# Patient Record
Sex: MALE | Race: WHITE | NOT HISPANIC OR LATINO | Employment: OTHER | URBAN - METROPOLITAN AREA
[De-identification: names, ages, dates, MRNs, and addresses within clinical notes are randomized per-mention and may not be internally consistent; named-entity substitution may affect disease eponyms.]

---

## 2017-03-23 ENCOUNTER — CONVERSION ENCOUNTER (OUTPATIENT)
Dept: CARDIOLOGY | Facility: CLINIC | Age: 82
End: 2017-03-23

## 2017-03-24 ENCOUNTER — CONVERSION ENCOUNTER (OUTPATIENT)
Dept: CARDIOLOGY | Facility: CLINIC | Age: 82
End: 2017-03-24

## 2017-03-27 ENCOUNTER — HOSPITAL ENCOUNTER (OUTPATIENT)
Dept: CARDIOLOGY | Facility: HOSPITAL | Age: 82
Discharge: HOME | End: 2017-03-27
Attending: INTERNAL MEDICINE | Admitting: INTERNAL MEDICINE

## 2017-06-15 ENCOUNTER — HOSPITAL ENCOUNTER (OUTPATIENT)
Dept: ONCOLOGY | Facility: CLINIC | Age: 82
Discharge: HOME | End: 2017-06-15
Attending: INTERNAL MEDICINE | Admitting: INTERNAL MEDICINE

## 2017-06-15 LAB
ALBUMIN SERPL-MCNC: 3.9 G/DL (ref 3.5–4.8)
ALBUMIN/GLOB SERPL: 1.3 {RATIO} (ref 1–1.7)
ALP SERPL-CCNC: 75 IU/L (ref 32–91)
ALT SERPL-CCNC: 18 IU/L (ref 17–63)
ANION GAP SERPL CALC-SCNC: 14 MMOL/L (ref 10–20)
AST SERPL-CCNC: 20 IU/L (ref 15–41)
BILIRUB SERPL-MCNC: 0.7 MG/DL (ref 0.3–1.2)
BUN SERPL-MCNC: 13 MG/DL (ref 8–20)
BUN/CREAT SERPL: 14.4 (ref 6.2–20.3)
CALCIUM SERPL-MCNC: 9.3 MG/DL (ref 8.9–10.3)
CHLORIDE SERPL-SCNC: 102 MMOL/L (ref 101–111)
CONV CO2: 28 MMOL/L (ref 22–32)
CONV TOTAL PROTEIN: 6.9 G/DL (ref 6.1–7.9)
CREAT UR-MCNC: 0.9 MG/DL (ref 0.7–1.2)
GLOBULIN UR ELPH-MCNC: 3 G/DL (ref 2.5–3.8)
GLUCOSE SERPL-MCNC: 164 MG/DL (ref 65–99)
POTASSIUM SERPL-SCNC: 4 MMOL/L (ref 3.6–5.1)
SODIUM SERPL-SCNC: 140 MMOL/L (ref 136–144)

## 2018-04-11 ENCOUNTER — CONVERSION ENCOUNTER (OUTPATIENT)
Dept: CARDIOLOGY | Facility: CLINIC | Age: 83
End: 2018-04-11

## 2018-06-14 ENCOUNTER — HOSPITAL ENCOUNTER (OUTPATIENT)
Dept: ONCOLOGY | Facility: HOSPITAL | Age: 83
Discharge: HOME | End: 2018-06-14
Attending: INTERNAL MEDICINE | Admitting: INTERNAL MEDICINE

## 2018-06-14 ENCOUNTER — CLINICAL SUPPORT (OUTPATIENT)
Dept: ONCOLOGY | Facility: HOSPITAL | Age: 83
End: 2018-06-14

## 2018-06-14 ENCOUNTER — HOSPITAL ENCOUNTER (OUTPATIENT)
Dept: ONCOLOGY | Facility: CLINIC | Age: 83
Setting detail: INFUSION SERIES
Discharge: HOME | End: 2018-06-14
Attending: INTERNAL MEDICINE | Admitting: INTERNAL MEDICINE

## 2018-06-14 LAB
ALBUMIN SERPL-MCNC: 3.6 G/DL (ref 3.5–4.8)
ALBUMIN/GLOB SERPL: 0.9 {RATIO} (ref 1–1.7)
ALP SERPL-CCNC: 66 IU/L (ref 32–91)
ALT SERPL-CCNC: 16 IU/L (ref 17–63)
ANION GAP SERPL CALC-SCNC: 12.2 MMOL/L (ref 10–20)
AST SERPL-CCNC: 18 IU/L (ref 15–41)
BILIRUB SERPL-MCNC: 0.5 MG/DL (ref 0.3–1.2)
BUN SERPL-MCNC: 13 MG/DL (ref 8–20)
BUN/CREAT SERPL: 14.4 (ref 6.2–20.3)
CALCIUM SERPL-MCNC: 9.6 MG/DL (ref 8.9–10.3)
CHLORIDE SERPL-SCNC: 102 MMOL/L (ref 101–111)
CONV CO2: 27 MMOL/L (ref 22–32)
CONV TOTAL PROTEIN: 7.4 G/DL (ref 6.1–7.9)
CREAT UR-MCNC: 0.9 MG/DL (ref 0.7–1.2)
GLOBULIN UR ELPH-MCNC: 3.8 G/DL (ref 2.5–3.8)
GLUCOSE SERPL-MCNC: 105 MG/DL (ref 65–99)
POTASSIUM SERPL-SCNC: 4.2 MMOL/L (ref 3.6–5.1)
SODIUM SERPL-SCNC: 137 MMOL/L (ref 136–144)

## 2018-06-14 NOTE — PROGRESS NOTES
PATIENTS ONCOLOGY RECORD LOCATED IN [facility] [[email or web address]]      Subjective     Name:  MORGAN MIRANDA     Date:  2018  Address:   [place] [address]  Home: [phone]  :  1930 AGE:  88 y.o.        RECORDS OBTAINED:  Patients Oncology Record is located in [facility]

## 2018-06-15 LAB
ANA SER QL IA: NORMAL

## 2019-06-04 VITALS
OXYGEN SATURATION: 97 % | BODY MASS INDEX: 28.1 KG/M2 | WEIGHT: 202 LBS | OXYGEN SATURATION: 95 % | DIASTOLIC BLOOD PRESSURE: 78 MMHG | SYSTOLIC BLOOD PRESSURE: 146 MMHG | DIASTOLIC BLOOD PRESSURE: 72 MMHG | SYSTOLIC BLOOD PRESSURE: 136 MMHG | WEIGHT: 201.5 LBS | HEART RATE: 61 BPM | DIASTOLIC BLOOD PRESSURE: 78 MMHG | BODY MASS INDEX: 28.28 KG/M2 | WEIGHT: 204.75 LBS | HEIGHT: 71 IN | SYSTOLIC BLOOD PRESSURE: 146 MMHG | BODY MASS INDEX: 28.56 KG/M2 | HEART RATE: 64 BPM

## 2019-06-13 ENCOUNTER — HOSPITAL ENCOUNTER (OUTPATIENT)
Dept: ONCOLOGY | Facility: CLINIC | Age: 84
Setting detail: INFUSION SERIES
Discharge: HOME | End: 2019-06-13
Attending: INTERNAL MEDICINE | Admitting: INTERNAL MEDICINE

## 2019-06-13 ENCOUNTER — HOSPITAL ENCOUNTER (OUTPATIENT)
Dept: ONCOLOGY | Facility: HOSPITAL | Age: 84
Discharge: HOME | End: 2019-06-13
Attending: INTERNAL MEDICINE | Admitting: INTERNAL MEDICINE

## 2019-06-13 LAB
ALBUMIN SERPL-MCNC: 3.7 G/DL (ref 3.5–4.8)
ALBUMIN/GLOB SERPL: 1.2 {RATIO} (ref 1–1.7)
ALP SERPL-CCNC: 67 IU/L (ref 32–91)
ALT SERPL-CCNC: 17 IU/L (ref 17–63)
ANION GAP SERPL CALC-SCNC: 15 MMOL/L (ref 10–20)
AST SERPL-CCNC: 23 IU/L (ref 15–41)
BILIRUB SERPL-MCNC: 0.6 MG/DL (ref 0.3–1.2)
BUN SERPL-MCNC: 15 MG/DL (ref 8–20)
BUN/CREAT SERPL: 13.6 (ref 6.2–20.3)
CALCIUM SERPL-MCNC: 9.2 MG/DL (ref 8.9–10.3)
CHLORIDE SERPL-SCNC: 99 MMOL/L (ref 101–111)
CONV CO2: 27 MMOL/L (ref 22–32)
CONV TOTAL PROTEIN: 6.9 G/DL (ref 6.1–7.9)
CREAT UR-MCNC: 1.1 MG/DL (ref 0.7–1.2)
GLOBULIN UR ELPH-MCNC: 3.2 G/DL (ref 2.5–3.8)
GLUCOSE SERPL-MCNC: 138 MG/DL (ref 65–99)
POTASSIUM SERPL-SCNC: 4 MMOL/L (ref 3.6–5.1)
SODIUM SERPL-SCNC: 137 MMOL/L (ref 136–144)

## 2019-06-19 DIAGNOSIS — R76.0 POSITIVE H. PYLORI TITER: Primary | ICD-10-CM

## 2019-06-19 RX ORDER — AMOXICILLIN 500 MG/1
1000 CAPSULE ORAL 2 TIMES DAILY
Qty: 56 CAPSULE | Refills: 0 | Status: SHIPPED | OUTPATIENT
Start: 2019-06-19 | End: 2019-07-03

## 2019-06-19 RX ORDER — OMEPRAZOLE 20 MG/1
20 CAPSULE, DELAYED RELEASE ORAL 2 TIMES DAILY
Qty: 28 CAPSULE | Refills: 0 | Status: SHIPPED | OUTPATIENT
Start: 2019-06-19 | End: 2019-07-03

## 2019-06-19 RX ORDER — CLARITHROMYCIN 500 MG/1
500 TABLET, COATED ORAL 2 TIMES DAILY
Qty: 28 TABLET | Refills: 0 | Status: SHIPPED | OUTPATIENT
Start: 2019-06-19 | End: 2019-07-03

## 2019-06-20 ENCOUNTER — TELEPHONE (OUTPATIENT)
Dept: ONCOLOGY | Facility: CLINIC | Age: 84
End: 2019-06-20

## 2019-06-20 ENCOUNTER — DOCUMENTATION (OUTPATIENT)
Dept: ONCOLOGY | Facility: CLINIC | Age: 84
End: 2019-06-20

## 2019-06-20 NOTE — TELEPHONE ENCOUNTER
Let pt know his test results for H. Pylori was positive and that the NP sent in 3 new medications.  Pt showed v/u.

## 2019-06-29 DIAGNOSIS — R76.0 POSITIVE H. PYLORI TITER: ICD-10-CM

## 2019-07-01 RX ORDER — OMEPRAZOLE 20 MG/1
CAPSULE, DELAYED RELEASE ORAL
Qty: 28 CAPSULE | Refills: 0 | OUTPATIENT
Start: 2019-07-01

## 2024-11-16 ENCOUNTER — APPOINTMENT (OUTPATIENT)
Dept: GENERAL RADIOLOGY | Facility: HOSPITAL | Age: 89
End: 2024-11-16
Payer: MEDICARE

## 2024-11-16 ENCOUNTER — HOSPITAL ENCOUNTER (INPATIENT)
Facility: HOSPITAL | Age: 89
LOS: 6 days | Discharge: NURSING, REHAB OR LONG-TERM CARE | End: 2024-11-22
Attending: EMERGENCY MEDICINE | Admitting: FAMILY MEDICINE
Payer: MEDICARE

## 2024-11-16 DIAGNOSIS — I48.91 ATRIAL FIBRILLATION, UNSPECIFIED TYPE: ICD-10-CM

## 2024-11-16 DIAGNOSIS — I50.9 ACUTE CONGESTIVE HEART FAILURE, UNSPECIFIED HEART FAILURE TYPE: Primary | ICD-10-CM

## 2024-11-16 DIAGNOSIS — R79.89 ELEVATED TROPONIN: ICD-10-CM

## 2024-11-16 LAB
ALBUMIN SERPL-MCNC: 3.3 G/DL (ref 3.5–5.2)
ALBUMIN/GLOB SERPL: 0.9 G/DL
ALP SERPL-CCNC: 116 U/L (ref 39–117)
ALT SERPL W P-5'-P-CCNC: 21 U/L (ref 1–41)
ANION GAP SERPL CALCULATED.3IONS-SCNC: 10.5 MMOL/L (ref 5–15)
ANION GAP SERPL CALCULATED.3IONS-SCNC: 6.6 MMOL/L (ref 5–15)
AST SERPL-CCNC: 18 U/L (ref 1–40)
B PARAPERT DNA SPEC QL NAA+PROBE: NOT DETECTED
B PERT DNA SPEC QL NAA+PROBE: NOT DETECTED
BACTERIA UR QL AUTO: ABNORMAL /HPF
BASOPHILS # BLD AUTO: 0.02 10*3/MM3 (ref 0–0.2)
BASOPHILS # BLD AUTO: 0.03 10*3/MM3 (ref 0–0.2)
BASOPHILS NFR BLD AUTO: 0.2 % (ref 0–1.5)
BASOPHILS NFR BLD AUTO: 0.3 % (ref 0–1.5)
BILIRUB SERPL-MCNC: 0.2 MG/DL (ref 0–1.2)
BILIRUB UR QL STRIP: NEGATIVE
BUN SERPL-MCNC: 27 MG/DL (ref 8–23)
BUN SERPL-MCNC: 29 MG/DL (ref 8–23)
BUN/CREAT SERPL: 30.9 (ref 7–25)
BUN/CREAT SERPL: 33.3 (ref 7–25)
C PNEUM DNA NPH QL NAA+NON-PROBE: NOT DETECTED
CALCIUM SPEC-SCNC: 8.9 MG/DL (ref 8.2–9.6)
CALCIUM SPEC-SCNC: 9 MG/DL (ref 8.2–9.6)
CHLORIDE SERPL-SCNC: 101 MMOL/L (ref 98–107)
CHLORIDE SERPL-SCNC: 102 MMOL/L (ref 98–107)
CLARITY UR: CLEAR
CO2 SERPL-SCNC: 25.5 MMOL/L (ref 22–29)
CO2 SERPL-SCNC: 28.4 MMOL/L (ref 22–29)
COLOR UR: YELLOW
CREAT SERPL-MCNC: 0.81 MG/DL (ref 0.76–1.27)
CREAT SERPL-MCNC: 0.94 MG/DL (ref 0.76–1.27)
D DIMER PPP FEU-MCNC: 2.86 MCGFEU/ML (ref 0–0.94)
DEPRECATED RDW RBC AUTO: 53 FL (ref 37–54)
DEPRECATED RDW RBC AUTO: 54.9 FL (ref 37–54)
EGFRCR SERPLBLD CKD-EPI 2021: 75.1 ML/MIN/1.73
EGFRCR SERPLBLD CKD-EPI 2021: 81.7 ML/MIN/1.73
EOSINOPHIL # BLD AUTO: 0.08 10*3/MM3 (ref 0–0.4)
EOSINOPHIL # BLD AUTO: 0.09 10*3/MM3 (ref 0–0.4)
EOSINOPHIL NFR BLD AUTO: 0.7 % (ref 0.3–6.2)
EOSINOPHIL NFR BLD AUTO: 0.9 % (ref 0.3–6.2)
ERYTHROCYTE [DISTWIDTH] IN BLOOD BY AUTOMATED COUNT: 14.6 % (ref 12.3–15.4)
ERYTHROCYTE [DISTWIDTH] IN BLOOD BY AUTOMATED COUNT: 14.6 % (ref 12.3–15.4)
FLUAV SUBTYP SPEC NAA+PROBE: NOT DETECTED
FLUAV SUBTYP SPEC NAA+PROBE: NOT DETECTED
FLUBV RNA ISLT QL NAA+PROBE: NOT DETECTED
FLUBV RNA ISLT QL NAA+PROBE: NOT DETECTED
GEN 5 2HR TROPONIN T REFLEX: 54 NG/L
GLOBULIN UR ELPH-MCNC: 3.7 GM/DL
GLUCOSE SERPL-MCNC: 108 MG/DL (ref 65–99)
GLUCOSE SERPL-MCNC: 127 MG/DL (ref 65–99)
GLUCOSE UR STRIP-MCNC: NEGATIVE MG/DL
HADV DNA SPEC NAA+PROBE: NOT DETECTED
HCOV 229E RNA SPEC QL NAA+PROBE: NOT DETECTED
HCOV HKU1 RNA SPEC QL NAA+PROBE: NOT DETECTED
HCOV NL63 RNA SPEC QL NAA+PROBE: NOT DETECTED
HCOV OC43 RNA SPEC QL NAA+PROBE: NOT DETECTED
HCT VFR BLD AUTO: 37.8 % (ref 37.5–51)
HCT VFR BLD AUTO: 37.8 % (ref 37.5–51)
HGB BLD-MCNC: 11.6 G/DL (ref 13–17.7)
HGB BLD-MCNC: 11.8 G/DL (ref 13–17.7)
HGB UR QL STRIP.AUTO: ABNORMAL
HMPV RNA NPH QL NAA+NON-PROBE: NOT DETECTED
HPIV1 RNA ISLT QL NAA+PROBE: NOT DETECTED
HPIV2 RNA SPEC QL NAA+PROBE: NOT DETECTED
HPIV3 RNA NPH QL NAA+PROBE: NOT DETECTED
HPIV4 P GENE NPH QL NAA+PROBE: DETECTED
HYALINE CASTS UR QL AUTO: ABNORMAL /LPF
IMM GRANULOCYTES # BLD AUTO: 0.03 10*3/MM3 (ref 0–0.05)
IMM GRANULOCYTES # BLD AUTO: 0.03 10*3/MM3 (ref 0–0.05)
IMM GRANULOCYTES NFR BLD AUTO: 0.3 % (ref 0–0.5)
IMM GRANULOCYTES NFR BLD AUTO: 0.3 % (ref 0–0.5)
IRON 24H UR-MRATE: 23 MCG/DL (ref 59–158)
IRON SATN MFR SERPL: 10 % (ref 20–50)
KETONES UR QL STRIP: NEGATIVE
LEUKOCYTE ESTERASE UR QL STRIP.AUTO: ABNORMAL
LYMPHOCYTES # BLD AUTO: 1.92 10*3/MM3 (ref 0.7–3.1)
LYMPHOCYTES # BLD AUTO: 2.03 10*3/MM3 (ref 0.7–3.1)
LYMPHOCYTES NFR BLD AUTO: 17.8 % (ref 19.6–45.3)
LYMPHOCYTES NFR BLD AUTO: 19.4 % (ref 19.6–45.3)
M PNEUMO IGG SER IA-ACNC: NOT DETECTED
MAGNESIUM SERPL-MCNC: 1.9 MG/DL (ref 1.7–2.3)
MCH RBC QN AUTO: 30.4 PG (ref 26.6–33)
MCH RBC QN AUTO: 31.4 PG (ref 26.6–33)
MCHC RBC AUTO-ENTMCNC: 30.7 G/DL (ref 31.5–35.7)
MCHC RBC AUTO-ENTMCNC: 31.2 G/DL (ref 31.5–35.7)
MCV RBC AUTO: 100.5 FL (ref 79–97)
MCV RBC AUTO: 99 FL (ref 79–97)
MONOCYTES # BLD AUTO: 1.46 10*3/MM3 (ref 0.1–0.9)
MONOCYTES # BLD AUTO: 1.63 10*3/MM3 (ref 0.1–0.9)
MONOCYTES NFR BLD AUTO: 14 % (ref 5–12)
MONOCYTES NFR BLD AUTO: 15.1 % (ref 5–12)
MRSA DNA SPEC QL NAA+PROBE: ABNORMAL
NEUTROPHILS NFR BLD AUTO: 6.81 10*3/MM3 (ref 1.7–7)
NEUTROPHILS NFR BLD AUTO: 65.2 % (ref 42.7–76)
NEUTROPHILS NFR BLD AUTO: 65.8 % (ref 42.7–76)
NEUTROPHILS NFR BLD AUTO: 7.11 10*3/MM3 (ref 1.7–7)
NITRITE UR QL STRIP: POSITIVE
NRBC BLD AUTO-RTO: 0 /100 WBC (ref 0–0.2)
NT-PROBNP SERPL-MCNC: 2608 PG/ML (ref 0–1800)
PH UR STRIP.AUTO: 5.5 [PH] (ref 5–8)
PLATELET # BLD AUTO: 177 10*3/MM3 (ref 140–450)
PLATELET # BLD AUTO: 195 10*3/MM3 (ref 140–450)
PMV BLD AUTO: 10.2 FL (ref 6–12)
PMV BLD AUTO: 9.5 FL (ref 6–12)
POTASSIUM SERPL-SCNC: 4.4 MMOL/L (ref 3.5–5.2)
POTASSIUM SERPL-SCNC: 4.4 MMOL/L (ref 3.5–5.2)
PROT SERPL-MCNC: 7 G/DL (ref 6–8.5)
PROT UR QL STRIP: ABNORMAL
QT INTERVAL: 347 MS
QT INTERVAL: 359 MS
QT INTERVAL: 380 MS
QTC INTERVAL: 442 MS
QTC INTERVAL: 455 MS
QTC INTERVAL: 456 MS
RBC # BLD AUTO: 3.76 10*6/MM3 (ref 4.14–5.8)
RBC # BLD AUTO: 3.82 10*6/MM3 (ref 4.14–5.8)
RBC # UR STRIP: ABNORMAL /HPF
REF LAB TEST METHOD: ABNORMAL
RHINOVIRUS RNA SPEC NAA+PROBE: NOT DETECTED
RSV RNA NPH QL NAA+NON-PROBE: NOT DETECTED
SARS-COV-2 RNA NPH QL NAA+NON-PROBE: NOT DETECTED
SARS-COV-2 RNA RESP QL NAA+PROBE: NOT DETECTED
SODIUM SERPL-SCNC: 136 MMOL/L (ref 136–145)
SODIUM SERPL-SCNC: 138 MMOL/L (ref 136–145)
SP GR UR STRIP: 1.02 (ref 1–1.03)
SQUAMOUS #/AREA URNS HPF: ABNORMAL /HPF
TIBC SERPL-MCNC: 240 MCG/DL (ref 298–536)
TRANSFERRIN SERPL-MCNC: 161 MG/DL (ref 200–360)
TROPONIN T DELTA: -2 NG/L
TROPONIN T SERPL HS-MCNC: 56 NG/L
TSH SERPL DL<=0.05 MIU/L-ACNC: 0.99 UIU/ML (ref 0.27–4.2)
UROBILINOGEN UR QL STRIP: ABNORMAL
WBC # UR STRIP: ABNORMAL /HPF
WBC NRBC COR # BLD AUTO: 10.44 10*3/MM3 (ref 3.4–10.8)
WBC NRBC COR # BLD AUTO: 10.8 10*3/MM3 (ref 3.4–10.8)

## 2024-11-16 PROCEDURE — 93010 ELECTROCARDIOGRAM REPORT: CPT | Performed by: EMERGENCY MEDICINE

## 2024-11-16 PROCEDURE — 83735 ASSAY OF MAGNESIUM: CPT | Performed by: FAMILY MEDICINE

## 2024-11-16 PROCEDURE — 87636 SARSCOV2 & INF A&B AMP PRB: CPT | Performed by: EMERGENCY MEDICINE

## 2024-11-16 PROCEDURE — 71045 X-RAY EXAM CHEST 1 VIEW: CPT

## 2024-11-16 PROCEDURE — 99285 EMERGENCY DEPT VISIT HI MDM: CPT

## 2024-11-16 PROCEDURE — 0202U NFCT DS 22 TRGT SARS-COV-2: CPT | Performed by: FAMILY MEDICINE

## 2024-11-16 PROCEDURE — 36415 COLL VENOUS BLD VENIPUNCTURE: CPT

## 2024-11-16 PROCEDURE — 84443 ASSAY THYROID STIM HORMONE: CPT | Performed by: FAMILY MEDICINE

## 2024-11-16 PROCEDURE — 25010000002 ENOXAPARIN PER 10 MG: Performed by: FAMILY MEDICINE

## 2024-11-16 PROCEDURE — 93010 ELECTROCARDIOGRAM REPORT: CPT | Performed by: INTERNAL MEDICINE

## 2024-11-16 PROCEDURE — 85025 COMPLETE CBC W/AUTO DIFF WBC: CPT | Performed by: EMERGENCY MEDICINE

## 2024-11-16 PROCEDURE — 84484 ASSAY OF TROPONIN QUANT: CPT | Performed by: EMERGENCY MEDICINE

## 2024-11-16 PROCEDURE — 83540 ASSAY OF IRON: CPT | Performed by: FAMILY MEDICINE

## 2024-11-16 PROCEDURE — 25010000002 FUROSEMIDE PER 20 MG: Performed by: FAMILY MEDICINE

## 2024-11-16 PROCEDURE — 85025 COMPLETE CBC W/AUTO DIFF WBC: CPT | Performed by: FAMILY MEDICINE

## 2024-11-16 PROCEDURE — 93005 ELECTROCARDIOGRAM TRACING: CPT | Performed by: EMERGENCY MEDICINE

## 2024-11-16 PROCEDURE — 84466 ASSAY OF TRANSFERRIN: CPT | Performed by: FAMILY MEDICINE

## 2024-11-16 PROCEDURE — 87641 MR-STAPH DNA AMP PROBE: CPT | Performed by: FAMILY MEDICINE

## 2024-11-16 PROCEDURE — 85379 FIBRIN DEGRADATION QUANT: CPT | Performed by: INTERNAL MEDICINE

## 2024-11-16 PROCEDURE — 81001 URINALYSIS AUTO W/SCOPE: CPT | Performed by: FAMILY MEDICINE

## 2024-11-16 PROCEDURE — 80053 COMPREHEN METABOLIC PANEL: CPT | Performed by: EMERGENCY MEDICINE

## 2024-11-16 PROCEDURE — 83880 ASSAY OF NATRIURETIC PEPTIDE: CPT | Performed by: EMERGENCY MEDICINE

## 2024-11-16 PROCEDURE — 93005 ELECTROCARDIOGRAM TRACING: CPT | Performed by: FAMILY MEDICINE

## 2024-11-16 PROCEDURE — 99285 EMERGENCY DEPT VISIT HI MDM: CPT | Performed by: EMERGENCY MEDICINE

## 2024-11-16 RX ORDER — PANTOPRAZOLE SODIUM 40 MG/10ML
40 INJECTION, POWDER, LYOPHILIZED, FOR SOLUTION INTRAVENOUS
Status: DISCONTINUED | OUTPATIENT
Start: 2024-11-17 | End: 2024-11-17

## 2024-11-16 RX ORDER — BISACODYL 5 MG/1
5 TABLET, DELAYED RELEASE ORAL DAILY PRN
Status: DISCONTINUED | OUTPATIENT
Start: 2024-11-16 | End: 2024-11-22 | Stop reason: HOSPADM

## 2024-11-16 RX ORDER — BISACODYL 10 MG
10 SUPPOSITORY, RECTAL RECTAL DAILY PRN
Status: DISCONTINUED | OUTPATIENT
Start: 2024-11-16 | End: 2024-11-22 | Stop reason: HOSPADM

## 2024-11-16 RX ORDER — FUROSEMIDE 10 MG/ML
20 INJECTION INTRAMUSCULAR; INTRAVENOUS ONCE
Status: COMPLETED | OUTPATIENT
Start: 2024-11-16 | End: 2024-11-16

## 2024-11-16 RX ORDER — AMLODIPINE BESYLATE 5 MG/1
5 TABLET ORAL DAILY
COMMUNITY

## 2024-11-16 RX ORDER — SODIUM CHLORIDE 0.9 % (FLUSH) 0.9 %
10 SYRINGE (ML) INJECTION AS NEEDED
Status: DISCONTINUED | OUTPATIENT
Start: 2024-11-16 | End: 2024-11-22 | Stop reason: HOSPADM

## 2024-11-16 RX ORDER — ACETAMINOPHEN 325 MG/1
650 TABLET ORAL EVERY 4 HOURS PRN
COMMUNITY

## 2024-11-16 RX ORDER — CLONIDINE HYDROCHLORIDE 0.1 MG/1
0.1 TABLET ORAL 2 TIMES DAILY
COMMUNITY

## 2024-11-16 RX ORDER — LANOLIN ALCOHOL/MO/W.PET/CERES
25 CREAM (GRAM) TOPICAL DAILY
COMMUNITY

## 2024-11-16 RX ORDER — AMOXICILLIN 250 MG
2 CAPSULE ORAL 2 TIMES DAILY PRN
Status: DISCONTINUED | OUTPATIENT
Start: 2024-11-16 | End: 2024-11-22 | Stop reason: HOSPADM

## 2024-11-16 RX ORDER — ACETAMINOPHEN 325 MG/1
650 TABLET ORAL EVERY 4 HOURS PRN
Status: DISCONTINUED | OUTPATIENT
Start: 2024-11-16 | End: 2024-11-22 | Stop reason: HOSPADM

## 2024-11-16 RX ORDER — ACETAMINOPHEN 650 MG/1
650 SUPPOSITORY RECTAL EVERY 4 HOURS PRN
Status: DISCONTINUED | OUTPATIENT
Start: 2024-11-16 | End: 2024-11-22 | Stop reason: HOSPADM

## 2024-11-16 RX ORDER — ONDANSETRON 2 MG/ML
4 INJECTION INTRAMUSCULAR; INTRAVENOUS EVERY 6 HOURS PRN
Status: DISCONTINUED | OUTPATIENT
Start: 2024-11-16 | End: 2024-11-22 | Stop reason: HOSPADM

## 2024-11-16 RX ORDER — ENOXAPARIN SODIUM 100 MG/ML
40 INJECTION SUBCUTANEOUS
Status: DISCONTINUED | OUTPATIENT
Start: 2024-11-16 | End: 2024-11-22 | Stop reason: HOSPADM

## 2024-11-16 RX ORDER — ONDANSETRON 4 MG/1
4 TABLET, ORALLY DISINTEGRATING ORAL EVERY 6 HOURS PRN
Status: DISCONTINUED | OUTPATIENT
Start: 2024-11-16 | End: 2024-11-22 | Stop reason: HOSPADM

## 2024-11-16 RX ORDER — POLYETHYLENE GLYCOL 3350 17 G/17G
17 POWDER, FOR SOLUTION ORAL DAILY PRN
Status: DISCONTINUED | OUTPATIENT
Start: 2024-11-16 | End: 2024-11-22 | Stop reason: HOSPADM

## 2024-11-16 RX ORDER — SODIUM CHLORIDE 0.9 % (FLUSH) 0.9 %
10 SYRINGE (ML) INJECTION EVERY 12 HOURS SCHEDULED
Status: DISCONTINUED | OUTPATIENT
Start: 2024-11-16 | End: 2024-11-22 | Stop reason: HOSPADM

## 2024-11-16 RX ORDER — HYDRALAZINE HYDROCHLORIDE 20 MG/ML
10 INJECTION INTRAMUSCULAR; INTRAVENOUS EVERY 6 HOURS PRN
Status: DISCONTINUED | OUTPATIENT
Start: 2024-11-16 | End: 2024-11-22 | Stop reason: HOSPADM

## 2024-11-16 RX ORDER — SODIUM CHLORIDE 9 MG/ML
40 INJECTION, SOLUTION INTRAVENOUS AS NEEDED
Status: DISCONTINUED | OUTPATIENT
Start: 2024-11-16 | End: 2024-11-22 | Stop reason: HOSPADM

## 2024-11-16 RX ORDER — LISINOPRIL AND HYDROCHLOROTHIAZIDE 12.5; 2 MG/1; MG/1
1 TABLET ORAL DAILY
COMMUNITY
End: 2024-11-22 | Stop reason: HOSPADM

## 2024-11-16 RX ORDER — PRIMIDONE 250 MG/1
250 TABLET ORAL 3 TIMES DAILY
COMMUNITY

## 2024-11-16 RX ORDER — ACETAMINOPHEN 160 MG/5ML
650 SOLUTION ORAL EVERY 4 HOURS PRN
Status: DISCONTINUED | OUTPATIENT
Start: 2024-11-16 | End: 2024-11-22 | Stop reason: HOSPADM

## 2024-11-16 RX ORDER — METOPROLOL SUCCINATE 25 MG/1
12.5 TABLET, EXTENDED RELEASE ORAL
Status: DISCONTINUED | OUTPATIENT
Start: 2024-11-16 | End: 2024-11-22 | Stop reason: HOSPADM

## 2024-11-16 RX ADMIN — Medication 10 ML: at 22:01

## 2024-11-16 RX ADMIN — FUROSEMIDE 20 MG: 10 INJECTION, SOLUTION INTRAMUSCULAR; INTRAVENOUS at 19:22

## 2024-11-16 RX ADMIN — METOPROLOL SUCCINATE 12.5 MG: 25 TABLET, EXTENDED RELEASE ORAL at 19:23

## 2024-11-16 RX ADMIN — ENOXAPARIN SODIUM 40 MG: 100 INJECTION SUBCUTANEOUS at 19:22

## 2024-11-16 NOTE — FSED PROVIDER NOTE
Subjective   History of Present Illness  89-year-old male sent from urgent care due to concern for new onset CHF.  He has some cough swelling of the legs.  He denies any fevers or chills denies body aches just says he has mild shortness of breath and the cough that is wet sounding.  Does admit to some increased swelling the legs, denies any history of coronary artery disease denies congestive heart failure.  Never lays down in bed because he is generally too weak to get out of bed so he is in a recliner that can help stand him up.  Review of Systems  As noted in HPI  History reviewed. No pertinent past medical history.    No Known Allergies    History reviewed. No pertinent surgical history.    History reviewed. No pertinent family history.    Social History     Socioeconomic History    Marital status:    Tobacco Use    Smoking status: Former     Types: Cigarettes   Vaping Use    Vaping status: Never Used   Substance and Sexual Activity    Alcohol use: Not Currently    Drug use: Never    Sexual activity: Defer           Objective   Physical Exam  Nursing note reviewed.  INITIAL VITAL SIGNS: Reviewed by me.  Pulse ox normal  GENERAL: Alert and interactive. No acute distress.  HEAD: Head is normocephalic.  EYES: EOMI. PERRL. No scleral icterus. No conjunctival injection.  ENT: Moist mucous membranes.   NECK: Supple. Full range of motion.  RESPIRATORY: No tachypnea.  Bilateral inspiratory crackles  CV: Irregularly irregular rhythm  ABDOMEN: Soft, nondistended, nontender. No guarding. No rebound. No masses.   BACK:  No obvious deformity.  EXTREMITIES: No deformity. No clubbing or cyanosis.  2+ pitting edema two thirds the way up the shin  SKIN: Warm and dry. No diaphoresis. No obvious rashes.   NEUROLOGIC: Alert and oriented. Face is symmetric. Speech is normal. Moves all extremities equally. Motor and sensory distally intact.       Procedures     EKG         EKG time/Interp time: 1159/1201  Rhythm/Rate:  First-degree AV block with premature atrial complexes  P waves and MD:   QRS, axis: Normal QRS duration with a slight left axis deviation  ST and T waves: No ST elevations or depressions concerning for acute ischemia  Independently interpreted by me contemporaneously with treatment        ED Course  ED Course as of 11/16/24 1353   Sat Nov 16, 2024   1332 Patient with H&P as above, presentation consistent with new onset CHF versus bilateral pneumonia.  On exam he does have inspiratory crackles, heart sounds are irregularly irregular, has bilateral lower extremity edema.  EKG is without acute ischemia, read as sinus rhythm with PACs but I have suspicion for atrial fibrillation due to variability of heart rate on the monitor.  Labs and x-ray confirmed new onset CHF.  BNP is 2600 high-sensitivity troponin is 56.  X-ray is with edema.  Discussed with nurse practitioner for [employer], stepping patient to Dr. Alejandro's service [RO]      ED Course User Index  [RO] Sathya Weinstein MD                                           Medical Decision Making  Problems Addressed:  Acute congestive heart failure, unspecified heart failure type: complicated acute illness or injury  Elevated troponin: complicated acute illness or injury    Amount and/or Complexity of Data Reviewed  Labs: ordered. Decision-making details documented in ED Course.  Radiology: ordered. Decision-making details documented in ED Course.  ECG/medicine tests: ordered and independent interpretation performed. Decision-making details documented in ED Course.    Risk  Prescription drug management.  Decision regarding hospitalization.        Final diagnoses:   Acute congestive heart failure, unspecified heart failure type   Elevated troponin   Atrial fibrillation, unspecified type       ED Disposition  ED Disposition       ED Disposition   Decision to Admit    Condition   --    Comment   Level of Care: Telemetry [5]   Admitting Physician: OMID ALEJANDRO [[id number]]   Patient  Class: Inpatient [101]                 No follow-up provider specified.       Medication List      No changes were made to your prescriptions during this visit.

## 2024-11-16 NOTE — LETTER
EMS Transport Request  For use at [facility], [place], [place], [place], and [place] only   Patient Name: Sathya Flores : 1930   Weight:84 kg (185 lb 3 oz) Pick-up Location: 2210 BLS/ALS: BLS/ALS: BLS   Insurance: [employer] MEDICARE REPLACEMENT Auth End Date: 24   Pre-Cert #: D/C Summary complete:    Destination: Other [place]    Contact Precautions: None   Equipment (O2, Fluids, etc.): O2, settings 2L NC   Arrive By Date/Time: 24 Stretcher/WC: Wheelchair   CM Requesting: Jena Linton RN/Shaheed Ext: [phone]   Notes/Medical Necessity: Pt is on 2L O2, no home O2, family unable to transport      ______________________________________________________________________    *Only 2 patient bags OR 1 carry-on size bag are permitted.  Wheelchairs and walkers CANNOT transported with the patient. Acknowledge: Yes

## 2024-11-17 ENCOUNTER — APPOINTMENT (OUTPATIENT)
Dept: CT IMAGING | Facility: HOSPITAL | Age: 89
End: 2024-11-17
Payer: MEDICARE

## 2024-11-17 ENCOUNTER — APPOINTMENT (OUTPATIENT)
Dept: CARDIOLOGY | Facility: HOSPITAL | Age: 89
End: 2024-11-17
Payer: MEDICARE

## 2024-11-17 LAB
ANION GAP SERPL CALCULATED.3IONS-SCNC: 11 MMOL/L (ref 5–15)
AORTIC DIMENSIONLESS INDEX: 0.68 (DI)
BACTERIA UR QL AUTO: ABNORMAL /HPF
BASOPHILS # BLD AUTO: 0.03 10*3/MM3 (ref 0–0.2)
BASOPHILS NFR BLD AUTO: 0.2 % (ref 0–1.5)
BH CV ECHO MEAS - AO MAX PG: 10.2 MMHG
BH CV ECHO MEAS - AO MEAN PG: 6 MMHG
BH CV ECHO MEAS - AO V2 MAX: 160 CM/SEC
BH CV ECHO MEAS - AO V2 VTI: 25.1 CM
BH CV ECHO MEAS - AVA(I,D): 2.03 CM2
BH CV ECHO MEAS - EDV(CUBED): 97.3 ML
BH CV ECHO MEAS - EDV(MOD-SP2): 70.5 ML
BH CV ECHO MEAS - EDV(MOD-SP4): 67.9 ML
BH CV ECHO MEAS - EF(MOD-BP): 64.3 %
BH CV ECHO MEAS - EF(MOD-SP2): 62.8 %
BH CV ECHO MEAS - EF(MOD-SP4): 64.8 %
BH CV ECHO MEAS - ESV(CUBED): 39.3 ML
BH CV ECHO MEAS - ESV(MOD-SP2): 26.2 ML
BH CV ECHO MEAS - ESV(MOD-SP4): 23.9 ML
BH CV ECHO MEAS - FS: 26.1 %
BH CV ECHO MEAS - IVS/LVPW: 0.77 CM
BH CV ECHO MEAS - IVSD: 1 CM
BH CV ECHO MEAS - LA DIMENSION: 3.6 CM
BH CV ECHO MEAS - LAT PEAK E' VEL: 7.3 CM/SEC
BH CV ECHO MEAS - LV DIASTOLIC VOL/BSA (35-75): 35.9 CM2
BH CV ECHO MEAS - LV MASS(C)D: 192.9 GRAMS
BH CV ECHO MEAS - LV MAX PG: 4.7 MMHG
BH CV ECHO MEAS - LV MEAN PG: 2 MMHG
BH CV ECHO MEAS - LV SYSTOLIC VOL/BSA (12-30): 12.6 CM2
BH CV ECHO MEAS - LV V1 MAX: 108 CM/SEC
BH CV ECHO MEAS - LV V1 VTI: 16.2 CM
BH CV ECHO MEAS - LVIDD: 4.6 CM
BH CV ECHO MEAS - LVIDS: 3.4 CM
BH CV ECHO MEAS - LVOT AREA: 3.1 CM2
BH CV ECHO MEAS - LVOT DIAM: 2 CM
BH CV ECHO MEAS - LVPWD: 1.3 CM
BH CV ECHO MEAS - MED PEAK E' VEL: 2.9 CM/SEC
BH CV ECHO MEAS - MR MAX PG: 66.6 MMHG
BH CV ECHO MEAS - MR MAX VEL: 408 CM/SEC
BH CV ECHO MEAS - MV A DUR: 0.16 SEC
BH CV ECHO MEAS - MV A MAX VEL: 106 CM/SEC
BH CV ECHO MEAS - MV DEC SLOPE: 861 CM/SEC2
BH CV ECHO MEAS - MV DEC TIME: 0.18 SEC
BH CV ECHO MEAS - MV E MAX VEL: 103 CM/SEC
BH CV ECHO MEAS - MV E/A: 0.97
BH CV ECHO MEAS - MV MAX PG: 9.7 MMHG
BH CV ECHO MEAS - MV MEAN PG: 3 MMHG
BH CV ECHO MEAS - MV P1/2T: 55.1 MSEC
BH CV ECHO MEAS - MV V2 VTI: 32.9 CM
BH CV ECHO MEAS - MVA(P1/2T): 4 CM2
BH CV ECHO MEAS - MVA(VTI): 1.55 CM2
BH CV ECHO MEAS - PA V2 MAX: 116 CM/SEC
BH CV ECHO MEAS - PULM A REVS DUR: 0.16 SEC
BH CV ECHO MEAS - PULM A REVS VEL: 49.7 CM/SEC
BH CV ECHO MEAS - PULM DIAS VEL: 33.9 CM/SEC
BH CV ECHO MEAS - PULM S/D: 1.27
BH CV ECHO MEAS - PULM SYS VEL: 43.1 CM/SEC
BH CV ECHO MEAS - RAP SYSTOLE: 15 MMHG
BH CV ECHO MEAS - RV MAX PG: 3.1 MMHG
BH CV ECHO MEAS - RV V1 MAX: 88.6 CM/SEC
BH CV ECHO MEAS - RV V1 VTI: 16 CM
BH CV ECHO MEAS - RVSP: 57.5 MMHG
BH CV ECHO MEAS - SV(LVOT): 50.9 ML
BH CV ECHO MEAS - SV(MOD-SP2): 44.3 ML
BH CV ECHO MEAS - SV(MOD-SP4): 44 ML
BH CV ECHO MEAS - SVI(LVOT): 26.9 ML/M2
BH CV ECHO MEAS - SVI(MOD-SP2): 23.4 ML/M2
BH CV ECHO MEAS - SVI(MOD-SP4): 23.3 ML/M2
BH CV ECHO MEAS - TAPSE (>1.6): 2.06 CM
BH CV ECHO MEAS - TR MAX PG: 42.5 MMHG
BH CV ECHO MEAS - TR MAX VEL: 326 CM/SEC
BH CV ECHO MEASUREMENTS AVERAGE E/E' RATIO: 20.2
BH CV XLRA - TDI S': 13.6 CM/SEC
BILIRUB UR QL STRIP: NEGATIVE
BUN SERPL-MCNC: 23 MG/DL (ref 8–23)
BUN/CREAT SERPL: 26.4 (ref 7–25)
CALCIUM SPEC-SCNC: 9.4 MG/DL (ref 8.2–9.6)
CHLORIDE SERPL-SCNC: 99 MMOL/L (ref 98–107)
CLARITY UR: ABNORMAL
CO2 SERPL-SCNC: 29 MMOL/L (ref 22–29)
COLOR UR: YELLOW
CREAT SERPL-MCNC: 0.87 MG/DL (ref 0.76–1.27)
DEPRECATED RDW RBC AUTO: 51.8 FL (ref 37–54)
EGFRCR SERPLBLD CKD-EPI 2021: 80 ML/MIN/1.73
EOSINOPHIL # BLD AUTO: 0.02 10*3/MM3 (ref 0–0.4)
EOSINOPHIL NFR BLD AUTO: 0.2 % (ref 0.3–6.2)
ERYTHROCYTE [DISTWIDTH] IN BLOOD BY AUTOMATED COUNT: 14.5 % (ref 12.3–15.4)
GLUCOSE SERPL-MCNC: 100 MG/DL (ref 65–99)
GLUCOSE UR STRIP-MCNC: NEGATIVE MG/DL
HCT VFR BLD AUTO: 40.3 % (ref 37.5–51)
HGB BLD-MCNC: 13.2 G/DL (ref 13–17.7)
HGB UR QL STRIP.AUTO: ABNORMAL
HYALINE CASTS UR QL AUTO: ABNORMAL /LPF
IMM GRANULOCYTES # BLD AUTO: 0.04 10*3/MM3 (ref 0–0.05)
IMM GRANULOCYTES NFR BLD AUTO: 0.3 % (ref 0–0.5)
KETONES UR QL STRIP: ABNORMAL
LEFT ATRIUM VOLUME INDEX: 26.1 ML/M2
LEUKOCYTE ESTERASE UR QL STRIP.AUTO: ABNORMAL
LYMPHOCYTES # BLD AUTO: 1.93 10*3/MM3 (ref 0.7–3.1)
LYMPHOCYTES NFR BLD AUTO: 15.4 % (ref 19.6–45.3)
MCH RBC QN AUTO: 31.9 PG (ref 26.6–33)
MCHC RBC AUTO-ENTMCNC: 32.8 G/DL (ref 31.5–35.7)
MCV RBC AUTO: 97.3 FL (ref 79–97)
MONOCYTES # BLD AUTO: 1.84 10*3/MM3 (ref 0.1–0.9)
MONOCYTES NFR BLD AUTO: 14.7 % (ref 5–12)
NEUTROPHILS NFR BLD AUTO: 69.2 % (ref 42.7–76)
NEUTROPHILS NFR BLD AUTO: 8.64 10*3/MM3 (ref 1.7–7)
NITRITE UR QL STRIP: POSITIVE
NRBC BLD AUTO-RTO: 0 /100 WBC (ref 0–0.2)
PH UR STRIP.AUTO: 7.5 [PH] (ref 5–8)
PLATELET # BLD AUTO: 195 10*3/MM3 (ref 140–450)
PMV BLD AUTO: 10.1 FL (ref 6–12)
POTASSIUM SERPL-SCNC: 4.3 MMOL/L (ref 3.5–5.2)
PROT UR QL STRIP: ABNORMAL
RBC # BLD AUTO: 4.14 10*6/MM3 (ref 4.14–5.8)
RBC # UR STRIP: ABNORMAL /HPF
REF LAB TEST METHOD: ABNORMAL
SINUS: 3.5 CM
SODIUM SERPL-SCNC: 139 MMOL/L (ref 136–145)
SP GR UR STRIP: 1.02 (ref 1–1.03)
SQUAMOUS #/AREA URNS HPF: ABNORMAL /HPF
STJ: 2.3 CM
UROBILINOGEN UR QL STRIP: ABNORMAL
WBC # UR STRIP: ABNORMAL /HPF
WBC NRBC COR # BLD AUTO: 12.5 10*3/MM3 (ref 3.4–10.8)
WHOLE BLOOD HOLD COAG: NORMAL

## 2024-11-17 PROCEDURE — 25010000002 METHYLPREDNISOLONE PER 40 MG: Performed by: INTERNAL MEDICINE

## 2024-11-17 PROCEDURE — 71250 CT THORAX DX C-: CPT

## 2024-11-17 PROCEDURE — 25010000002 ENOXAPARIN PER 10 MG: Performed by: FAMILY MEDICINE

## 2024-11-17 PROCEDURE — 93306 TTE W/DOPPLER COMPLETE: CPT

## 2024-11-17 PROCEDURE — 87077 CULTURE AEROBIC IDENTIFY: CPT | Performed by: NURSE PRACTITIONER

## 2024-11-17 PROCEDURE — 85025 COMPLETE CBC W/AUTO DIFF WBC: CPT | Performed by: FAMILY MEDICINE

## 2024-11-17 PROCEDURE — 87186 SC STD MICRODIL/AGAR DIL: CPT | Performed by: NURSE PRACTITIONER

## 2024-11-17 PROCEDURE — 93306 TTE W/DOPPLER COMPLETE: CPT | Performed by: INTERNAL MEDICINE

## 2024-11-17 PROCEDURE — 97162 PT EVAL MOD COMPLEX 30 MIN: CPT

## 2024-11-17 PROCEDURE — 87086 URINE CULTURE/COLONY COUNT: CPT | Performed by: NURSE PRACTITIONER

## 2024-11-17 PROCEDURE — 25010000002 CEFTRIAXONE PER 250 MG: Performed by: NURSE PRACTITIONER

## 2024-11-17 PROCEDURE — 99222 1ST HOSP IP/OBS MODERATE 55: CPT | Performed by: INTERNAL MEDICINE

## 2024-11-17 PROCEDURE — 80048 BASIC METABOLIC PNL TOTAL CA: CPT | Performed by: FAMILY MEDICINE

## 2024-11-17 PROCEDURE — 81001 URINALYSIS AUTO W/SCOPE: CPT | Performed by: NURSE PRACTITIONER

## 2024-11-17 RX ORDER — PRIMIDONE 250 MG/1
250 TABLET ORAL 3 TIMES DAILY
Status: DISCONTINUED | OUTPATIENT
Start: 2024-11-17 | End: 2024-11-22 | Stop reason: HOSPADM

## 2024-11-17 RX ORDER — CLONIDINE HYDROCHLORIDE 0.1 MG/1
0.1 TABLET ORAL 2 TIMES DAILY
Status: DISCONTINUED | OUTPATIENT
Start: 2024-11-17 | End: 2024-11-22 | Stop reason: HOSPADM

## 2024-11-17 RX ORDER — AMLODIPINE BESYLATE 5 MG/1
5 TABLET ORAL DAILY
Status: DISCONTINUED | OUTPATIENT
Start: 2024-11-17 | End: 2024-11-22 | Stop reason: HOSPADM

## 2024-11-17 RX ORDER — METHYLPREDNISOLONE SODIUM SUCCINATE 40 MG/ML
20 INJECTION, POWDER, LYOPHILIZED, FOR SOLUTION INTRAMUSCULAR; INTRAVENOUS DAILY
Status: DISCONTINUED | OUTPATIENT
Start: 2024-11-17 | End: 2024-11-22

## 2024-11-17 RX ADMIN — AMLODIPINE BESYLATE 5 MG: 5 TABLET ORAL at 12:54

## 2024-11-17 RX ADMIN — PRIMIDONE 250 MG: 250 TABLET ORAL at 17:01

## 2024-11-17 RX ADMIN — CEFTRIAXONE 1000 MG: 1 INJECTION, POWDER, FOR SOLUTION INTRAMUSCULAR; INTRAVENOUS at 12:54

## 2024-11-17 RX ADMIN — ACETAMINOPHEN 650 MG: 650 SOLUTION ORAL at 23:02

## 2024-11-17 RX ADMIN — PRIMIDONE 250 MG: 250 TABLET ORAL at 20:14

## 2024-11-17 RX ADMIN — METOPROLOL SUCCINATE 12.5 MG: 25 TABLET, EXTENDED RELEASE ORAL at 10:37

## 2024-11-17 RX ADMIN — METHYLPREDNISOLONE SODIUM SUCCINATE 20 MG: 40 INJECTION, POWDER, FOR SOLUTION INTRAMUSCULAR; INTRAVENOUS at 12:53

## 2024-11-17 RX ADMIN — PANTOPRAZOLE SODIUM 40 MG: 40 INJECTION, POWDER, FOR SOLUTION INTRAVENOUS at 06:12

## 2024-11-17 RX ADMIN — CLONIDINE HYDROCHLORIDE 0.1 MG: 0.1 TABLET ORAL at 12:54

## 2024-11-17 RX ADMIN — CLONIDINE HYDROCHLORIDE 0.1 MG: 0.1 TABLET ORAL at 20:13

## 2024-11-17 RX ADMIN — ENOXAPARIN SODIUM 40 MG: 100 INJECTION SUBCUTANEOUS at 17:01

## 2024-11-17 RX ADMIN — Medication 10 ML: at 10:37

## 2024-11-17 NOTE — THERAPY EVALUATION
Patient Name: Sathya Flores  : 1930    MRN: [id number]                              Today's Date: 2024       Admit Date: 2024    Visit Dx:     ICD-10-CM ICD-9-CM   1. Acute congestive heart failure, unspecified heart failure type  I50.9 428.0   2. Elevated troponin  R79.89 790.6   3. Atrial fibrillation, unspecified type  I48.91 427.31     Patient Active Problem List   Diagnosis    CHF (congestive heart failure)     History reviewed. No pertinent past medical history.  History reviewed. No pertinent surgical history.   General Information       Row Name 24 1731          Physical Therapy Time and Intention    Document Type evaluation  -EL     Mode of Treatment individual therapy;physical therapy  -EL       Row Name 24 173          General Information    Patient Profile Reviewed yes  -EL     Prior Level of Function independent:;all household mobility;ADL's  -EL       Row Name 24 1731          Living Environment    People in Home alone  -EL       Row Name 24 1731          Home Main Entrance    Number of Stairs, Main Entrance three  -EL       Row Name 24 1731          Stairs Within Home, Primary    Number of Stairs, Within Home, Primary none  -EL       Row Name 24 1731          Cognition    Orientation Status (Cognition) oriented x 4  -EL       Row Name 24 173          Safety Issues/Impairments Affecting Functional Mobility    Impairments Affecting Function (Mobility) balance;endurance/activity tolerance;strength  -EL               User Key  (r) = Recorded By, (t) = Taken By, (c) = Cosigned By      Initials Name Provider Type    EL Travis Delgado PT Physical Therapist                   Mobility       Row Name 24 1735          Bed Mobility    Bed Mobility supine-sit;sit-supine  -EL     Supine-Sit [place] (Bed Mobility) moderate assist (50% patient effort);2 person assist  -EL     Sit-Supine [place] (Bed Mobility) moderate assist (50% patient  effort);2 person assist  -EL       Row Name 11/17/24 1735          Sit-Stand Transfer    Sit-Stand [place] (Transfers) moderate assist (50% patient effort);2 person assist  -EL     Assistive Device (Sit-Stand Transfers) walker, front-wheeled  -EL     Comment, (Sit-Stand Transfer) Reports using lift chair at baseline  -EL               User Key  (r) = Recorded By, (t) = Taken By, (c) = Cosigned By      Initials Name Provider Type    Travis Kennedy PT Physical Therapist                   Obj/Interventions       Row Name 11/17/24 1736          Range of Motion Comprehensive    General Range of Motion bilateral lower extremity ROM WFL  -[facility] Name 11/17/24 1736          Strength Comprehensive (MMT)    General Manual Muscle Testing (MMT) Assessment lower extremity strength deficits identified  -EL     Comment, General Manual Muscle Testing (MMT) Assessment BLE 3+/5 gross  -EL       Row Name 11/17/24 1736          Balance    Balance Assessment standing static balance;sitting static balance  -EL     Static Sitting Balance contact guard  -EL     Static Standing Balance minimal assist  -EL               User Key  (r) = Recorded By, (t) = Taken By, (c) = Cosigned By      Initials Name Provider Type    Travis Kennedy, EMMETT Physical Therapist                   Goals/Plan       [facility] Name 11/17/24 1748          Bed Mobility Goal 1 (PT)    Activity/Assistive Device (Bed Mobility Goal 1, PT) bed mobility activities, all  -EL     Wheeler Level/Cues Needed (Bed Mobility Goal 1, PT) minimum assist (75% or more patient effort)  -EL     Time Frame (Bed Mobility Goal 1, PT) long term goal (LTG);2 weeks  -EL       Row Name 11/17/24 1748          Transfer Goal 1 (PT)    Activity/Assistive Device (Transfer Goal 1, PT) transfers, all;walker, rolling  -EL     Wheeler Level/Cues Needed (Transfer Goal 1, PT) minimum assist (75% or more patient effort)  -EL     Time Frame (Transfer Goal 1, PT) long term goal (LTG);2 weeks  -EL        Row Name 11/17/24 1748          Gait Training Goal 1 (PT)    Activity/Assistive Device (Gait Training Goal 1, PT) gait (walking locomotion);walker, rolling  -EL     [place] Level (Gait Training Goal 1, PT) minimum assist (75% or more patient effort)  -EL     Distance (Gait Training Goal 1, PT) 50  -EL     Time Frame (Gait Training Goal 1, PT) long term goal (LTG);2 weeks  -EL       Row Name 11/17/24 1748          Therapy Assessment/Plan (PT)    Planned Therapy Interventions (PT) neuromuscular re-education;balance training;bed mobility training;transfer training;gait training;patient/family education;strengthening  -EL               User Key  (r) = Recorded By, (t) = Taken By, (c) = Cosigned By      Initials Name Provider Type    Travis Kennedy, PT Physical Therapist                   Clinical Impression       Row Name 11/17/24 1740          Pain    Pretreatment Pain Rating 0/10 - no pain  -EL     Posttreatment Pain Rating 0/10 - no pain  -EL       Row Name 11/17/24 1740          Plan of Care Review    Plan of Care Reviewed With patient  -EL     Outcome Evaluation Pt is a 89 YO M admitted with new onset CHF. Pt reports living at home alone, where he is independent with ADLs, but states he has had difficulty bathing. Typically ambulating with RWx reports having no recent falls, and states he uses a lift chair to come to standing. Pt reports he has been sleeping in lift chair recent as well.. Pt this date demonstrates significant weakness, requiring AX2 for bed mobiltiy and to come to standing. Pt is well below functional baseline and does not appear safe to return home alone, recommendation is SNF at d/c.  -EL       Row Name 11/17/24 1740          Therapy Assessment/Plan (PT)    Rehab Potential (PT) good  -EL     Criteria for Skilled Interventions Met (PT) yes  -EL     Therapy Frequency (PT) 5 times/wk  -EL     Predicted Duration of Therapy Intervention (PT) Until d/c  -EL       Row Name 11/17/24 1740           Vital Signs    O2 Delivery Pre Treatment room air  -EL     O2 Delivery Intra Treatment room air  -EL     O2 Delivery Post Treatment room air  -EL     Pre Patient Position Supine  -EL     Intra Patient Position Standing  -EL     Post Patient Position Supine  -EL       Row Name 11/17/24 1740          Positioning and Restraints    Pre-Treatment Position in bed  -EL     Post Treatment Position bed  -EL     In Bed notified nsg;supine;call light within reach;encouraged to call for assist;exit alarm on  -EL               User Key  (r) = Recorded By, (t) = Taken By, (c) = Cosigned By      Initials Name Provider Type    Travis Kennedy PT Physical Therapist                   Outcome Measures       Row Name 11/17/24 1752          How much help from another person do you currently need...    Turning from your back to your side while in flat bed without using bedrails? 3  -EL     Moving from lying on back to sitting on the side of a flat bed without bedrails? 2  -EL     Moving to and from a bed to a chair (including a wheelchair)? 2  -EL     Standing up from a chair using your arms (e.g., wheelchair, bedside chair)? 2  -EL     Climbing 3-5 steps with a railing? 1  -EL     To walk in hospital room? 1  -EL     AM-PAC 6 Clicks Score (PT) 11  -EL     Highest Level of Mobility Goal 4 --> Transfer to chair/commode  -EL       Row Name 11/17/24 8802          Functional Assessment    Outcome Measure Options AM-PAC 6 Clicks Basic Mobility (PT)  -EL               User Key  (r) = Recorded By, (t) = Taken By, (c) = Cosigned By      Initials Name Provider Type    Travis Kennedy PT Physical Therapist                                 Physical Therapy Education       Title: PT OT SLP Therapies (Done)       Topic: Physical Therapy (Done)       Point: Mobility training (Done)       Learning Progress Summary            Patient Acceptance, E,TB, VU by BELTRAN at 11/17/2024 6205                      Point: Precautions (Done)       Learning Progress  Summary            Patient Acceptance, E,TB, VU by  at 11/17/2024 1755                                      User Key       Initials Effective Dates Name Provider Type Discipline     06/23/20 -  Travis Delgado, EMMETT Physical Therapist PT                  PT Recommendation and Plan  Planned Therapy Interventions (PT): neuromuscular re-education, balance training, bed mobility training, transfer training, gait training, patient/family education, strengthening  Outcome Evaluation: Pt is a 89 YO M admitted with new onset CHF. Pt reports living at home alone, where he is independent with ADLs, but states he has had difficulty bathing. Typically ambulating with RWx reports having no recent falls, and states he uses a lift chair to come to standing. Pt reports he has been sleeping in lift chair recent as well.. Pt this date demonstrates significant weakness, requiring AX2 for bed mobiltiy and to come to standing. Pt is well below functional baseline and does not appear safe to return home alone, recommendation is SNF at d/c.     Time Calculation:   PT Evaluation Complexity  History, PT Evaluation Complexity: 1-2 personal factors and/or comorbidities  Examination of Body Systems (PT Eval Complexity): total of 3 or more elements  Clinical Presentation (PT Evaluation Complexity): evolving  Clinical Decision Making (PT Evaluation Complexity): moderate complexity  Overall Complexity (PT Evaluation Complexity): moderate complexity     PT Charges       Row Name 11/17/24 6076             Time Calculation    Start Time 0914  -EL      Stop Time 0940  -      Time Calculation (min) 26 min  -EL      PT Received On 11/17/24  -      PT - Next Appointment 11/19/24  -      PT Goal Re-Cert Due Date 12/01/24  -                User Key  (r) = Recorded By, (t) = Taken By, (c) = Cosigned By      Initials Name Provider Type    Travis Kennedy, EMMETT Physical Therapist                  Therapy Charges for Today       Code Description Service Date  Service Provider Modifiers Qty    34502045039 HC PT EVAL MOD COMPLEXITY 4 11/17/2024 Travis Delgado, PT GP 1            PT G-Codes  Outcome Measure Options: AM-PAC 6 Clicks Basic Mobility (PT)  AM-PAC 6 Clicks Score (PT): 11  PT Discharge Summary  Anticipated Discharge Disposition (PT): skilled nursing facility    Travis Delgado PT  11/17/2024

## 2024-11-17 NOTE — CONSULTS
Group: [facility]         CONSULT NOTE    Patient Identification:  Sathya Flores  89 y.o.  male  5/7/1930  [id number]            Requesting physician: Attending physician    Reason for Consultation: Hypoxia      History of Present Illness:  89-year-old male presented on 11/17/2024 from urgent care with increasing shortness of breath, chest x-ray on 11/16/2024 showed bilateral alveolar infiltrates suggestive of pulmonary edema      Assessment:  Acute hypoxic respiratory insufficiency  Chest x-ray suggestive of pulmonary edema  Elevated troponin and proBNP  Iron deficiency  Elevated D-dimer  Abnormal UA  Viral panel positive for parainfluenza  MRSA DNA probe positive    Recommendations:    CT PE protocol  Oxygen titration  Low-dose steroids IV Solu-Medrol 20 mg daily  Empiric antibiotic Rocephin  Check urine cultures  DVT prophylaxis Lovenox 40 mg subcu daily  Protonix 40 mg daily            Review of Sytems:  Review of Systems   Unable to perform ROS: Age       Past Medical History:  History reviewed. No pertinent past medical history.    Past Surgical History:  History reviewed. No pertinent surgical history.     Home Meds:  Medications Prior to Admission   Medication Sig Dispense Refill Last Dose/Taking    acetaminophen (TYLENOL) 325 MG tablet Take 2 tablets by mouth Every 4 (Four) Hours As Needed for Mild Pain.       amLODIPine (NORVASC) 5 MG tablet Take 1 tablet by mouth Daily.       cloNIDine (CATAPRES) 0.1 MG tablet Take 1 tablet by mouth 2 (Two) Times a Day.       lisinopril-hydrochlorothiazide (PRINZIDE,ZESTORETIC) 20-12.5 MG per tablet Take 1 tablet by mouth Daily.       PENTOXIFYLLINE ER PO Take 400 mg by mouth 3 (Three) Times a Day.       primidone (MYSOLINE) 250 MG tablet Take 1 tablet by mouth 3 (Three) Times a Day.       vitamin B-6 (PYRIDOXINE) 50 MG tablet Take 0.5 tablets by mouth Daily.          Allergies:  No Known Allergies    Social History:   Social History     Socioeconomic  "History    Marital status:    Tobacco Use    Smoking status: Former     Types: Cigarettes   Vaping Use    Vaping status: Never Used   Substance and Sexual Activity    Alcohol use: Not Currently    Drug use: Never    Sexual activity: Defer       Family History:  History reviewed. No pertinent family history.    Physical Exam:  /73 (BP Location: Left arm, Patient Position: Lying)   Pulse 109   Temp 98.5 °F (36.9 °C) (Oral)   Resp 22   Ht 167.6 cm (66\")   Wt 79.7 kg (175 lb 11.3 oz)   SpO2 (!) 83%   BMI 28.36 kg/m²  Body mass index is 28.36 kg/m². (!) 83% 79.7 kg (175 lb 11.3 oz)  Physical Exam  Cardiovascular:      Heart sounds: Murmur heard.      No gallop.   Pulmonary:      Effort: No respiratory distress.      Breath sounds: No stridor. Rhonchi and rales present. No wheezing.   Chest:      Chest wall: No tenderness.         LABS:  Lab Results   Component Value Date    CALCIUM 9.4 11/17/2024     Results from last 7 days   Lab Units 11/17/24  0622 11/16/24  1815 11/16/24  1238   MAGNESIUM mg/dL  --  1.9  --    SODIUM mmol/L 139 138 136   POTASSIUM mmol/L 4.3 4.4 4.4   CHLORIDE mmol/L 99 102 101   CO2 mmol/L 29.0 25.5 28.4   BUN mg/dL 23 27* 29*   CREATININE mg/dL 0.87 0.81 0.94   GLUCOSE mg/dL 100* 127* 108*   CALCIUM mg/dL 9.4 9.0 8.9   WBC 10*3/mm3 12.50* 10.44 10.80   HEMOGLOBIN g/dL 13.2 11.6* 11.8*   PLATELETS 10*3/mm3 195 195 177   ALT (SGPT) U/L  --   --  21   AST (SGOT) U/L  --   --  18   PROBNP pg/mL  --   --  2,608.0*     Lab Results   Component Value Date    TROPONINT 54 (C) 11/16/2024     Results from last 7 days   Lab Units 11/16/24  1409 11/16/24  1238   HSTROP T ng/L 54* 56*                 Results from last 7 days   Lab Units 11/16/24  1715   ADENOVIRUS DETECTION BY PCR  Not Detected   CORONAVIRUS 229E  Not Detected   CORONAVIRUS HKU1  Not Detected   CORONAVIRUS NL63  Not Detected   CORONAVIRUS OC43  Not Detected   HUMAN METAPNEUMOVIRUS  Not Detected   HUMAN RHINOVIRUS/ENTEROVIRUS  " Not Detected   INFLUENZA B PCR  Not Detected   PARAINFLUENZA 1  Not Detected   PARAINFLUENZA VIRUS 2  Not Detected   PARAINFLUENZA VIRUS 3  Not Detected   PARAINFLUENZA VIRUS 4  Detected*   BORDETELLA PERTUSSIS PCR  Not Detected   CHLAMYDOPHILA PNEUMONIAE PCR  Not Detected   MYCOPLAMA PNEUMO PCR  Not Detected   INFLUENZA A PCR  Not Detected   RSV, PCR  Not Detected             Lab Results   Component Value Date    TSH 0.992 11/16/2024     Estimated Creatinine Clearance: 51.6 mL/min (by C-G formula based on SCr of 0.87 mg/dL).  Results from last 7 days   Lab Units 11/16/24  1715   NITRITE UA  Positive*   WBC UA /HPF Too Numerous to Count*   BACTERIA UA /HPF 4+*   SQUAM EPITHEL UA /HPF None Seen        Imaging:  Imaging Results (Last 24 Hours)       Procedure Component Value Units Date/Time    XR Chest 1 View [[id number]] Collected: 11/16/24 1220     Updated: 11/16/24 1223    Narrative:      XR CHEST 1 VW    Date of Exam: 11/16/2024 12:07 PM EST    Indication: cough    Comparison: Chest x-ray dated 6/17/2020    Findings:  Cardiac silhouette is enlarged. Vague bilateral lung opacities may represent edema or mild infiltrates. No significant pleural effusion or pneumothorax. No acute osseous lesion is seen. There are senescent changes of the aorta and skeletal structures.      Impression:      Impression:  1.Findings compatible with mild CHF. Concomitant mild infiltrates cannot be excluded.      Electronically Signed: Dom Noel MD    11/16/2024 12:21 PM EST    Workstation ID: [id number]              Current Meds:   SCHEDULE  enoxaparin, 40 mg, Subcutaneous, Q24H  metoprolol succinate XL, 12.5 mg, Oral, Q24H  pantoprazole, 40 mg, Intravenous, Q AM  sodium chloride, 10 mL, Intravenous, Q12H      Infusions  Pharmacy to Dose enoxaparin (LOVENOX),       PRNs    acetaminophen **OR** acetaminophen **OR** acetaminophen    senna-docusate sodium **AND** polyethylene glycol **AND** bisacodyl **AND** bisacodyl    Calcium  Replacement - Follow Nurse / BPA Driven Protocol    hydrALAZINE    Magnesium Standard Dose Replacement - Follow Nurse / BPA Driven Protocol    ondansetron ODT **OR** ondansetron    Pharmacy to Dose enoxaparin (LOVENOX)    Phosphorus Replacement - Follow Nurse / BPA Driven Protocol    Potassium Replacement - Follow Nurse / BPA Driven Protocol    [COMPLETED] Insert peripheral IV **AND** sodium chloride    sodium chloride    sodium chloride        Gian Pérez MD  11/17/2024  11:31 EST      Much of this encounter note is an electronic transcription/translation of spoken language to printed text using Dragon Software.

## 2024-11-17 NOTE — CONSULTS
CC--- cough and suspected CHF    Sub  89-year-old pleasant patient has history of hypertension and mild dry cough and was seen in urgent care and chest x-ray suggestive of fluid overload and patient transferred to our hospital service.  Patient denies any chest pain or palpitation or syncope.  He denies any unusual shortness of breath.  Patient was evaluated by cardiology several years ago and underwent a stress test without ischemia with normal EF.  Patient does have history of hypertension and history of tremors.        Social History     Tobacco Use    Smoking status: Former     Types: Cigarettes   Vaping Use    Vaping status: Never Used   Substance Use Topics    Alcohol use: Not Currently    Drug use: Never     Medications Prior to Admission   Medication Sig Dispense Refill Last Dose/Taking    acetaminophen (TYLENOL) 325 MG tablet Take 2 tablets by mouth Every 4 (Four) Hours As Needed for Mild Pain.       amLODIPine (NORVASC) 5 MG tablet Take 1 tablet by mouth Daily.       cloNIDine (CATAPRES) 0.1 MG tablet Take 1 tablet by mouth 2 (Two) Times a Day.       lisinopril-hydrochlorothiazide (PRINZIDE,ZESTORETIC) 20-12.5 MG per tablet Take 1 tablet by mouth Daily.       PENTOXIFYLLINE ER PO Take 400 mg by mouth 3 (Three) Times a Day.       primidone (MYSOLINE) 250 MG tablet Take 1 tablet by mouth 3 (Three) Times a Day.       vitamin B-6 (PYRIDOXINE) 50 MG tablet Take 0.5 tablets by mouth Daily.        Allergies:  Patient has no known allergies.      Physical Exam    General:      well developed, well nourished, in no acute distress.    Head:      normocephalic and atraumatic.    Eyes:      PERRL/EOM intact, conjunctivae and sclerae clear without nystagmus.    Neck:      no  thyromegaly, trachea central with normal respiratory effort  Lungs:      clear bilaterally to auscultation.    Heart:       regular rate and rhythm, S1, S2 without murmurs, rubs, or gallops  Skin:      intact without lesions or rashes.    Psych:       alert and cooperative; normal mood and affect; normal attention span and concentration.          CBC    Results from last 7 days   Lab Units 11/17/24  0622 11/16/24  1815 11/16/24  1238   WBC 10*3/mm3 12.50* 10.44 10.80   HEMOGLOBIN g/dL 13.2 11.6* 11.8*   PLATELETS 10*3/mm3 195 195 177     BMP   Results from last 7 days   Lab Units 11/17/24  0622 11/16/24  1815 11/16/24  1238   SODIUM mmol/L 139 138 136   POTASSIUM mmol/L 4.3 4.4 4.4   CHLORIDE mmol/L 99 102 101   CO2 mmol/L 29.0 25.5 28.4   BUN mg/dL 23 27* 29*   CREATININE mg/dL 0.87 0.81 0.94   GLUCOSE mg/dL 100* 127* 108*   MAGNESIUM mg/dL  --  1.9  --      Radiology(recent) XR Chest 1 View    Result Date: 11/16/2024  Impression: 1.Findings compatible with mild CHF. Concomitant mild infiltrates cannot be excluded. Electronically Signed: Dom Noel MD  11/16/2024 12:21 PM EST  Workstation ID: [id number]       Assessment and plan    Elderly patient with cough with mild fluid overload and elevated BNP  Medical management only  EKG shows sinus rhythm with PACs  Echo reviewed with normal EF with mild MR  Treat with gentle diuresis  Continue antihypertensive medications  D-dimer elevated and CT angio pending  Positive for parainfluenza virus    Electronically signed by Faustino García MD, 11/17/24, 11:24 AM EST.

## 2024-11-17 NOTE — PLAN OF CARE
Goal Outcome Evaluation:  Plan of Care Reviewed With: patient           Outcome Evaluation: Pt is a 89 YO M admitted with new onset CHF. Pt reports living at home alone, where he is independent with ADLs, but states he has had difficulty bathing. Typically ambulating with RWx reports having no recent falls, and states he uses a lift chair to come to standing. Pt reports he has been sleeping in lift chair recent as well.. Pt this date demonstrates significant weakness, requiring AX2 for bed mobiltiy and to come to standing. Pt is well below functional baseline and does not appear safe to return home alone, recommendation is SNF at d/c.    Anticipated Discharge Disposition (PT): skilled nursing facility

## 2024-11-17 NOTE — H&P
Patient Care Team:  Provider, No Known as PCP - Rogelio Khan MD as PCP - Family Medicine    Chief complaint Dyspnea    Subjective     Patient is a 89 y.o. male who presents with past medical history of hypertension, tremors, and glaucoma. He presented from free standing ER due to edema and shortness of breath. Per note from ER he does not lay flat and stay in a recliner. Spoke with son in law and he states patient lives alone and this is his first hospitalization. He states that family check in with him three times daily and more. He has been eating and drinking ok, has had some urinary urgency, and he agrees he is more confused but that is not his norm. Patient at examination is [place] and mildly confused. IN the ED, D-dimer elevated, cxr with pulmonary edema, leukocytosis,elevated troponin, and UTI with parainfluenzal virus and MRSA +.     Review of Systems   Unable to perform ROS: Mental status change          History  History reviewed. No pertinent past medical history.  History reviewed. No pertinent surgical history.  History reviewed. No pertinent family history.  Social History     Tobacco Use    Smoking status: Former     Types: Cigarettes   Vaping Use    Vaping status: Never Used   Substance Use Topics    Alcohol use: Not Currently    Drug use: Never     Medications Prior to Admission   Medication Sig Dispense Refill Last Dose/Taking    acetaminophen (TYLENOL) 325 MG tablet Take 2 tablets by mouth Every 4 (Four) Hours As Needed for Mild Pain.       amLODIPine (NORVASC) 5 MG tablet Take 1 tablet by mouth Daily.       cloNIDine (CATAPRES) 0.1 MG tablet Take 1 tablet by mouth 2 (Two) Times a Day.       lisinopril-hydrochlorothiazide (PRINZIDE,ZESTORETIC) 20-12.5 MG per tablet Take 1 tablet by mouth Daily.       PENTOXIFYLLINE ER PO Take 400 mg by mouth 3 (Three) Times a Day.       primidone (MYSOLINE) 250 MG tablet Take 1 tablet by mouth 3 (Three) Times a Day.       vitamin B-6 (PYRIDOXINE) 50 MG  tablet Take 0.5 tablets by mouth Daily.        Allergies:  Patient has no known allergies.    Objective     Vital Signs  Temp:  [98 °F (36.7 °C)-98.8 °F (37.1 °C)] 98.5 °F (36.9 °C)  Heart Rate:  [] 109  Resp:  [19-29] 22  BP: (112-156)/(50-89) 151/73       Physical Exam  Vitals reviewed.   Constitutional:       Appearance: He is ill-appearing.   HENT:      Head: Normocephalic and atraumatic.      Right Ear: External ear normal.      Left Ear: External ear normal.      Nose: Nose normal.      Mouth/Throat:      Mouth: Mucous membranes are dry.   Eyes:      General:         Right eye: No discharge.         Left eye: No discharge.   Cardiovascular:      Rate and Rhythm: Normal rate and regular rhythm.      Pulses: Normal pulses.      Heart sounds: Normal heart sounds.   Pulmonary:      Effort: Pulmonary effort is normal.      Breath sounds: Normal breath sounds.   Abdominal:      General: Bowel sounds are normal.   Musculoskeletal:         General: Normal range of motion.      Cervical back: Normal range of motion.   Skin:     General: Skin is warm.      Coloration: Skin is pale.   Neurological:      Mental Status: He is alert. He is disoriented.   Psychiatric:         Cognition and Memory: Cognition is impaired.          Results Review:     Imaging Results (Last 24 Hours)       Procedure Component Value Units Date/Time    XR Chest 1 View [[id number]] Collected: 11/16/24 1220     Updated: 11/16/24 1223    Narrative:      XR CHEST 1 VW    Date of Exam: 11/16/2024 12:07 PM EST    Indication: cough    Comparison: Chest x-ray dated 6/17/2020    Findings:  Cardiac silhouette is enlarged. Vague bilateral lung opacities may represent edema or mild infiltrates. No significant pleural effusion or pneumothorax. No acute osseous lesion is seen. There are senescent changes of the aorta and skeletal structures.      Impression:      Impression:  1.Findings compatible with mild CHF. Concomitant mild infiltrates cannot be  excluded.      Electronically Signed: Dom Noel MD    11/16/2024 12:21 PM EST    Workstation ID: [id number]             Lab Results (last 24 hours)       Procedure Component Value Units Date/Time    CBC & Differential [[id number]]  (Abnormal) Collected: 11/17/24 0622    Specimen: Blood from Arm, Right Updated: 11/17/24 0658    Narrative:      The following orders were created for panel order CBC & Differential.  Procedure                               Abnormality         Status                     ---------                               -----------         ------                     CBC Auto Differential[[id number]]        Abnormal            Final result                 Please view results for these tests on the individual orders.    CBC Auto Differential [[id number]]  (Abnormal) Collected: 11/17/24 0622    Specimen: Blood from Arm, Right Updated: 11/17/24 0658     WBC 12.50 10*3/mm3      RBC 4.14 10*6/mm3      Hemoglobin 13.2 g/dL      Hematocrit 40.3 %      MCV 97.3 fL      MCH 31.9 pg      MCHC 32.8 g/dL      RDW 14.5 %      RDW-SD 51.8 fl      MPV 10.1 fL      Platelets 195 10*3/mm3      Neutrophil % 69.2 %      Lymphocyte % 15.4 %      Monocyte % 14.7 %      Eosinophil % 0.2 %      Basophil % 0.2 %      Immature Grans % 0.3 %      Neutrophils, Absolute 8.64 10*3/mm3      Lymphocytes, Absolute 1.93 10*3/mm3      Monocytes, Absolute 1.84 10*3/mm3      Eosinophils, Absolute 0.02 10*3/mm3      Basophils, Absolute 0.03 10*3/mm3      Immature Grans, Absolute 0.04 10*3/mm3      nRBC 0.0 /100 WBC     Basic Metabolic Panel [[id number]]  (Abnormal) Collected: 11/17/24 0622    Specimen: Blood from Arm, Right Updated: 11/17/24 0657     Glucose 100 mg/dL      BUN 23 mg/dL      Creatinine 0.87 mg/dL      Sodium 139 mmol/L      Potassium 4.3 mmol/L      Chloride 99 mmol/L      CO2 29.0 mmol/L      Calcium 9.4 mg/dL      BUN/Creatinine Ratio 26.4     Anion Gap 11.0 mmol/L      eGFR 80.0 mL/min/1.73     Narrative:      GFR  "Normal >60  Chronic Kidney Disease <60  Kidney Failure <15    The GFR formula is only valid for adults with stable renal function between ages 18 and 70.    Extra Tubes [[id number]] Collected: 11/17/24 0622    Specimen: Blood from Arm, Right Updated: 11/17/24 0630    Narrative:      The following orders were created for panel order Extra Tubes.  Procedure                               Abnormality         Status                     ---------                               -----------         ------                     Light Blue Top[[id number]]                                   Final result                 Please view results for these tests on the individual orders.    Light Blue Top [[id number]] Collected: 11/17/24 0622    Specimen: Blood from Arm, Right Updated: 11/17/24 0630     Extra Tube Hold for add-ons.     Comment: Auto resulted       D-dimer, Quantitative [[id number]]  (Abnormal) Collected: 11/16/24 2204    Specimen: Blood from Arm, Right Updated: 11/16/24 2232     D-Dimer, Quantitative 2.86 MCGFEU/mL     Narrative:      According to the assay 's published package insert, a normal (<0.50 MCGFEU/mL) D-dimer result in conjunction with a non-high clinical probability assessment, excludes deep vein thrombosis (DVT) and pulmonary embolism (PE) with high sensitivity.    D-dimer values increase with age and this can make VTE exclusion of an older population difficult. To address this, the [employer], based on best available evidence and recent guidelines, recommends that clinicians use age-adjusted D-dimer thresholds in patients greater than 50 years of age with: a) a low probability of PE who do not meet all Pulmonary Embolism Rule Out Criteria, or b) in those with intermediate probability of PE.   The formula for an age-adjusted D-dimer cut-off is \"age/100\".  For example, a 60 year old patient would have an age-adjusted cut-off of 0.60 MCGFEU/mL and an 80 year old 0.80 MCGFEU/mL.    " Basic Metabolic Panel [[id number]]  (Abnormal) Collected: 11/16/24 1815    Specimen: Blood Updated: 11/16/24 1902     Glucose 127 mg/dL      BUN 27 mg/dL      Creatinine 0.81 mg/dL      Sodium 138 mmol/L      Potassium 4.4 mmol/L      Comment: Specimen hemolyzed.  Result may be falsely elevated.        Chloride 102 mmol/L      CO2 25.5 mmol/L      Calcium 9.0 mg/dL      BUN/Creatinine Ratio 33.3     Anion Gap 10.5 mmol/L      eGFR 81.7 mL/min/1.73     Narrative:      GFR Normal >60  Chronic Kidney Disease <60  Kidney Failure <15    The GFR formula is only valid for adults with stable renal function between ages 18 and 70.    Magnesium [[id number]]  (Normal) Collected: 11/16/24 1815    Specimen: Blood Updated: 11/16/24 1902     Magnesium 1.9 mg/dL     Iron Profile [[id number]]  (Abnormal) Collected: 11/16/24 1815    Specimen: Blood Updated: 11/16/24 1857     Iron 23 mcg/dL      Iron Saturation (TSAT) 10 %      Transferrin 161 mg/dL      TIBC 240 mcg/dL     TSH Rfx On Abnormal To Free T4 [[id number]]  (Normal) Collected: 11/16/24 1815    Specimen: Blood Updated: 11/16/24 1857     TSH 0.992 uIU/mL     CBC & Differential [[id number]]  (Abnormal) Collected: 11/16/24 1815    Specimen: Blood Updated: 11/16/24 1836    Narrative:      The following orders were created for panel order CBC & Differential.  Procedure                               Abnormality         Status                     ---------                               -----------         ------                     CBC Auto Differential[[id number]]        Abnormal            Final result                 Please view results for these tests on the individual orders.    CBC Auto Differential [[id number]]  (Abnormal) Collected: 11/16/24 1815    Specimen: Blood Updated: 11/16/24 1836     WBC 10.44 10*3/mm3      RBC 3.82 10*6/mm3      Hemoglobin 11.6 g/dL      Hematocrit 37.8 %      MCV 99.0 fL      MCH 30.4 pg      MCHC 30.7 g/dL      RDW 14.6 %      RDW-SD 53.0 fl      MPV  10.2 fL      Platelets 195 10*3/mm3      Neutrophil % 65.2 %      Lymphocyte % 19.4 %      Monocyte % 14.0 %      Eosinophil % 0.9 %      Basophil % 0.2 %      Immature Grans % 0.3 %      Neutrophils, Absolute 6.81 10*3/mm3      Lymphocytes, Absolute 2.03 10*3/mm3      Monocytes, Absolute 1.46 10*3/mm3      Eosinophils, Absolute 0.09 10*3/mm3      Basophils, Absolute 0.02 10*3/mm3      Immature Grans, Absolute 0.03 10*3/mm3      nRBC 0.0 /100 WBC     MRSA Screen, PCR (Inpatient) - Swab, Nares [[id number]]  (Abnormal) Collected: 11/16/24 1715    Specimen: Swab from Nares Updated: 11/16/24 1835     MRSA PCR MRSA Detected    Narrative:      The negative predictive value of this diagnostic test is high and should only be used to consider de-escalating anti-MRSA therapy. A positive result may indicate colonization with MRSA and must be correlated clinically.    Respiratory Panel PCR w/COVID-19(SARS-CoV-2) LEVI/NIKO/JOSE ARMANDO/PAD/COR/RYNE In-House, NP Swab in UTM/VTM, 2 HR TAT - Swab, Nasopharynx [[id number]]  (Abnormal) Collected: 11/16/24 1715    Specimen: Swab from Nasopharynx Updated: 11/16/24 1815     ADENOVIRUS, PCR Not Detected     Coronavirus 229E Not Detected     Coronavirus HKU1 Not Detected     Coronavirus NL63 Not Detected     Coronavirus OC43 Not Detected     COVID19 Not Detected     Human Metapneumovirus Not Detected     Human Rhinovirus/Enterovirus Not Detected     Influenza A PCR Not Detected     Influenza B PCR Not Detected     Parainfluenza Virus 1 Not Detected     Parainfluenza Virus 2 Not Detected     Parainfluenza Virus 3 Not Detected     Parainfluenza Virus 4 Detected     RSV, PCR Not Detected     Bordetella pertussis pcr Not Detected     Bordetella parapertussis PCR Not Detected     Chlamydophila pneumoniae PCR Not Detected     Mycoplasma pneumo by PCR Not Detected    Narrative:      In the setting of a positive respiratory panel with a viral infection PLUS a negative procalcitonin without other underlying  concern for bacterial infection, consider observing off antibiotics or discontinuation of antibiotics and continue supportive care. If the respiratory panel is positive for atypical bacterial infection (Bordetella pertussis, Chlamydophila pneumoniae, or Mycoplasma pneumoniae), consider antibiotic de-escalation to target atypical bacterial infection.    Urinalysis With Microscopic If Indicated (No Culture) - Urine, Clean Catch [[id number]]  (Abnormal) Collected: 11/16/24 1715    Specimen: Urine, Clean Catch Updated: 11/16/24 1752     Color, UA Yellow     Appearance, UA Clear     pH, UA 5.5     Specific Gravity, UA 1.017     Glucose, UA Negative     Ketones, UA Negative     Bilirubin, UA Negative     Blood, UA Small (1+)     Protein, UA Trace     Leuk Esterase, UA Large (3+)     Nitrite, UA Positive     Urobilinogen, UA 1.0 E.U./dL    Urinalysis, Microscopic Only - Urine, Clean Catch [[id number]]  (Abnormal) Collected: 11/16/24 1715    Specimen: Urine, Clean Catch Updated: 11/16/24 1752     RBC, UA 0-2 /HPF      WBC, UA Too Numerous to Count /HPF      Bacteria, UA 4+ /HPF      Squamous Epithelial Cells, UA None Seen /HPF      Hyaline Casts, UA None Seen /LPF      Methodology Automated Microscopy    High Sensitivity Troponin T 2Hr [[id number]]  (Abnormal) Collected: 11/16/24 1409    Specimen: Blood Updated: 11/16/24 1429     HS Troponin T 54 ng/L      Troponin T Delta -2 ng/L     Narrative:      High Sensitive Troponin T Reference Range:  <14.0 ng/L- Negative Female for AMI  <22.0 ng/L- Negative Male for AMI  >=14 - Abnormal Female indicating possible myocardial injury.  >=22 - Abnormal Male indicating possible myocardial injury.   Clinicians would have to utilize clinical acumen, EKG, Troponin, and serial changes to determine if it is an Acute Myocardial Infarction or myocardial injury due to an underlying chronic condition.         Single High Sensitivity Troponin T [[id number]]  (Abnormal) Collected: 11/16/24 1238     Specimen: Blood Updated: 11/16/24 1314     HS Troponin T 56 ng/L     Narrative:      High Sensitive Troponin T Reference Range:  <14.0 ng/L- Negative Female for AMI  <22.0 ng/L- Negative Male for AMI  >=14 - Abnormal Female indicating possible myocardial injury.  >=22 - Abnormal Male indicating possible myocardial injury.   Clinicians would have to utilize clinical acumen, EKG, Troponin, and serial changes to determine if it is an Acute Myocardial Infarction or myocardial injury due to an underlying chronic condition.         BNP [[id number]]  (Abnormal) Collected: 11/16/24 1238    Specimen: Blood Updated: 11/16/24 1312     proBNP 2,608.0 pg/mL     Narrative:      This assay is used as an aid in the diagnosis of individuals suspected of having heart failure. It can be used as an aid in the diagnosis of acute decompensated heart failure (ADHF) in patients presenting with signs and symptoms of ADHF to the emergency department (ED). In addition, NT-proBNP of <300 pg/mL indicates ADHF is not likely.    Age Range Result Interpretation  NT-proBNP Concentration (pg/mL:      <50             Positive            >450                   Gray                 300-450                    Negative             <300    50-75           Positive            >900                  Gray                300-900                  Negative            <300      >75             Positive            >1800                  Gray                300-1800                  Negative            <300    Comprehensive Metabolic Panel [[id number]]  (Abnormal) Collected: 11/16/24 1238    Specimen: Blood Updated: 11/16/24 1306     Glucose 108 mg/dL      BUN 29 mg/dL      Creatinine 0.94 mg/dL      Sodium 136 mmol/L      Potassium 4.4 mmol/L      Chloride 101 mmol/L      CO2 28.4 mmol/L      Calcium 8.9 mg/dL      Total Protein 7.0 g/dL      Albumin 3.3 g/dL      ALT (SGPT) 21 U/L      AST (SGOT) 18 U/L      Alkaline Phosphatase 116 U/L      Total Bilirubin 0.2  mg/dL      Globulin 3.7 gm/dL      A/G Ratio 0.9 g/dL      BUN/Creatinine Ratio 30.9     Anion Gap 6.6 mmol/L      eGFR 75.1 mL/min/1.73     Narrative:      GFR Normal >60  Chronic Kidney Disease <60  Kidney Failure <15    The GFR formula is only valid for adults with stable renal function between ages 18 and 70.    CBC & Differential [[id number]]  (Abnormal) Collected: 11/16/24 1238    Specimen: Blood Updated: 11/16/24 1253    Narrative:      The following orders were created for panel order CBC & Differential.  Procedure                               Abnormality         Status                     ---------                               -----------         ------                     CBC Auto Differential[[id number]]        Abnormal            Final result                 Please view results for these tests on the individual orders.    CBC Auto Differential [[id number]]  (Abnormal) Collected: 11/16/24 1238    Specimen: Blood Updated: 11/16/24 1253     WBC 10.80 10*3/mm3      RBC 3.76 10*6/mm3      Hemoglobin 11.8 g/dL      Hematocrit 37.8 %      .5 fL      MCH 31.4 pg      MCHC 31.2 g/dL      RDW 14.6 %      RDW-SD 54.9 fl      MPV 9.5 fL      Platelets 177 10*3/mm3      Neutrophil % 65.8 %      Lymphocyte % 17.8 %      Monocyte % 15.1 %      Eosinophil % 0.7 %      Basophil % 0.3 %      Immature Grans % 0.3 %      Neutrophils, Absolute 7.11 10*3/mm3      Lymphocytes, Absolute 1.92 10*3/mm3      Monocytes, Absolute 1.63 10*3/mm3      Eosinophils, Absolute 0.08 10*3/mm3      Basophils, Absolute 0.03 10*3/mm3      Immature Grans, Absolute 0.03 10*3/mm3     COVID-19 and FLU A/B PCR, 1 HR TAT - Swab, Nasopharynx [[id number]]  (Normal) Collected: 11/16/24 1204    Specimen: Swab from Nasopharynx Updated: 11/16/24 1241     COVID19 Not Detected     Influenza A PCR Not Detected     Influenza B PCR Not Detected    Narrative:      Fact sheet for providers: https://www.fda.gov/media/038277/download    Fact sheet for  patients: https://www.fda.gov/media/735455/download    Test performed by PCR.             I reviewed the patient's new clinical results.    Assessment & Plan     Dyspnea dyspnea  Pulmonary edema  Fluid overload  Parainfluenza virus  MRSA positive  Elevated troponin  Elevated D-dimer  -Cardiology pulmonary following  -Echo ordered/CT PE protocol    UTI  -rocephin  -follow culture    HTN  Amlodipine/clonidine/metoprolol    Essential tremors  -mysoline    DVT prophylaxis:lovenox  GI prophylaxis:protonix  Code status:DNR      I discussed the patient's findings and my recommendations with patient.     Candelaria Spears, APRN  11/17/24  11:22 EST

## 2024-11-17 NOTE — PLAN OF CARE
Goal Outcome Evaluation:                                      Patient remains on room air. No gtts. AOx4, follows commands. Urinates without issue, no BM on shift. Labs collected, MRSA/Resp Swabs collected, urine collected.     Remains PCU level patient.

## 2024-11-18 LAB
ANION GAP SERPL CALCULATED.3IONS-SCNC: 8.7 MMOL/L (ref 5–15)
BASOPHILS # BLD AUTO: 0.03 10*3/MM3 (ref 0–0.2)
BASOPHILS NFR BLD AUTO: 0.2 % (ref 0–1.5)
BUN SERPL-MCNC: 29 MG/DL (ref 8–23)
BUN/CREAT SERPL: 26.9 (ref 7–25)
CALCIUM SPEC-SCNC: 8.9 MG/DL (ref 8.2–9.6)
CHLORIDE SERPL-SCNC: 100 MMOL/L (ref 98–107)
CO2 SERPL-SCNC: 29.3 MMOL/L (ref 22–29)
CREAT SERPL-MCNC: 1.08 MG/DL (ref 0.76–1.27)
DEPRECATED RDW RBC AUTO: 53.3 FL (ref 37–54)
EGFRCR SERPLBLD CKD-EPI 2021: 63.6 ML/MIN/1.73
EOSINOPHIL # BLD AUTO: 0.01 10*3/MM3 (ref 0–0.4)
EOSINOPHIL NFR BLD AUTO: 0.1 % (ref 0.3–6.2)
ERYTHROCYTE [DISTWIDTH] IN BLOOD BY AUTOMATED COUNT: 14.6 % (ref 12.3–15.4)
GLUCOSE SERPL-MCNC: 105 MG/DL (ref 65–99)
HCT VFR BLD AUTO: 38.7 % (ref 37.5–51)
HGB BLD-MCNC: 12.1 G/DL (ref 13–17.7)
IMM GRANULOCYTES # BLD AUTO: 0.07 10*3/MM3 (ref 0–0.05)
IMM GRANULOCYTES NFR BLD AUTO: 0.5 % (ref 0–0.5)
LYMPHOCYTES # BLD AUTO: 3.28 10*3/MM3 (ref 0.7–3.1)
LYMPHOCYTES NFR BLD AUTO: 21.3 % (ref 19.6–45.3)
MCH RBC QN AUTO: 31.2 PG (ref 26.6–33)
MCHC RBC AUTO-ENTMCNC: 31.3 G/DL (ref 31.5–35.7)
MCV RBC AUTO: 99.7 FL (ref 79–97)
MONOCYTES # BLD AUTO: 2.92 10*3/MM3 (ref 0.1–0.9)
MONOCYTES NFR BLD AUTO: 18.9 % (ref 5–12)
NEUTROPHILS NFR BLD AUTO: 59 % (ref 42.7–76)
NEUTROPHILS NFR BLD AUTO: 9.12 10*3/MM3 (ref 1.7–7)
NRBC BLD AUTO-RTO: 0 /100 WBC (ref 0–0.2)
PLATELET # BLD AUTO: 163 10*3/MM3 (ref 140–450)
PMV BLD AUTO: 9.9 FL (ref 6–12)
POTASSIUM SERPL-SCNC: 4.2 MMOL/L (ref 3.5–5.2)
RBC # BLD AUTO: 3.88 10*6/MM3 (ref 4.14–5.8)
SODIUM SERPL-SCNC: 138 MMOL/L (ref 136–145)
WBC NRBC COR # BLD AUTO: 15.43 10*3/MM3 (ref 3.4–10.8)

## 2024-11-18 PROCEDURE — 80048 BASIC METABOLIC PNL TOTAL CA: CPT | Performed by: FAMILY MEDICINE

## 2024-11-18 PROCEDURE — 97530 THERAPEUTIC ACTIVITIES: CPT

## 2024-11-18 PROCEDURE — 97166 OT EVAL MOD COMPLEX 45 MIN: CPT

## 2024-11-18 PROCEDURE — 25010000002 ENOXAPARIN PER 10 MG: Performed by: FAMILY MEDICINE

## 2024-11-18 PROCEDURE — 85025 COMPLETE CBC W/AUTO DIFF WBC: CPT | Performed by: FAMILY MEDICINE

## 2024-11-18 PROCEDURE — 99232 SBSQ HOSP IP/OBS MODERATE 35: CPT | Performed by: NURSE PRACTITIONER

## 2024-11-18 PROCEDURE — 25010000002 METHYLPREDNISOLONE PER 40 MG: Performed by: INTERNAL MEDICINE

## 2024-11-18 PROCEDURE — 97112 NEUROMUSCULAR REEDUCATION: CPT

## 2024-11-18 PROCEDURE — 97110 THERAPEUTIC EXERCISES: CPT

## 2024-11-18 PROCEDURE — 25010000002 CEFTRIAXONE PER 250 MG: Performed by: NURSE PRACTITIONER

## 2024-11-18 RX ORDER — TIMOLOL MALEATE 5 MG/ML
1 SOLUTION/ DROPS OPHTHALMIC EVERY 12 HOURS
Status: DISCONTINUED | OUTPATIENT
Start: 2024-11-18 | End: 2024-11-19

## 2024-11-18 RX ORDER — BRIMONIDINE TARTRATE 2 MG/ML
1 SOLUTION/ DROPS OPHTHALMIC EVERY 12 HOURS
Status: DISCONTINUED | OUTPATIENT
Start: 2024-11-18 | End: 2024-11-19

## 2024-11-18 RX ORDER — PANTOPRAZOLE SODIUM 40 MG/1
40 TABLET, DELAYED RELEASE ORAL
Status: DISCONTINUED | OUTPATIENT
Start: 2024-11-19 | End: 2024-11-22 | Stop reason: HOSPADM

## 2024-11-18 RX ORDER — BRIMONIDINE TARTRATE AND TIMOLOL MALEATE 2; 5 MG/ML; MG/ML
1 SOLUTION OPHTHALMIC EVERY 12 HOURS
COMMUNITY

## 2024-11-18 RX ORDER — LATANOPROST 50 UG/ML
1 SOLUTION/ DROPS OPHTHALMIC NIGHTLY
COMMUNITY

## 2024-11-18 RX ORDER — LATANOPROST 50 UG/ML
1 SOLUTION/ DROPS OPHTHALMIC NIGHTLY
Status: DISCONTINUED | OUTPATIENT
Start: 2024-11-18 | End: 2024-11-22 | Stop reason: HOSPADM

## 2024-11-18 RX ADMIN — BRIMONIDINE TARTRATE 1 DROP: 2 SOLUTION/ DROPS OPHTHALMIC at 21:38

## 2024-11-18 RX ADMIN — METHYLPREDNISOLONE SODIUM SUCCINATE 20 MG: 40 INJECTION, POWDER, FOR SOLUTION INTRAMUSCULAR; INTRAVENOUS at 10:44

## 2024-11-18 RX ADMIN — PRIMIDONE 250 MG: 250 TABLET ORAL at 21:39

## 2024-11-18 RX ADMIN — Medication 10 ML: at 21:39

## 2024-11-18 RX ADMIN — Medication 10 ML: at 10:43

## 2024-11-18 RX ADMIN — PRIMIDONE 250 MG: 250 TABLET ORAL at 10:44

## 2024-11-18 RX ADMIN — LATANOPROST 1 DROP: 50 SOLUTION/ DROPS OPHTHALMIC at 21:38

## 2024-11-18 RX ADMIN — CLONIDINE HYDROCHLORIDE 0.1 MG: 0.1 TABLET ORAL at 10:43

## 2024-11-18 RX ADMIN — PRIMIDONE 250 MG: 250 TABLET ORAL at 17:04

## 2024-11-18 RX ADMIN — METOPROLOL SUCCINATE 12.5 MG: 25 TABLET, EXTENDED RELEASE ORAL at 10:42

## 2024-11-18 RX ADMIN — ENOXAPARIN SODIUM 40 MG: 100 INJECTION SUBCUTANEOUS at 17:05

## 2024-11-18 RX ADMIN — ACETAMINOPHEN 650 MG: 325 TABLET, FILM COATED ORAL at 10:42

## 2024-11-18 RX ADMIN — AMLODIPINE BESYLATE 5 MG: 5 TABLET ORAL at 10:43

## 2024-11-18 RX ADMIN — CEFTRIAXONE 1000 MG: 1 INJECTION, POWDER, FOR SOLUTION INTRAMUSCULAR; INTRAVENOUS at 12:49

## 2024-11-18 RX ADMIN — TIMOLOL MALEATE 1 DROP: 5 SOLUTION OPHTHALMIC at 21:49

## 2024-11-18 NOTE — THERAPY EVALUATION
Patient Name: Sathya Flores  : 1930    MRN: [id number]                              Today's Date: 2024       Admit Date: 2024    Visit Dx:     ICD-10-CM ICD-9-CM   1. Acute congestive heart failure, unspecified heart failure type  I50.9 428.0   2. Elevated troponin  R79.89 790.6   3. Atrial fibrillation, unspecified type  I48.91 427.31     Patient Active Problem List   Diagnosis    CHF (congestive heart failure)     History reviewed. No pertinent past medical history.  History reviewed. No pertinent surgical history.   General Information       Row Name 24 1247          OT Time and Intention    Document Type evaluation  -SP     Mode of Treatment occupational therapy  -SP       Row Name 24 1247          General Information    Patient Profile Reviewed yes  -SP     Prior Level of Function independent:;ADL's  -SP     Barriers to Rehab medically complex  -SP       Row Name 24 1247          Living Environment    People in Home alone  -SP       Row Name 24 1247          Home Main Entrance    Number of Stairs, Main Entrance two  -SP       Row Name 24 1247          Stairs Within Home, Primary    Number of Stairs, Within Home, Primary none  -SP       Row Name 24 1247          Cognition    Orientation Status (Cognition) oriented to;person;place  -SP       Row Name 24 1247          Safety Issues/Impairments Affecting Functional Mobility    Impairments Affecting Function (Mobility) balance;endurance/activity tolerance;strength;coordination;range of motion (ROM)  -SP               User Key  (r) = Recorded By, (t) = Taken By, (c) = Cosigned By      Initials Name Provider Type    SP Ean Kumar OT Occupational Therapist                     Mobility/ADL's       Row Name 24 1250          Bed Mobility    Bed Mobility bed mobility (all) activities  -SP     Supine-Sit [place] (Bed Mobility) moderate assist (50% patient effort);2 person assist  -SP       Row  Name 11/18/24 1250          Transfers    Transfers sit-stand transfer  -SP       Row Name 11/18/24 1250          Sit-Stand Transfer    Sit-Stand [place] (Transfers) maximum assist (25% patient effort);2 person assist  -SP       Row Name 11/18/24 1250          Functional Mobility    Patient was able to Ambulate no, other medical factors prevent ambulation  -SP     Reason Patient was unable to Ambulate Excessive Weakness  -SP       Row Name 11/18/24 1250          Activities of Daily Living    BADL Assessment/Intervention feeding  -SP       Row Name 11/18/24 1250          Self-Feeding Assessment/Training    [place] Level (Feeding) feeding skills;maximum assist (25% patient effort)  -SP     Position (Feeding) supported sitting  -SP               User Key  (r) = Recorded By, (t) = Taken By, (c) = Cosigned By      Initials Name Provider Type    SP Ean Kumar OT Occupational Therapist                   Obj/Interventions       Row Name 11/18/24 1251          Sensory Assessment (Somatosensory)    Sensory Assessment (Somatosensory) UE sensation intact  -SP       Row Name 11/18/24 1251          Range of Motion Comprehensive    Comment, General Range of Motion BUE limited, shoulders 75*  -SP       Row Name 11/18/24 1251          Strength Comprehensive (MMT)    Comment, General Manual Muscle Testing (MMT) Assessment BUE grossly 3+/5  -SP       Row Name 11/18/24 1251          Balance    Balance Assessment sit to stand dynamic balance;standing dynamic balance;sitting static balance  -SP     Static Sitting Balance standby assist;contact guard  -SP     Position, Sitting Balance sitting edge of bed  -SP     Sit to Stand Dynamic Balance maximum assist;2-person assist  -SP     Dynamic Standing Balance moderate assist;2-person assist  -SP               User Key  (r) = Recorded By, (t) = Taken By, (c) = Cosigned By      Initials Name Provider Type    Ean Alicia OT Occupational Therapist                    Goals/Plan       Row Name 11/18/24 1259          Bathing Goal 1 (OT)    Activity/Device (Bathing Goal 1, OT) bathing skills, all  -SP     [place] Level/Cues Needed (Bathing Goal 1, OT) maximum assist (25-49% patient effort)  -SP     Time Frame (Bathing Goal 1, OT) 2 weeks  -SP     Strategies/Barriers (Bathing Goal 1, OT) until d/c  -SP       Row Name 11/18/24 1256          Dressing Goal 1 (OT)    Activity/Device (Dressing Goal 1, OT) dressing skills, all  -SP     [place]/Cues Needed (Dressing Goal 1, OT) maximum assist (25-49% patient effort)  -SP     Time Frame (Dressing Goal 1, OT) 2 weeks  -SP     Strategies/Barriers (Dressing Goal 1, OT) until d/c  -SP       Row Name 11/18/24 1256          Toileting Goal 1 (OT)    Activity/Device (Toileting Goal 1, OT) toileting skills, all  -SP     [place] Level/Cues Needed (Toileting Goal 1, OT) maximum assist (25-49% patient effort)  -SP     Time Frame (Toileting Goal 1, OT) 2 weeks  -SP     Strategies/Barriers (Toileting Goal 1, OT) until d/c  -SP       Row Name 11/18/24 1258          Therapy Assessment/Plan (OT)    Planned Therapy Interventions (OT) activity tolerance training;IADL retraining;cognitive/visual perception retraining;occupation/activity based interventions;patient/caregiver education/training;ROM/therapeutic exercise;strengthening exercise;transfer/mobility retraining;passive ROM/stretching  -SP               User Key  (r) = Recorded By, (t) = Taken By, (c) = Cosigned By      Initials Name Provider Type    Ean Alicia OT Occupational Therapist                   Clinical Impression       Row Name 11/18/24 1258          Pain Assessment    Pretreatment Pain Rating 0/10 - no pain  -SP     Posttreatment Pain Rating 0/10 - no pain  -SP       Row Name 11/18/24 1259          Plan of Care Review    Plan of Care Reviewed With patient  -SP     Outcome Evaluation Pt is a 89 y/o male admitted to [facility] on 11/16[facility]24 with c/o dyspnea, admitted with new  onset CHF. PMHx significant for hypertension, tremors, and glaucoma. At baseline, pt reports he resides alone with x2 SOCORRO and utilizes lift chair, typically IND in ADLs; questionable historian this date. Pt alert and oriented to name/ and place. Pt requires mod assist x2 for bed mobility and max assist x2 to come to standing 2/2 posterior lean. Pt transferred from bed to chair with mod assist x2. Upon sitting supported in chair, pt requires max assist to feed self. Pt appears to be significantly below baseline with diminished safety awareness and increased confusion adversely impacting independence and safety with ADLs and warranting the need for skilled OT intervention. OT recommending SNF when medically appropriate.  -SP       Row Name 24 1255          Therapy Assessment/Plan (OT)    Criteria for Skilled Therapeutic Interventions Met (OT) yes;meets criteria;skilled treatment is necessary  -SP     Therapy Frequency (OT) 5 times/wk  -SP     Predicted Duration of Therapy Intervention (OT) until d/c  -SP       Row Name 24 1255          Therapy Plan Review/Discharge Plan (OT)    Anticipated Discharge Disposition (OT) skilled nursing facility  -SP       Row Name 24 1255          Vital Signs    O2 Delivery Pre Treatment supplemental O2  5L O2 NC  -SP     O2 Delivery Intra Treatment supplemental O2  -SP     Post SpO2 (%) 98  -SP     O2 Delivery Post Treatment supplemental O2  -SP     Pre Patient Position Supine  -SP     Intra Patient Position Standing  -SP     Post Patient Position Sitting  -SP       Row Name 24 1255          Positioning and Restraints    Pre-Treatment Position in bed  -SP     Post Treatment Position chair  -SP     In Chair notified nsg;reclined;call light within reach;encouraged to call for assist;exit alarm on  -SP               User Key  (r) = Recorded By, (t) = Taken By, (c) = Cosigned By      Initials Name Provider Type    Ean Alicia, OT Occupational Therapist                    Outcome Measures       Row Name 11/18/24 1257          How much help from another is currently needed...    Putting on and taking off regular lower body clothing? 2  -SP     Bathing (including washing, rinsing, and drying) 2  -SP     Toileting (which includes using toilet bed pan or urinal) 2  -SP     Putting on and taking off regular upper body clothing 2  -SP     Taking care of personal grooming (such as brushing teeth) 2  -SP     Eating meals 2  -SP     AM-PAC 6 Clicks Score (OT) 12  -SP       Row Name 11/18/24 1112          How much help from another person do you currently need...    Turning from your back to your side while in flat bed without using bedrails? 3  -MB     Moving from lying on back to sitting on the side of a flat bed without bedrails? 2  -MB     Moving to and from a bed to a chair (including a wheelchair)? 2  -MB     Standing up from a chair using your arms (e.g., wheelchair, bedside chair)? 2  -MB     Climbing 3-5 steps with a railing? 1  -MB     To walk in hospital room? 1  -MB     AM-PAC 6 Clicks Score (PT) 11  -MB     Highest Level of Mobility Goal 4 --> Transfer to chair/commode  -MB       Row Name 11/18/24 1257          Functional Assessment    Outcome Measure Options AM-PAC 6 Clicks Daily Activity (OT)  -SP               User Key  (r) = Recorded By, (t) = Taken By, (c) = Cosigned By      Initials Name Provider Type    Sherly Mcclendon, RN Registered Nurse    Ean Alicia OT Occupational Therapist                    Occupational Therapy Education       Title: PT OT SLP Therapies (In Progress)       Topic: Occupational Therapy (In Progress)       Point: ADL training (Done)       Description:   Instruct learner(s) on proper safety adaptation and remediation techniques during self care or transfers.   Instruct in proper use of assistive devices.                  Learning Progress Summary            Patient Acceptance, E,TB, VU,NR by SP at 11/18/2024 1257                       Point: Home exercise program (Not Started)       Description:   Instruct learner(s) on appropriate technique for monitoring, assisting and/or progressing therapeutic exercises/activities.                  Learner Progress:  Not documented in this visit.              Point: Precautions (Not Started)       Description:   Instruct learner(s) on prescribed precautions during self-care and functional transfers.                  Learner Progress:  Not documented in this visit.              Point: Body mechanics (Done)       Description:   Instruct learner(s) on proper positioning and spine alignment during self-care, functional mobility activities and/or exercises.                  Learning Progress Summary            Patient Acceptance, E,TB, VU,NR by SP at 2024 1257                                      User Key       Initials Effective Dates Name Provider Type Discipline    SP 11/15/23 -  Ean Kumar OT Occupational Therapist OT                  OT Recommendation and Plan  Planned Therapy Interventions (OT): activity tolerance training, IADL retraining, cognitive/visual perception retraining, occupation/activity based interventions, patient/caregiver education/training, ROM/therapeutic exercise, strengthening exercise, transfer/mobility retraining, passive ROM/stretching  Therapy Frequency (OT): 5 times/wk  Plan of Care Review  Plan of Care Reviewed With: patient  Outcome Evaluation: Pt is a 89 y/o male admitted [facility] on 24 with c/o dyspnea, admitted with new onset CHF. PMHx significant for hypertension, tremors, and glaucoma. At baseline, pt reports he resides alone with x2 [place] and utilizes lift chair, typically IND in ADLs; questionable historian this date. Pt alert and oriented to name/ and place. Pt requires mod assist x2 for bed mobility and max assist x2 to come to standing 2/2 posterior lean. Pt transferred from bed to chair with mod assist x2. Upon sitting supported in chair, pt  requires max assist to feed self. Pt appears to be significantly below baseline with diminished safety awareness and increased confusion adversely impacting independence and safety with ADLs and warranting the need for skilled OT intervention. OT recommending SNF when medically appropriate.     Time Calculation:         Time Calculation- OT       Row Name 11/18/24 1257             Time Calculation- OT    OT Start Time 0930  -SP      OT Stop Time 0958  -SP      OT Time Calculation (min) 28 min  -SP      OT Received On 11/18/24  -SP      OT - Next Appointment 11/19/24  -SP      OT Goal Re-Cert Due Date 12/02/24  -SP                User Key  (r) = Recorded By, (t) = Taken By, (c) = Cosigned By      Initials Name Provider Type    SP Ean Kumar OT Occupational Therapist                  Therapy Charges for Today       Code Description Service Date Service Provider Modifiers Qty    99109670418 HC OT EVAL MOD COMPLEXITY 4 11/18/2024 Ean Kumar OT GO 1                 Ean Kumar OT  11/18/2024

## 2024-11-18 NOTE — CASE MANAGEMENT/SOCIAL WORK
Discharge Planning Assessment   Rory     Patient Name: Sathya Flores  MRN: [id number]  Today's Date: 11/18/2024    Admit Date: 11/16/2024    Plan: Return home, SNF list pending   Discharge Needs Assessment       Row Name 11/18/24 1311       Living Environment    People in Home alone    Current Living Arrangements home    Potentially Unsafe Housing Conditions none    In the past 12 months has the electric, gas, oil, or water company threatened to shut off services in your home? No    Primary Care Provided by child(dori)    Provides Primary Care For no one    Family Caregiver if Needed child(dori), adult    Family Caregiver Names Dtr Tamela Carla and other family    Quality of Family Relationships helpful;involved;supportive    Able to Return to Prior Arrangements yes       Resource/Environmental Concerns    Resource/Environmental Concerns none    Transportation Concerns none       Transportation Needs    In the past 12 months, has lack of transportation kept you from medical appointments or from getting medications? no    In the past 12 months, has lack of transportation kept you from meetings, work, or from getting things needed for daily living? No       Food Insecurity    Within the past 12 months, you worried that your food would run out before you got the money to buy more. Never true    Within the past 12 months, the food you bought just didn't last and you didn't have money to get more. Never true       Transition Planning    Patient/Family Anticipates Transition to home with family    Patient/Family Anticipated Services at Transition none    Transportation Anticipated family or friend will provide       Discharge Needs Assessment    Readmission Within the Last 30 Days no previous admission in last 30 days    Equipment Currently Used at Home walker, standard;rollator    Concerns to be Addressed care coordination/care conferences;discharge planning    Do you want help finding or keeping work or a job?  Patient declined    Do you want help with school or training? For example, starting or completing job training or getting a high school diploma, GED or equivalent Patient declined    Anticipated Changes Related to Illness none    Equipment Needed After Discharge none    Provided Post Acute Provider List? Yes    Post Acute Provider List Nursing Home    Provided Post Acute Provider Quality & Resource List? N/A    Delivered To Support Person    Support Person Zulay Wells    Method of Delivery Telephone  list left in room with dtr Carla    Offered/Gave Vendor List yes                   Discharge Plan       Row Name 11/18/24 1312       Plan    Plan Return home, SNF list pending    Plan Comments CM met with patient and daughter Carla at bedside to discuss discharge planning. Patient lives at home alone. Daughter Carla states her sister Russell and family primarily assist patient in bathing, and other iADLs. PT is recommending SNF at discharge, discussed with Carla and CM called dtr Russell and discussed over the phone, left SNF list in the room because Russell will be up to the hospital later today to review and provide choices to CM. PCP and pharmacy confirmed, agreeable to [facility]. Denies issues affording medications and no issues with transportation. No current OP PT/HHC. Family will transport at discharge.      Row Name 11/18/24 1026       Plan    Plan Comments DC Barriers: CT PE protocol, O2 titration, IV steroids and Rocephin, following urine cx. Pulm and cardio consulted. Per cardio treat with gentle diuresis, CT angio pending, parainfluenza virus +                  Continued Care and Services - Admitted Since 11/16/2024    No active coordination exists for this encounter.        Demographic Summary       Row Name 11/18/24 1310       General Information    Admission Type inpatient    Arrived From emergency department    Required Notices Provided Important Message from Medicare    Referral Source emergency  department    Reason for Consult care coordination/care conference;discharge planning    Preferred Language English       Contact Information    Permission Granted to Share Info With                    Functional Status       Row Name 11/18/24 1310       Functional Status    Usual Activity Tolerance moderate    Current Activity Tolerance moderate       Functional Status, IADL    Medications assistive equipment and person    Meal Preparation assistive equipment and person    Housekeeping assistive equipment and person    Laundry assistive equipment and person    Shopping assistive equipment and person       Mental Status Summary    Recent Changes in Mental Status/Cognitive Functioning no changes               Elza Esquivel RN      [facility]  Office: [phone]  Cell: [phone]  Fax # [phone]

## 2024-11-18 NOTE — PROGRESS NOTES
LOS: 2 days   Patient Care Team:  Provider, No Known as PCP - Rogelio Khan MD as PCP - Family Medicine    Subjective     Interval History: No significant change    Patient Complaints: Confused, unable to voice specific complaints.    History taken from: patient    Review of Systems   Unable to perform ROS: Mental status change           Objective     Vital Signs  Temp:  [98.5 °F (36.9 °C)-100.6 °F (38.1 °C)] 98.9 °F (37.2 °C)  Heart Rate:  [] 82  Resp:  [20-37] 20  BP: ()/(43-75) 112/55    Physical Exam:     General Appearance:  Awake, sitting up in chair, hard of hearing, follows commands well   Head:    Normocephalic, without obvious abnormality, atraumatic   Eyes:            Lids and lashes normal, conjunctivae and sclerae normal, no   icterus, no pallor, corneas clear, PERRLA   Ears:    Ears appear intact with no abnormalities noted   Throat:   No oral lesions, no thrush, oral mucosa moist   Neck:   No adenopathy, supple, trachea midline, no thyromegaly, no   carotid bruit, no JVD   Lungs:     Clear to auscultation,respirations regular, even and                  unlabored    Heart:    Regular rhythm and normal rate, normal S1 and S2, no            murmur, no gallop, no rub, no click   Chest Wall:    No abnormalities observed   Abdomen:     Normal bowel sounds, no masses, no organomegaly, soft        Non-tender non-distended, no guarding,   Extremities:   Moves all extremities well, no edema, no cyanosis, no             Redness   Pulses:   Pulses palpable and equal bilaterally   Skin:   No bleeding, bruising or rash   Lymph nodes:   No palpable adenopathy   Neurologic:   Cranial nerves 2 - 12 grossly intact, sensation intact, DTR       present and equal bilaterally        Results Review:    Lab Results (last 24 hours)       Procedure Component Value Units Date/Time    Urine Culture - Urine, Urine, Clean Catch [[id number]]  (Abnormal) Collected: 11/17/24 1213    Specimen: Urine, Clean  Catch Updated: 11/18/24 1324     Urine Culture >100,000 CFU/mL Gram Negative Bacilli    Narrative:      Colonization of the urinary tract without infection is common. Treatment is discouraged unless the patient is symptomatic, pregnant, or undergoing an invasive urologic procedure.    Basic Metabolic Panel [[id number]]  (Abnormal) Collected: 11/18/24 0456    Specimen: Blood Updated: 11/18/24 0539     Glucose 105 mg/dL      BUN 29 mg/dL      Creatinine 1.08 mg/dL      Sodium 138 mmol/L      Potassium 4.2 mmol/L      Comment: Specimen hemolyzed.  Result may be falsely elevated.        Chloride 100 mmol/L      CO2 29.3 mmol/L      Calcium 8.9 mg/dL      BUN/Creatinine Ratio 26.9     Anion Gap 8.7 mmol/L      eGFR 63.6 mL/min/1.73     Narrative:      GFR Normal >60  Chronic Kidney Disease <60  Kidney Failure <15    The GFR formula is only valid for adults with stable renal function between ages 18 and 70.    CBC & Differential [[id number]]  (Abnormal) Collected: 11/18/24 0456    Specimen: Blood Updated: 11/18/24 0502    Narrative:      The following orders were created for panel order CBC & Differential.  Procedure                               Abnormality         Status                     ---------                               -----------         ------                     CBC Auto Differential[[id number]]        Abnormal            Final result                 Please view results for these tests on the individual orders.    CBC Auto Differential [[id number]]  (Abnormal) Collected: 11/18/24 0456    Specimen: Blood Updated: 11/18/24 0502     WBC 15.43 10*3/mm3      RBC 3.88 10*6/mm3      Hemoglobin 12.1 g/dL      Hematocrit 38.7 %      MCV 99.7 fL      MCH 31.2 pg      MCHC 31.3 g/dL      RDW 14.6 %      RDW-SD 53.3 fl      MPV 9.9 fL      Platelets 163 10*3/mm3      Neutrophil % 59.0 %      Lymphocyte % 21.3 %      Monocyte % 18.9 %      Eosinophil % 0.1 %      Basophil % 0.2 %      Immature Grans % 0.5 %       Neutrophils, Absolute 9.12 10*3/mm3      Lymphocytes, Absolute 3.28 10*3/mm3      Monocytes, Absolute 2.92 10*3/mm3      Eosinophils, Absolute 0.01 10*3/mm3      Basophils, Absolute 0.03 10*3/mm3      Immature Grans, Absolute 0.07 10*3/mm3      nRBC 0.0 /100 WBC              Imaging Results (Last 24 Hours)       Procedure Component Value Units Date/Time    CT Chest Without Contrast Diagnostic [[id number]] Collected: 11/17/24 2013     Updated: 11/17/24 2020    Narrative:      CT CHEST WO CONTRAST DIAGNOSTIC    Date of Exam: 11/17/2024 8:00 PM EST    Indication: pulm edema vs infiltrate.    Comparison: Chest CT dated 8/18/2021    Technique: Axial CT images were obtained of the chest without contrast administration.  Sagittal and coronal reconstructions were performed.  Automated exposure control and iterative reconstruction methods were used.      FINDINGS:    Thoracic inlet: Unremarkable.    Great vessels: Atherosclerotic plaque is seen within the thoracic aorta and proximal arch vessels.    Mediastinum/Anna: Scattered subcentimeter mediastinal lymph nodes are noted, nonspecific. The esophagus appears unremarkable.    Lung parenchyma: Motion artifact limits evaluation of the lungs. Paraseptal emphysematous changes noted within the bilateral upper lobes. Mild hypoventilatory changes within the bilateral lung bases. Mild bilateral lower lobe infiltrates cannot be   excluded.    Trachea and airways: The trachea and central airways appear unremarkable.    Pleural space: No significant pleural effusion or pneumothorax.    Heart and pericardium: Coronary artery calcifications are present. Otherwise, the heart and pericardium appear unremarkable.    Chest wall: No acute or suspicious osseous or soft tissue lesion is identified.    Upper abdomen: No acute abnormality is identified within the visualized upper abdomen.      Impression:      1.Examination is limited due to motion artifact.  2.Mild bilateral lower lobe  infiltrates cannot be excluded.  3.Paraseptal emphysematous changes within the bilateral upper lobes.  4.Additional findings as detailed above.        Electronically Signed: Dom Noel MD    11/17/2024 8:18 PM EST    Workstation ID: [id number]                 I reviewed the patient's new clinical results.    Medication Review:   Scheduled Meds:amLODIPine, 5 mg, Oral, Daily  brimonidine, 1 drop, Right Eye, Q12H   And  timolol, 1 drop, Right Eye, Q12H  cefTRIAXone, 1,000 mg, Intravenous, Q24H  cloNIDine, 0.1 mg, Oral, BID  enoxaparin, 40 mg, Subcutaneous, Q24H  latanoprost, 1 drop, Right Eye, Nightly  methylPREDNISolone sodium succinate, 20 mg, Intravenous, Daily  metoprolol succinate XL, 12.5 mg, Oral, Q24H  primidone, 250 mg, Oral, TID  sodium chloride, 10 mL, Intravenous, Q12H      Continuous Infusions:Pharmacy to Dose enoxaparin (LOVENOX),       PRN Meds:.  acetaminophen **OR** acetaminophen **OR** acetaminophen    senna-docusate sodium **AND** polyethylene glycol **AND** bisacodyl **AND** bisacodyl    Calcium Replacement - Follow Nurse / BPA Driven Protocol    hydrALAZINE    Magnesium Standard Dose Replacement - Follow Nurse / BPA Driven Protocol    ondansetron ODT **OR** ondansetron    Pharmacy to Dose enoxaparin (LOVENOX)    Phosphorus Replacement - Follow Nurse / BPA Driven Protocol    Potassium Replacement - Follow Nurse / BPA Driven Protocol    [COMPLETED] Insert peripheral IV **AND** sodium chloride    sodium chloride    sodium chloride     Assessment & Plan       CHF (congestive heart failure)  -Improved with diuresis;  Pulmonary hypertension-severe  Essential hypertension-metoprolol, clonidine, amlodipine  Chronic tremor-primidone  Urinary tract infection-preliminary culture results noted; continue ceftriaxone  Parainfluenza virus-supportive care, steroids, bronchodilators  DVT prophylaxis-Lovenox  GERD-PPI          Plan for disposition: Likely needs SNF before returning home.    Chacha Mccarty,  MD  11/18/24  18:24 EST

## 2024-11-18 NOTE — PLAN OF CARE
Goal Outcome Evaluation:  Plan of Care Reviewed With: patient       Outcome Evaluation: Pt is a 89 y/o male admitted to [facility] on 24 with c/o dyspnea, admitted with new onset CHF. PMHx significant for hypertension, tremors, and glaucoma. At baseline, pt reports he resides alone with x2 SOCORRO and utilizes lift chair, typically IND in ADLs; questionable historian this date. Pt alert and oriented to name/ and place. Pt requires mod assist x2 for bed mobility and max assist x2 to come to standing 2/2 posterior lean. Pt transferred from bed to chair with mod assist x2. Upon sitting supported in chair, pt requires max assist to feed self. Pt appears to be significantly below baseline with diminished safety awareness and increased confusion adversely impacting independence and safety with ADLs and warranting the need for skilled OT intervention. OT recommending SNF when medically appropriate.    Anticipated Discharge Disposition (OT): skilled nursing facility

## 2024-11-18 NOTE — THERAPY TREATMENT NOTE
"Subjective: Pt agreeable to therapeutic plan of care.    Objective:     Precautions - 5L O2    Bed mobility - Mod-A and Assist x 2    Transfers - Mod-A, Assist x 2, and with rolling walker to come to standing; MaxA with 2nd person Ford for stand-pivot to bedside recliner.    Balance - ModAx2 with RW for standing balance; significant posterior lean. Pt aware, but unable to weight-shift to correct.    Ambulation - N/A    Therapeutic Exercise - 10 Reps B LE AROM unsupported sitting / EOB    Vitals: Tachycardic; HR max 135 standing    Pain: 0 VAS   Location: N/A  Intervention for pain: N/A    Education: Provided education on the importance of mobility in the acute care setting, Verbal/Tactile Cues, Transfer Training, and Energy conservation strategies    Assessment: Sathya Flores is a 89 y/o M being monitored for (+) parainfluenza, acute hypoxic resp insufficiency who presents with functional mobility impairments which indicate the need for skilled intervention. Pt presents with resting lip tremor, R hand tremor worse than L, BLE rigidity, bradykinesia with bed mobility and transfers, and poor postural awareness. Pt requires mod-maxAx2 throughout for bed mobility, STS, standing balance, and squat-pivot to recliner. Pt's tremor impacting his ability to perform ADLs and functional mobility safely. Pt's symptom presentation with concern for PD. Pt is far below baseline function and would benefit from SNF. Tolerating session today without incident. Will continue to follow and progress as tolerated.     Plan/Recommendations:   If medically appropriate, Moderate Intensity Therapy recommended post-acute care. This is recommended as therapy feels the patient would require 3-4 days per week and wouldn't tolerate \"3 hour daily\" rehab intensity. SNF would be the preferred choice. If the patient does not agree to SNF, arrange HH or OP depending on home bound status. If patient is medically complex, consider LTACH. Pt requires no " DME at discharge.     Pt desires Skilled Rehab placement at discharge. Pt cooperative; agreeable to therapeutic recommendations and plan of care.         Basic Mobility 6-click:  Rollin = Total, A lot = 2, A little = 3; 4 = None  Supine>Sit:   1 = Total, A lot = 2, A little = 3; 4 = None   Sit>Stand with arms:  1 = Total, A lot = 2, A little = 3; 4 = None  Bed>Chair:   1 = Total, A lot = 2, A little = 3; 4 = None  Ambulate in room:  1 = Total, A lot = 2, A little = 3; 4 = None  3-5 Steps with railin = Total, A lot = 2, A little = 3; 4 = None  Score: 10    Modified Irina: N/A = No pre-op stroke/TIA    Post-Tx Position: Up in Chair, Alarms activated, and Call light and personal items within reach  PPE: gloves, surgical mask, and gown    Therapy Charges for Today       Code Description Service Date Service Provider Modifiers Qty    60261915211 HC PT THERAPEUTIC ACT EA 15 MIN 2024 Diane Miranda, PT GP 1    46118225041 HC PT THER PROC EA 15 MIN 2024 Diane Miranda, PT GP 1    01661586058 HC PT NEUROMUSC RE EDUCATION EA 15 MIN 2024 Diane Miranda, PT GP 1           PT Charges       Row Name 24 1247 24 0524          Time Calculation    Start Time 0930  -OD --     Stop Time 0955  -OD --     Time Calculation (min) 25 min  -OD --     PT Received On 24  -OD --     PT - Next Appointment 24  -OD 24  -        Time Calculation- PT    Total Timed Code Minutes- PT 25 minute(s)  -OD --               User Key  (r) = Recorded By, (t) = Taken By, (c) = Cosigned By      Initials Name Provider Type    Torrie Hart, PT Physical Therapist    OD Diane Miranda PT Physical Therapist

## 2024-11-18 NOTE — PROGRESS NOTES
[facility] (formerly [facility] CARDIOLOGY  [facility]        LOS:  LOS: 2 days   Patient Name: Sathya Flores  Age/Sex: 89 y.o. male  : 1930  MRN: [id number]    Day of Service: 24   Length of Stay: 2  Encounter Provider: BEBO Hess  Place of Service: [facility] CARDIOLOGY  Patient Care Team:  Provider, No Known as PCP - Rogelio Khan MD as PCP - Family Medicine    Subjective:     Chief Complaint:  F/U HF    Subjective:   Patient sitting up in chair.  Appears lethargic.  Reports breathing a little better.    Current Medications:   Scheduled Meds:amLODIPine, 5 mg, Oral, Daily  cefTRIAXone, 1,000 mg, Intravenous, Q24H  cloNIDine, 0.1 mg, Oral, BID  enoxaparin, 40 mg, Subcutaneous, Q24H  methylPREDNISolone sodium succinate, 20 mg, Intravenous, Daily  metoprolol succinate XL, 12.5 mg, Oral, Q24H  primidone, 250 mg, Oral, TID  sodium chloride, 10 mL, Intravenous, Q12H      Continuous Infusions:Pharmacy to Dose enoxaparin (LOVENOX),         Allergies:  No Known Allergies    ROS    Objective:     Temp:  [98.5 °F (36.9 °C)-100.6 °F (38.1 °C)] 98.9 °F (37.2 °C)  Heart Rate:  [] 82  Resp:  [20-37] 20  BP: ()/(43-76) 112/55     Intake/Output Summary (Last 24 hours) at 2024 1552  Last data filed at 2024 1200  Gross per 24 hour   Intake 750 ml   Output 400 ml   Net 350 ml     Body mass index is 27.83 kg/m².      24  1144 24  1630 24  0405   Weight: 79.8 kg (176 lb) 79.7 kg (175 lb 11.3 oz) 78.2 kg (172 lb 6.4 oz)         Physical Exam:  Neuro:  CV:  Resp:  GI:  Ext:  Tele: AAOx3, generalized weakness  S1S2 RRR, no murmur  Nonlabored, faint wheezing  BS+, abd soft  Pedal pulses palp, no edema  SR with PACs                                                   Lab Review:   Results from last 7 days   Lab Units 24  0456 24  0622 24  1815 24  1238   SODIUM mmol/L 138 139   < > 136  "  POTASSIUM mmol/L 4.2 4.3   < > 4.4   CHLORIDE mmol/L 100 99   < > 101   CO2 mmol/L 29.3* 29.0   < > 28.4   BUN mg/dL 29* 23   < > 29*   CREATININE mg/dL 1.08 0.87   < > 0.94   GLUCOSE mg/dL 105* 100*   < > 108*   CALCIUM mg/dL 8.9 9.4   < > 8.9   AST (SGOT) U/L  --   --   --  18   ALT (SGPT) U/L  --   --   --  21    < > = values in this interval not displayed.     Results from last 7 days   Lab Units 11/16/24  1409 11/16/24  1238   HSTROP T ng/L 54* 56*     Results from last 7 days   Lab Units 11/18/24  0456 11/17/24  0622   WBC 10*3/mm3 15.43* 12.50*   HEMOGLOBIN g/dL 12.1* 13.2   HEMATOCRIT % 38.7 40.3   PLATELETS 10*3/mm3 163 195         Results from last 7 days   Lab Units 11/16/24  1815   MAGNESIUM mg/dL 1.9           Invalid input(s): \"LDLCALC\"  Results from last 7 days   Lab Units 11/16/24  1238   PROBNP pg/mL 2,608.0*     Results from last 7 days   Lab Units 11/16/24  1815   TSH uIU/mL 0.992       Recent Radiology:  Imaging Results (Most Recent)       Procedure Component Value Units Date/Time    CT Chest Without Contrast Diagnostic [[id number]] Collected: 11/17/24 2013     Updated: 11/17/24 2020    Narrative:      CT CHEST WO CONTRAST DIAGNOSTIC    Date of Exam: 11/17/2024 8:00 PM EST    Indication: pulm edema vs infiltrate.    Comparison: Chest CT dated 8/18/2021    Technique: Axial CT images were obtained of the chest without contrast administration.  Sagittal and coronal reconstructions were performed.  Automated exposure control and iterative reconstruction methods were used.      FINDINGS:    Thoracic inlet: Unremarkable.    Great vessels: Atherosclerotic plaque is seen within the thoracic aorta and proximal arch vessels.    Mediastinum/Anna: Scattered subcentimeter mediastinal lymph nodes are noted, nonspecific. The esophagus appears unremarkable.    Lung parenchyma: Motion artifact limits evaluation of the lungs. Paraseptal emphysematous changes noted within the bilateral upper lobes. Mild " hypoventilatory changes within the bilateral lung bases. Mild bilateral lower lobe infiltrates cannot be   excluded.    Trachea and airways: The trachea and central airways appear unremarkable.    Pleural space: No significant pleural effusion or pneumothorax.    Heart and pericardium: Coronary artery calcifications are present. Otherwise, the heart and pericardium appear unremarkable.    Chest wall: No acute or suspicious osseous or soft tissue lesion is identified.    Upper abdomen: No acute abnormality is identified within the visualized upper abdomen.      Impression:      1.Examination is limited due to motion artifact.  2.Mild bilateral lower lobe infiltrates cannot be excluded.  3.Paraseptal emphysematous changes within the bilateral upper lobes.  4.Additional findings as detailed above.        Electronically Signed: Dom Noel MD    11/17/2024 8:18 PM EST    Workstation ID: [id number]    XR Chest 1 View [[id number]] Collected: 11/16/24 1220     Updated: 11/16/24 1223    Narrative:      XR CHEST 1 VW    Date of Exam: 11/16/2024 12:07 PM EST    Indication: cough    Comparison: Chest x-ray dated 6/17/2020    Findings:  Cardiac silhouette is enlarged. Vague bilateral lung opacities may represent edema or mild infiltrates. No significant pleural effusion or pneumothorax. No acute osseous lesion is seen. There are senescent changes of the aorta and skeletal structures.      Impression:      Impression:  1.Findings compatible with mild CHF. Concomitant mild infiltrates cannot be excluded.      Electronically Signed: Dom Noel MD    11/16/2024 12:21 PM EST    Workstation ID: [id number]            ECHOCARDIOGRAM:    Results for orders placed during the hospital encounter of 11/16/24    Adult Transthoracic Echo Complete W/ Cont if Necessary Per Protocol    Interpretation Summary    Left ventricular systolic function is normal. Calculated left ventricular EF = 64.3%    Left ventricular wall thickness is  consistent with mild to moderate concentric hypertrophy.    Left ventricular diastolic function is consistent with (grade I) impaired relaxation.    The left atrial cavity is mildly dilated.    There is mild calcification of the aortic valve.    Estimated right ventricular systolic pressure from tricuspid regurgitation is markedly elevated (>55 mmHg).    Transthoracic echocardiography with EF of 64%.  Mild MR and no effusion    Electronically signed by Faustino García MD, 11/17/24, 2:35 PM EST.        I reviewed the patient's new clinical results.    EKG:      Assessment:       CHF (congestive heart failure)    1) acute on chronic diastolic heart failure  - CT chest w/o showed vascular congestion  -TSH WNL  - 2D echo 11/2024 shows an EF of 64% with grade 1 diastolic dysfunction and mild MR.  - s/p IV lasix    2) Acute respiratory failure / parainfluenza virus  - pulmonary following    3) HTN    4) UTI      Plan:   Patient appears relatively compensated with respect to volume.  Tele shows SR with frequent PACs.  Check Mg.  Continue on BB.  BP soft.  Will put BP parameters on amlodipine.        Electronically signed by BEBO Hess, 11/18/24, 4:06 PM EST.

## 2024-11-18 NOTE — PROGRESS NOTES
Daily Progress Note        CHF (congestive heart failure)    Assessment:    Acute hypoxic respiratory insufficiency    Elevated troponin and proBNP  Iron deficiency    Abnormal UA, urine culture pending    Viral panel positive for parainfluenza  MRSA DNA probe positive     Recommendations:     Oxygen titration currently on 5 L nasal cannula  Low-dose steroids IV Solu-Medrol 20 mg daily  Empiric antibiotic Rocephin  Check urine cultures  DVT prophylaxis Lovenox 40 mg subcu daily  Protonix 40 mg daily                     LOS: 2 days     Subjective         Objective     Vital signs for last 24 hours:  Vitals:    11/18/24 0405 11/18/24 0500 11/18/24 0605 11/18/24 0721   BP: 109/52 111/60 133/64 117/72   BP Location: Right arm   Right arm   Patient Position: Lying   Lying   Pulse: 84 90 97 97   Resp: 26   27   Temp: 99.5 °F (37.5 °C)   100.4 °F (38 °C)   TempSrc: Oral   Axillary   SpO2: 95% 99% 98% 95%   Weight: 78.2 kg (172 lb 6.4 oz)      Height:           Intake/Output last 3 shifts:  I/O last 3 completed shifts:  In: 955 [P.O.:955]  Out: 1950 [Urine:1950]  Intake/Output this shift:  No intake/output data recorded.      Radiology  Imaging Results (Last 24 Hours)       Procedure Component Value Units Date/Time    CT Chest Without Contrast Diagnostic [[id number]] Collected: 11/17/24 2013     Updated: 11/17/24 2020    Narrative:      CT CHEST WO CONTRAST DIAGNOSTIC    Date of Exam: 11/17/2024 8:00 PM EST    Indication: pulm edema vs infiltrate.    Comparison: Chest CT dated 8/18/2021    Technique: Axial CT images were obtained of the chest without contrast administration.  Sagittal and coronal reconstructions were performed.  Automated exposure control and iterative reconstruction methods were used.      FINDINGS:    Thoracic inlet: Unremarkable.    Great vessels: Atherosclerotic plaque is seen within the thoracic aorta and proximal arch vessels.    Mediastinum/Anna: Scattered subcentimeter mediastinal lymph nodes are  noted, nonspecific. The esophagus appears unremarkable.    Lung parenchyma: Motion artifact limits evaluation of the lungs. Paraseptal emphysematous changes noted within the bilateral upper lobes. Mild hypoventilatory changes within the bilateral lung bases. Mild bilateral lower lobe infiltrates cannot be   excluded.    Trachea and airways: The trachea and central airways appear unremarkable.    Pleural space: No significant pleural effusion or pneumothorax.    Heart and pericardium: Coronary artery calcifications are present. Otherwise, the heart and pericardium appear unremarkable.    Chest wall: No acute or suspicious osseous or soft tissue lesion is identified.    Upper abdomen: No acute abnormality is identified within the visualized upper abdomen.      Impression:      1.Examination is limited due to motion artifact.  2.Mild bilateral lower lobe infiltrates cannot be excluded.  3.Paraseptal emphysematous changes within the bilateral upper lobes.  4.Additional findings as detailed above.        Electronically Signed: Dom Noel MD    11/17/2024 8:18 PM EST    Workstation ID: [id number]            Labs:  Results from last 7 days   Lab Units 11/18/24  0456   WBC 10*3/mm3 15.43*   HEMOGLOBIN g/dL 12.1*   HEMATOCRIT % 38.7   PLATELETS 10*3/mm3 163     Results from last 7 days   Lab Units 11/18/24  0456 11/16/24  1815 11/16/24  1238   SODIUM mmol/L 138   < > 136   POTASSIUM mmol/L 4.2   < > 4.4   CHLORIDE mmol/L 100   < > 101   CO2 mmol/L 29.3*   < > 28.4   BUN mg/dL 29*   < > 29*   CREATININE mg/dL 1.08   < > 0.94   CALCIUM mg/dL 8.9   < > 8.9   BILIRUBIN mg/dL  --   --  0.2   ALK PHOS U/L  --   --  116   ALT (SGPT) U/L  --   --  21   AST (SGOT) U/L  --   --  18   GLUCOSE mg/dL 105*   < > 108*    < > = values in this interval not displayed.         Results from last 7 days   Lab Units 11/16/24  1238   ALBUMIN g/dL 3.3*     Results from last 7 days   Lab Units 11/16/24  1409 11/16/24  1238   HSTROP T ng/L 54* 56*          Results from last 7 days   Lab Units 11/16/24  1815   MAGNESIUM mg/dL 1.9         Results from last 7 days   Lab Units 11/16/24  1815   TSH uIU/mL 0.992           Meds:   SCHEDULE  amLODIPine, 5 mg, Oral, Daily  cefTRIAXone, 1,000 mg, Intravenous, Q24H  cloNIDine, 0.1 mg, Oral, BID  enoxaparin, 40 mg, Subcutaneous, Q24H  methylPREDNISolone sodium succinate, 20 mg, Intravenous, Daily  metoprolol succinate XL, 12.5 mg, Oral, Q24H  primidone, 250 mg, Oral, TID  sodium chloride, 10 mL, Intravenous, Q12H      Infusions  Pharmacy to Dose enoxaparin (LOVENOX),       PRNs    acetaminophen **OR** acetaminophen **OR** acetaminophen    senna-docusate sodium **AND** polyethylene glycol **AND** bisacodyl **AND** bisacodyl    Calcium Replacement - Follow Nurse / BPA Driven Protocol    hydrALAZINE    Magnesium Standard Dose Replacement - Follow Nurse / BPA Driven Protocol    ondansetron ODT **OR** ondansetron    Pharmacy to Dose enoxaparin (LOVENOX)    Phosphorus Replacement - Follow Nurse / BPA Driven Protocol    Potassium Replacement - Follow Nurse / BPA Driven Protocol    [COMPLETED] Insert peripheral IV **AND** sodium chloride    sodium chloride    sodium chloride    Physical Exam:  Physical Exam  Cardiovascular:      Heart sounds: Murmur heard.      No gallop.   Pulmonary:      Effort: No respiratory distress.      Breath sounds: No stridor. Rhonchi and rales present. No wheezing.   Chest:      Chest wall: No tenderness.         ROS  Review of Systems   Respiratory:  Positive for cough and shortness of breath. Negative for wheezing and stridor.    Cardiovascular:  Positive for palpitations. Negative for chest pain and leg swelling.             Total time spent with patient greater than: 45 Minutes

## 2024-11-18 NOTE — PLAN OF CARE
Assessment: Sathya Flores is a 89 y/o M being monitored for (+) parainfluenza, acute hypoxic resp insufficiency who presents with functional mobility impairments which indicate the need for skilled intervention. Pt presents with resting lip tremor, R hand tremor worse than L, BLE rigidity, bradykinesia with bed mobility and transfers, and poor postural awareness. Pt requires mod-maxAx2 throughout for bed mobility, STS, standing balance, and squat-pivot to recliner. Pt's tremor impacting his ability to perform ADLs and functional mobility safely. Pt's symptom presentation with concern for PD. Pt is far below baseline function and would benefit from SNF. Tolerating session today without incident. Will continue to follow and progress as tolerated.

## 2024-11-18 NOTE — PLAN OF CARE
Goal Outcome Evaluation:      Pt assessment done, pt has low grade fever and elevating WBC, Dr. Mccarty made aware. Tylenol given, no other concerns noted, will continue to monitor

## 2024-11-19 ENCOUNTER — APPOINTMENT (OUTPATIENT)
Dept: GENERAL RADIOLOGY | Facility: HOSPITAL | Age: 89
End: 2024-11-19
Payer: MEDICARE

## 2024-11-19 LAB
ANION GAP SERPL CALCULATED.3IONS-SCNC: 9.6 MMOL/L (ref 5–15)
BACTERIA SPEC AEROBE CULT: ABNORMAL
BASOPHILS # BLD AUTO: 0.02 10*3/MM3 (ref 0–0.2)
BASOPHILS NFR BLD AUTO: 0.2 % (ref 0–1.5)
BUN SERPL-MCNC: 32 MG/DL (ref 8–23)
BUN/CREAT SERPL: 36.4 (ref 7–25)
CALCIUM SPEC-SCNC: 8.6 MG/DL (ref 8.2–9.6)
CHLORIDE SERPL-SCNC: 101 MMOL/L (ref 98–107)
CO2 SERPL-SCNC: 26.4 MMOL/L (ref 22–29)
CREAT SERPL-MCNC: 0.88 MG/DL (ref 0.76–1.27)
DEPRECATED RDW RBC AUTO: 51.7 FL (ref 37–54)
EGFRCR SERPLBLD CKD-EPI 2021: 79.7 ML/MIN/1.73
EOSINOPHIL # BLD AUTO: 0.02 10*3/MM3 (ref 0–0.4)
EOSINOPHIL NFR BLD AUTO: 0.2 % (ref 0.3–6.2)
ERYTHROCYTE [DISTWIDTH] IN BLOOD BY AUTOMATED COUNT: 14.4 % (ref 12.3–15.4)
GLUCOSE SERPL-MCNC: 104 MG/DL (ref 65–99)
HCT VFR BLD AUTO: 34.8 % (ref 37.5–51)
HGB BLD-MCNC: 11.4 G/DL (ref 13–17.7)
IMM GRANULOCYTES # BLD AUTO: 0.06 10*3/MM3 (ref 0–0.05)
IMM GRANULOCYTES NFR BLD AUTO: 0.5 % (ref 0–0.5)
LYMPHOCYTES # BLD AUTO: 1.9 10*3/MM3 (ref 0.7–3.1)
LYMPHOCYTES NFR BLD AUTO: 14.5 % (ref 19.6–45.3)
MAGNESIUM SERPL-MCNC: 2 MG/DL (ref 1.7–2.3)
MCH RBC QN AUTO: 31.9 PG (ref 26.6–33)
MCHC RBC AUTO-ENTMCNC: 32.8 G/DL (ref 31.5–35.7)
MCV RBC AUTO: 97.5 FL (ref 79–97)
MONOCYTES # BLD AUTO: 2.24 10*3/MM3 (ref 0.1–0.9)
MONOCYTES NFR BLD AUTO: 17.1 % (ref 5–12)
NEUTROPHILS NFR BLD AUTO: 67.5 % (ref 42.7–76)
NEUTROPHILS NFR BLD AUTO: 8.88 10*3/MM3 (ref 1.7–7)
NRBC BLD AUTO-RTO: 0 /100 WBC (ref 0–0.2)
PLATELET # BLD AUTO: 149 10*3/MM3 (ref 140–450)
PMV BLD AUTO: 10.7 FL (ref 6–12)
POTASSIUM SERPL-SCNC: 4.4 MMOL/L (ref 3.5–5.2)
RBC # BLD AUTO: 3.57 10*6/MM3 (ref 4.14–5.8)
SODIUM SERPL-SCNC: 137 MMOL/L (ref 136–145)
WBC NRBC COR # BLD AUTO: 13.12 10*3/MM3 (ref 3.4–10.8)

## 2024-11-19 PROCEDURE — 97112 NEUROMUSCULAR REEDUCATION: CPT

## 2024-11-19 PROCEDURE — 99232 SBSQ HOSP IP/OBS MODERATE 35: CPT | Performed by: NURSE PRACTITIONER

## 2024-11-19 PROCEDURE — 80048 BASIC METABOLIC PNL TOTAL CA: CPT | Performed by: INTERNAL MEDICINE

## 2024-11-19 PROCEDURE — 25010000002 AMPICILLIN-SULBACTAM PER 1.5 G: Performed by: INTERNAL MEDICINE

## 2024-11-19 PROCEDURE — 71045 X-RAY EXAM CHEST 1 VIEW: CPT

## 2024-11-19 PROCEDURE — 92610 EVALUATE SWALLOWING FUNCTION: CPT

## 2024-11-19 PROCEDURE — 85025 COMPLETE CBC W/AUTO DIFF WBC: CPT | Performed by: INTERNAL MEDICINE

## 2024-11-19 PROCEDURE — 83735 ASSAY OF MAGNESIUM: CPT | Performed by: NURSE PRACTITIONER

## 2024-11-19 PROCEDURE — 94640 AIRWAY INHALATION TREATMENT: CPT

## 2024-11-19 PROCEDURE — 25010000002 METHYLPREDNISOLONE PER 40 MG: Performed by: INTERNAL MEDICINE

## 2024-11-19 PROCEDURE — 94799 UNLISTED PULMONARY SVC/PX: CPT

## 2024-11-19 PROCEDURE — 97110 THERAPEUTIC EXERCISES: CPT

## 2024-11-19 PROCEDURE — 25010000002 ENOXAPARIN PER 10 MG: Performed by: FAMILY MEDICINE

## 2024-11-19 PROCEDURE — 97530 THERAPEUTIC ACTIVITIES: CPT

## 2024-11-19 RX ORDER — BRIMONIDINE TARTRATE 2 MG/ML
1 SOLUTION/ DROPS OPHTHALMIC EVERY 12 HOURS
Status: DISCONTINUED | OUTPATIENT
Start: 2024-11-19 | End: 2024-11-22 | Stop reason: HOSPADM

## 2024-11-19 RX ORDER — GUAIFENESIN 600 MG/1
1200 TABLET, EXTENDED RELEASE ORAL EVERY 12 HOURS SCHEDULED
Status: DISCONTINUED | OUTPATIENT
Start: 2024-11-19 | End: 2024-11-22 | Stop reason: HOSPADM

## 2024-11-19 RX ORDER — TIMOLOL MALEATE 5 MG/ML
1 SOLUTION/ DROPS OPHTHALMIC EVERY 12 HOURS
Status: DISCONTINUED | OUTPATIENT
Start: 2024-11-19 | End: 2024-11-22 | Stop reason: HOSPADM

## 2024-11-19 RX ORDER — IPRATROPIUM BROMIDE AND ALBUTEROL SULFATE 2.5; .5 MG/3ML; MG/3ML
3 SOLUTION RESPIRATORY (INHALATION)
Status: DISCONTINUED | OUTPATIENT
Start: 2024-11-19 | End: 2024-11-22 | Stop reason: HOSPADM

## 2024-11-19 RX ADMIN — ENOXAPARIN SODIUM 40 MG: 100 INJECTION SUBCUTANEOUS at 17:20

## 2024-11-19 RX ADMIN — BRIMONIDINE TARTRATE 1 DROP: 2 SOLUTION/ DROPS OPHTHALMIC at 20:20

## 2024-11-19 RX ADMIN — PRIMIDONE 250 MG: 250 TABLET ORAL at 08:13

## 2024-11-19 RX ADMIN — GUAIFENESIN 1200 MG: 600 TABLET, EXTENDED RELEASE ORAL at 12:05

## 2024-11-19 RX ADMIN — BRIMONIDINE TARTRATE 1 DROP: 2 SOLUTION/ DROPS OPHTHALMIC at 08:14

## 2024-11-19 RX ADMIN — AMPICILLIN SODIUM AND SULBACTAM SODIUM 3 G: 2; 1 INJECTION, POWDER, FOR SOLUTION INTRAMUSCULAR; INTRAVENOUS at 21:23

## 2024-11-19 RX ADMIN — METHYLPREDNISOLONE SODIUM SUCCINATE 20 MG: 40 INJECTION, POWDER, FOR SOLUTION INTRAMUSCULAR; INTRAVENOUS at 08:13

## 2024-11-19 RX ADMIN — PRIMIDONE 250 MG: 250 TABLET ORAL at 20:20

## 2024-11-19 RX ADMIN — METOPROLOL SUCCINATE 12.5 MG: 25 TABLET, EXTENDED RELEASE ORAL at 08:15

## 2024-11-19 RX ADMIN — AMPICILLIN SODIUM AND SULBACTAM SODIUM 3 G: 2; 1 INJECTION, POWDER, FOR SOLUTION INTRAMUSCULAR; INTRAVENOUS at 17:20

## 2024-11-19 RX ADMIN — CLONIDINE HYDROCHLORIDE 0.1 MG: 0.1 TABLET ORAL at 20:20

## 2024-11-19 RX ADMIN — TIMOLOL MALEATE 1 DROP: 5 SOLUTION/ DROPS OPHTHALMIC at 08:14

## 2024-11-19 RX ADMIN — Medication 10 ML: at 08:15

## 2024-11-19 RX ADMIN — ACETAMINOPHEN 650 MG: 650 SOLUTION ORAL at 05:56

## 2024-11-19 RX ADMIN — TIMOLOL MALEATE 1 DROP: 5 SOLUTION/ DROPS OPHTHALMIC at 20:20

## 2024-11-19 RX ADMIN — Medication 10 ML: at 20:20

## 2024-11-19 RX ADMIN — AMLODIPINE BESYLATE 5 MG: 5 TABLET ORAL at 08:14

## 2024-11-19 RX ADMIN — LATANOPROST 1 DROP: 50 SOLUTION/ DROPS OPHTHALMIC at 20:20

## 2024-11-19 RX ADMIN — GUAIFENESIN 1200 MG: 600 TABLET, EXTENDED RELEASE ORAL at 20:20

## 2024-11-19 RX ADMIN — AMPICILLIN SODIUM AND SULBACTAM SODIUM 3 G: 2; 1 INJECTION, POWDER, FOR SOLUTION INTRAMUSCULAR; INTRAVENOUS at 12:04

## 2024-11-19 RX ADMIN — IPRATROPIUM BROMIDE AND ALBUTEROL SULFATE 3 ML: .5; 3 SOLUTION RESPIRATORY (INHALATION) at 19:30

## 2024-11-19 RX ADMIN — IPRATROPIUM BROMIDE AND ALBUTEROL SULFATE 3 ML: .5; 3 SOLUTION RESPIRATORY (INHALATION) at 14:20

## 2024-11-19 RX ADMIN — PANTOPRAZOLE SODIUM 40 MG: 40 TABLET, DELAYED RELEASE ORAL at 05:56

## 2024-11-19 NOTE — PROGRESS NOTES
LOS: 3 days   Patient Care Team:  Provider, No Known as PCP - Rogelio Khan MD as PCP - Family Medicine    Subjective     Interval History:No significant change; persistent low grade fever    Patient Complaints: Congested cough, generalized weakness    History taken from: patient    Review of Systems   Constitutional:  Positive for activity change, appetite change, fatigue and fever. Negative for chills and diaphoresis.   HENT:  Negative for facial swelling.    Eyes:  Negative for visual disturbance.   Respiratory:  Positive for cough, shortness of breath and wheezing.    Cardiovascular:  Negative for chest pain, palpitations and leg swelling.   Gastrointestinal:  Negative for abdominal pain, constipation, diarrhea, nausea and vomiting.   Endocrine: Negative for polyuria.   Genitourinary:  Negative for dysuria.   Musculoskeletal:  Positive for gait problem. Negative for arthralgias.   Neurological:  Negative for dizziness and light-headedness.   Psychiatric/Behavioral:  Negative for confusion.            Objective     Vital Signs  Temp:  [98 °F (36.7 °C)-100 °F (37.8 °C)] 98 °F (36.7 °C)  Heart Rate:  [] 96  Resp:  [20-34] 26  BP: ()/(52-71) 123/54    Physical Exam:     General Appearance:    Alert, answers questions appropriately, frail   Head:    Normocephalic, without obvious abnormality, atraumatic   Eyes:            Lids and lashes normal, conjunctivae and sclerae normal, no   icterus, no pallor, corneas clear, PERRLA   Ears:    Ears appear intact with no abnormalities noted   Throat:   No oral lesions, no thrush, oral mucosa moist   Neck:   No adenopathy, supple, trachea midline, no thyromegaly, no   carotid bruit, no JVD   Lungs:     Diffuse rhonchi, scattered wheezing    Heart:    Regular rhythm and normal rate, normal S1 and S2, no            murmur, no gallop, no rub, no click   Chest Wall:    No abnormalities observed   Abdomen:     Normal bowel sounds, no masses, no  organomegaly, soft        Non-tender non-distended, no guarding,   Extremities:   Moves all extremities well, no edema, no cyanosis, no             Redness   Pulses:   Pulses palpable and equal bilaterally   Skin:   Scabs on chest wall, nose   Lymph nodes:   No palpable adenopathy   Neurologic:   Cranial nerves 2 - 12 grossly intact, sensation intact, DTR       present and equal bilaterally        Results Review:    Lab Results (last 24 hours)       Procedure Component Value Units Date/Time    Magnesium [[id number]]  (Normal) Collected: 11/19/24 0446    Specimen: Blood Updated: 11/19/24 0537     Magnesium 2.0 mg/dL     Basic Metabolic Panel [[id number]]  (Abnormal) Collected: 11/19/24 0446    Specimen: Blood Updated: 11/19/24 0537     Glucose 104 mg/dL      BUN 32 mg/dL      Creatinine 0.88 mg/dL      Sodium 137 mmol/L      Potassium 4.4 mmol/L      Comment: Specimen hemolyzed.  Result may be falsely elevated.        Chloride 101 mmol/L      CO2 26.4 mmol/L      Calcium 8.6 mg/dL      BUN/Creatinine Ratio 36.4     Anion Gap 9.6 mmol/L      eGFR 79.7 mL/min/1.73     Narrative:      GFR Normal >60  Chronic Kidney Disease <60  Kidney Failure <15    The GFR formula is only valid for adults with stable renal function between ages 18 and 70.    CBC & Differential [[id number]]  (Abnormal) Collected: 11/19/24 0446    Specimen: Blood Updated: 11/19/24 0503    Narrative:      The following orders were created for panel order CBC & Differential.  Procedure                               Abnormality         Status                     ---------                               -----------         ------                     CBC Auto Differential[[id number]]        Abnormal            Final result                 Please view results for these tests on the individual orders.    CBC Auto Differential [[id number]]  (Abnormal) Collected: 11/19/24 0446    Specimen: Blood Updated: 11/19/24 0503     WBC 13.12 10*3/mm3      RBC 3.57 10*6/mm3       Hemoglobin 11.4 g/dL      Hematocrit 34.8 %      MCV 97.5 fL      MCH 31.9 pg      MCHC 32.8 g/dL      RDW 14.4 %      RDW-SD 51.7 fl      MPV 10.7 fL      Platelets 149 10*3/mm3      Neutrophil % 67.5 %      Lymphocyte % 14.5 %      Monocyte % 17.1 %      Eosinophil % 0.2 %      Basophil % 0.2 %      Immature Grans % 0.5 %      Neutrophils, Absolute 8.88 10*3/mm3      Lymphocytes, Absolute 1.90 10*3/mm3      Monocytes, Absolute 2.24 10*3/mm3      Eosinophils, Absolute 0.02 10*3/mm3      Basophils, Absolute 0.02 10*3/mm3      Immature Grans, Absolute 0.06 10*3/mm3      nRBC 0.0 /100 WBC     Urine Culture - Urine, Urine, Clean Catch [[id number]]  (Abnormal) Collected: 11/17/24 1213    Specimen: Urine, Clean Catch Updated: 11/18/24 1324     Urine Culture >100,000 CFU/mL Gram Negative Bacilli    Narrative:      Colonization of the urinary tract without infection is common. Treatment is discouraged unless the patient is symptomatic, pregnant, or undergoing an invasive urologic procedure.             Imaging Results (Last 24 Hours)       ** No results found for the last 24 hours. **                 I reviewed the patient's new clinical results.    Medication Review:   Scheduled Meds:amLODIPine, 5 mg, Oral, Daily  ampicillin-sulbactam, 3 g, Intravenous, Q6H  brimonidine, 1 drop, Right Eye, Q12H   And  timolol, 1 drop, Right Eye, Q12H  cloNIDine, 0.1 mg, Oral, BID  enoxaparin, 40 mg, Subcutaneous, Q24H  ipratropium-albuterol, 3 mL, Nebulization, TID - RT  latanoprost, 1 drop, Right Eye, Nightly  methylPREDNISolone sodium succinate, 20 mg, Intravenous, Daily  metoprolol succinate XL, 12.5 mg, Oral, Q24H  pantoprazole, 40 mg, Oral, Q AM  primidone, 250 mg, Oral, TID  sodium chloride, 10 mL, Intravenous, Q12H      Continuous Infusions:Pharmacy to Dose enoxaparin (LOVENOX),       PRN Meds:.  acetaminophen **OR** acetaminophen **OR** acetaminophen    senna-docusate sodium **AND** polyethylene glycol **AND** bisacodyl **AND**  bisacodyl    Calcium Replacement - Follow Nurse / BPA Driven Protocol    hydrALAZINE    Magnesium Standard Dose Replacement - Follow Nurse / BPA Driven Protocol    ondansetron ODT **OR** ondansetron    Pharmacy to Dose enoxaparin (LOVENOX)    Phosphorus Replacement - Follow Nurse / BPA Driven Protocol    Potassium Replacement - Follow Nurse / BPA Driven Protocol    [COMPLETED] Insert peripheral IV **AND** sodium chloride    sodium chloride    sodium chloride     Assessment & Plan       CHF (congestive heart failure)  - appears euvolemic and has elevated BUN/Cr ratio, no further diuresis  Parainfluenza virus -  steroids, Duonebs, Mucinex, supplemental oxygen  UTI (?) - awaiting urine culture;  Pneumonia - likelly developing secondary bacterial pneumonia, concern for aspiration.  Repeat chest xray, changing antibiotics to Unasyn.  ST eval  Tremor - primidone  HTN- metoprolol, amlodipine, clonidine  Glaucoma - home eye drops    Lovenox for dvt prophy    Plan for disposition:CASPER Mccarty MD  11/19/24  09:31 EST

## 2024-11-19 NOTE — PLAN OF CARE
Goal Outcome Evaluation:         Patient stable on 3 liters oxygen. He has been up in the chair for some time today. Instructed to deep breathe and cough. He is currently NPO until video swallow is complete in the morning per speec  Problem: Adult Inpatient Plan of Care  Goal: Absence of Hospital-Acquired Illness or Injury  Intervention: Identify and Manage Fall Risk  Description: Perform standard risk assessment on admission using a validated tool or comprehensive approach appropriate to the patient; reassess fall risk frequently, with change in status or transfer to another level of care.  Communicate risk to interprofessional healthcare team; ensure fall risk visible cue.  Determine need for increased observation, equipment and environmental modification, as well as use of supportive, nonskid footwear.  Adjust safety measures to individual needs and identified risk factors.  Reinforce the importance of active participation with fall risk prevention, safety, and physical activity with the patient and family.  Perform regular intentional rounding to assess need for position change, pain assessment and personal needs, including assistance with toileting.  Recent Flowsheet Documentation  Taken 11/19/2024 1800 by Yenny Plummer RN  Safety Promotion/Fall Prevention:   activity supervised   assistive device/personal items within reach   fall prevention program maintained   gait belt   room organization consistent   safety round/check completed  Taken 11/19/2024 1700 by Yenny Plummer RN  Safety Promotion/Fall Prevention:   activity supervised   assistive device/personal items within reach   safety round/check completed  Taken 11/19/2024 1600 by Yenny Plummer RN  Safety Promotion/Fall Prevention:   activity supervised   assistive device/personal items within reach   fall prevention program maintained   gait belt   nonskid shoes/slippers when out of bed   room organization consistent   safety round/check completed  Taken  11/19/2024 1500 by Yenny Plummer RN  Safety Promotion/Fall Prevention:   activity supervised   assistive device/personal items within reach   fall prevention program maintained   gait belt   safety round/check completed  Taken 11/19/2024 1400 by Yenny Plummer RN  Safety Promotion/Fall Prevention:   activity supervised   assistive device/personal items within reach   clutter free environment maintained   fall prevention program maintained   gait belt   nonskid shoes/slippers when out of bed   room organization consistent   safety round/check completed  Taken 11/19/2024 1300 by Yenny Plummer RN  Safety Promotion/Fall Prevention:   activity supervised   safety round/check completed  Taken 11/19/2024 1200 by Yenny Plummer RN  Safety Promotion/Fall Prevention:   activity supervised   assistive device/personal items within reach   clutter free environment maintained   fall prevention program maintained   gait belt   room organization consistent   safety round/check completed  Taken 11/19/2024 1100 by Yenny Plummer RN  Safety Promotion/Fall Prevention:   activity supervised   assistive device/personal items within reach   safety round/check completed  Taken 11/19/2024 1000 by Yenny Plummer RN  Safety Promotion/Fall Prevention:   activity supervised   assistive device/personal items within reach   fall prevention program maintained   room organization consistent   safety round/check completed  Taken 11/19/2024 0900 by Yenny Plummer RN  Safety Promotion/Fall Prevention:   activity supervised   assistive device/personal items within reach   safety round/check completed  Taken 11/19/2024 0800 by Yenny Plummer RN  Safety Promotion/Fall Prevention:   activity supervised   assistive device/personal items within reach   fall prevention program maintained   gait belt   nonskid shoes/slippers when out of bed   room organization consistent   safety round/check completed  Intervention: Prevent Skin Injury  Description: Perform  a screening for skin injury risk, such as pressure or moisture-associated skin damage on admission and at regular intervals throughout hospital stay.  Keep all areas of skin (especially folds) clean and dry.  Maintain adequate skin hydration.  Relieve and redistribute pressure and protect bony prominences and skin at risk for injury; implement measures based on patient-specific risk factors.  Match turning and repositioning schedule to clinical condition.  Encourage weight shift frequently; assist with reposition if unable to complete independently.  Float heels off bed; avoid pressure on the Achilles tendon.  Keep skin free from extended contact with medical devices.  Optimize nutrition and hydration.  Encourage functional activity and mobility, as early as tolerated.  Use aids (e.g., slide boards, mechanical lift) during transfer.  Recent Flowsheet Documentation  Taken 11/19/2024 1800 by Yenny Plummer RN  Body Position:   tilted   weight shifting  Skin Protection:   incontinence pads utilized   transparent dressing maintained  Taken 11/19/2024 1600 by Yenny Plummer RN  Body Position:   weight shifting   tilted  Skin Protection:   incontinence pads utilized   transparent dressing maintained   silicone foam dressing in place  Taken 11/19/2024 1400 by Yenny Plummer RN  Body Position:   turned   tilted   weight shifting  Taken 11/19/2024 1200 by Yenny Plummer RN  Body Position: weight shifting  Skin Protection:   silicone foam dressing in place   transparent dressing maintained  Taken 11/19/2024 1000 by Yenny Plummer RN  Body Position:   turned   tilted   weight shifting  Skin Protection:   incontinence pads utilized   silicone foam dressing in place   transparent dressing maintained  Taken 11/19/2024 0800 by Yenny Plummer RN  Body Position:   turned   tilted   weight shifting  Skin Protection:   incontinence pads utilized   silicone foam dressing in place   transparent dressing maintained  Intervention: Prevent  and Manage VTE (Venous Thromboembolism) Risk  Description: Assess for VTE (venous thromboembolism) risk.  Promote early mobilization; encourage both active and passive leg exercises, if unable to ambulate.  Initiate and maintain compression or other therapy, as indicated, based on identified risk in accordance with organizational protocol and provider order.  Recognize the patient's individual risk for bleeding before initiating pharmacologic thromboprophylaxis.  Recent Flowsheet Documentation  Taken 11/19/2024 1200 by Yenny Plummer RN  VTE Prevention/Management: (lovenox)   bilateral   SCDs (sequential compression devices) off  Taken 11/19/2024 0800 by Yenny Plummer RN  VTE Prevention/Management: (lovenox)   bilateral   SCDs (sequential compression devices) off  Goal: Optimal Comfort and Wellbeing  Intervention: Provide Person-Centered Care  Description: Use a family-focused approach to care; encourage support system presence and participation.  Develop trust and rapport by proactively providing information, encouraging questions, addressing concerns and offering reassurance.  Acknowledge emotional response to hospitalization.  Recognize and utilize personal coping strategies and strengths; develop goals via shared decision-making.  Honor spiritual and cultural preferences.  Recent Flowsheet Documentation  Taken 11/19/2024 0800 by Yenny Plummer RN  Trust Relationship/Rapport:   care explained   choices provided   reassurance provided   thoughts/feelings acknowledged   h therapy. Plan of care to continue.

## 2024-11-19 NOTE — THERAPY TREATMENT NOTE
"Subjective: Pt agreeable to therapeutic plan of care.    Objective:     Precautions - falls    Bed mobility - Mod-A and Assist x 2; pt able to initiate bringing BLE off EOB, strength and reduced motor planning limiting executing rolling or coming from sidelying>sit. Repositioning pad used.    Transfers - Mod-A and Assist x 2 to Min-A x 2. Pt requires verbal and tactile cues, momentum, to facilitate anterior weight shift for successful STS. Pt has difficulty with lateral weight-shifting and ability to clear foot during stand-pivot.    Balance - Min-A x 2 for standing static and dynamic balance. Improved postural control with less observed posterior lean.    Ambulation - minimal shuffled steps during stand-pivot transfer    Therapeutic Exercise - 10 Reps B LE AROM lying supine and unsupported sitting / EOB: ankle pumps, knee to chest, SLR, LAQ, sit to stands    Vitals: WNL    Pain: 0 VAS   Location: N/A  Intervention for pain: N/A    Education: Provided education on the importance of mobility in the acute care setting, Verbal/Tactile Cues, Transfer Training, and Energy conservation strategies    Assessment: Sathya Flores presents with functional mobility impairments which indicate the need for skilled intervention. Pt demonstrates improvement in mobility this date, however continuing to demonstrate posterior lean, bradykinesia, and poor weight-shifting with transfers. Pt able to tolerate and is more attentive during session, reporting he feels better. Continue to benefit from SNF due to deficits and Parkinsonian-like symptoms. Tolerating session today without incident. Will continue to follow and progress as tolerated.     Plan/Recommendations:   If medically appropriate, Moderate Intensity Therapy recommended post-acute care. This is recommended as therapy feels the patient would require 3-4 days per week and wouldn't tolerate \"3 hour daily\" rehab intensity. SNF would be the preferred choice. If the patient does not " agree to SNF, arrange HH or OP depending on home bound status. If patient is medically complex, consider LTACH. Pt requires no DME at discharge.     Pt desires Skilled Rehab placement at discharge. Pt cooperative; agreeable to therapeutic recommendations and plan of care.         Basic Mobility 6-click:  Rollin = Total, A lot = 2, A little = 3; 4 = None  Supine>Sit:   1 = Total, A lot = 2, A little = 3; 4 = None   Sit>Stand with arms:  1 = Total, A lot = 2, A little = 3; 4 = None  Bed>Chair:   1 = Total, A lot = 2, A little = 3; 4 = None  Ambulate in room:  1 = Total, A lot = 2, A little = 3; 4 = None  3-5 Steps with railin = Total, A lot = 2, A little = 3; 4 = None  Score: 10    Modified Irina: N/A = No pre-op stroke/TIA    Post-Tx Position: Up in Chair, Alarms activated, and Call light and personal items within reach  PPE: gloves, surgical mask, and gown    Therapy Charges for Today       Code Description Service Date Service Provider Modifiers Qty    42191314672  PT THERAPEUTIC ACT EA 15 MIN 2024 Diane Miranda, PT GP 1    [id number]  PT THER PROC EA 15 MIN 2024 Diane Miranda, PT GP 1    [id number]  PT NEUROMUSC RE EDUCATION EA 15 MIN 2024 Diane Miranda, PT GP 1    [id number]  PT THERAPEUTIC ACT EA 15 MIN 2024 Diane Miranda, PT GP 1    [id number]  PT THER PROC EA 15 MIN 2024 Diane Miranda, PT GP 1    [id number]  PT NEUROMUSC RE EDUCATION EA 15 MIN 2024 Diane Miranda, PT GP 1           PT Charges       Row Name 24 1318             Time Calculation    Start Time 1109  -OD      Stop Time 1137  -OD      Time Calculation (min) 28 min  -OD      PT Received On 24  -OD      PT - Next Appointment 24  -OD         Time Calculation- PT    Total Timed Code Minutes- PT 28 minute(s)  -OD                User Key  (r) = Recorded By, (t) = Taken By, (c) = Cosigned By      Initials Name Provider Type    OD Diane Miranda, PT Physical  Therapist

## 2024-11-19 NOTE — DISCHARGE PLACEMENT REQUEST
"Morgan Miranda (89 y.o. Male)       Date of Birth   05/07/1930    Social Security Number       Address   [address] [place] [address] [place] [facility]    Home Phone   [phone]    MRN   [id number]       Christianity   Unknown    Marital Status                               Admission Date   11/16/24    Admission Type   Urgent    Admitting Provider   Nasreen De La Cruz MD    Attending Provider   Chacha Mccarty MD    Department, Room/Bed   [facility] 2F, 2210/1       Discharge Date       Discharge Disposition       Discharge Destination                                 Attending Provider: Chacha Mccarty MD    Allergies: No Known Allergies    Isolation: Contact Drop   Infection: None   Code Status: No CPR    Ht: 167.6 cm (66\")   Wt: 78.2 kg (172 lb 6.4 oz)    Admission Cmt: None   Principal Problem: CHF (congestive heart failure) [I50.9]                   Active Insurance as of 11/16/2024       Primary Coverage       Payor Plan Insurance Group Employer/Plan Group    [employer] MEDICARE REPLACEMENT AARP MED ADV HMO [id number]       Payor Plan Address Payor Plan Phone Number Payor Plan Fax Number Effective Dates    [address]   1/1/2024 - None Entered    [facility] [id number]         Subscriber Name Subscriber Birth Date Member ID       MORGAN MIRANDA 5/7/1930 [id number]                     Emergency Contacts        (Rel.) Home Phone Work Phone Mobile Phone    FILEDSNEENA (Daughter) [phone] -- --    PhoebeRogelio (Son) -- -- [phone]          "

## 2024-11-19 NOTE — CASE MANAGEMENT/SOCIAL WORK
Continued Stay Note  [facility]     Patient Name: Sathya Flores  MRN: [id number]  Today's Date: 11/19/2024    Admit Date: 11/16/2024    Plan: [facility] skilled (pending), PASRR approved, Precert required   Discharge Plan       Row Name 11/19/24 1306       Plan    Plan [facility] skilled (pending), PASRR approved, Precert required    Plan Comments CM met with patient and family at bedside to follow up on SNF choices. Dtr Russell is not present so CM called her and she chose referrals for rehab to be sent to 1. [facility] 2. [place] 3. [place]. Cm sent inbasket for [facility] and notified liaison Hilda.               Elza Esquivel RN      [facility]  Office: [phone]  Cell: [phone]  Fax # [phone]

## 2024-11-19 NOTE — PROGRESS NOTES
[facility] CARDIOLOGY  [facility]        LOS:  LOS: 3 days   Patient Name: Sathya Flores  Age/Sex: 89 y.o. male  : 1930  MRN: [id number]    Day of Service: 24   Length of Stay: 3  Encounter Provider: BEBO Hess  Place of Service: [facility] CARDIOLOGY  Patient Care Team:  Provider, No Known as PCP - Rogelio Khan MD as PCP - Family Medicine    Subjective:     Chief Complaint:  F/U HF    Subjective:   Patient sitting up in chair.  He appears more energetic today.  Reports breathing better but observed with cough.    Current Medications:   Scheduled Meds:amLODIPine, 5 mg, Oral, Daily  ampicillin-sulbactam, 3 g, Intravenous, Q6H  brimonidine, 1 drop, Right Eye, Q12H   And  timolol, 1 drop, Right Eye, Q12H  cloNIDine, 0.1 mg, Oral, BID  enoxaparin, 40 mg, Subcutaneous, Q24H  guaiFENesin, 1,200 mg, Oral, Q12H  ipratropium-albuterol, 3 mL, Nebulization, TID - RT  latanoprost, 1 drop, Right Eye, Nightly  methylPREDNISolone sodium succinate, 20 mg, Intravenous, Daily  metoprolol succinate XL, 12.5 mg, Oral, Q24H  pantoprazole, 40 mg, Oral, Q AM  primidone, 250 mg, Oral, TID  sodium chloride, 10 mL, Intravenous, Q12H      Continuous Infusions:Pharmacy to Dose enoxaparin (LOVENOX),         Allergies:  No Known Allergies    ROS    Objective:     Temp:  [98 °F (36.7 °C)-100 °F (37.8 °C)] 98.1 °F (36.7 °C)  Heart Rate:  [] 80  Resp:  [16-34] 25  BP: ()/(54-71) 123/54     Intake/Output Summary (Last 24 hours) at 2024 1452  Last data filed at 2024 0800  Gross per 24 hour   Intake 470 ml   Output 600 ml   Net -130 ml     Body mass index is 27.83 kg/m².      24  1144 24  1630 24  0405   Weight: 79.8 kg (176 lb) 79.7 kg (175 lb 11.3 oz) 78.2 kg (172 lb 6.4 oz)         Physical Exam:  Neuro:  CV:  Resp:  GI:  Ext:  Tele: AAOx3, no gross deficits  S1S2 RRR, no murmur  Nonlabored, coarse  "bilateral bases  BS+, abd soft  Pedal pulses palp, trace pitting BLE edema  SR with PACs and run AT                                                   Lab Review:   Results from last 7 days   Lab Units 11/19/24  0446 11/18/24  0456 11/16/24  1815 11/16/24  1238   SODIUM mmol/L 137 138   < > 136   POTASSIUM mmol/L 4.4 4.2   < > 4.4   CHLORIDE mmol/L 101 100   < > 101   CO2 mmol/L 26.4 29.3*   < > 28.4   BUN mg/dL 32* 29*   < > 29*   CREATININE mg/dL 0.88 1.08   < > 0.94   GLUCOSE mg/dL 104* 105*   < > 108*   CALCIUM mg/dL 8.6 8.9   < > 8.9   AST (SGOT) U/L  --   --   --  18   ALT (SGPT) U/L  --   --   --  21    < > = values in this interval not displayed.     Results from last 7 days   Lab Units 11/16/24  1409 11/16/24  1238   HSTROP T ng/L 54* 56*     Results from last 7 days   Lab Units 11/19/24  0446 11/18/24  0456   WBC 10*3/mm3 13.12* 15.43*   HEMOGLOBIN g/dL 11.4* 12.1*   HEMATOCRIT % 34.8* 38.7   PLATELETS 10*3/mm3 149 163         Results from last 7 days   Lab Units 11/19/24  0446 11/16/24  1815   MAGNESIUM mg/dL 2.0 1.9           Invalid input(s): \"LDLCALC\"  Results from last 7 days   Lab Units 11/16/24  1238   PROBNP pg/mL 2,608.0*     Results from last 7 days   Lab Units 11/16/24  1815   TSH uIU/mL 0.992       Recent Radiology:  Imaging Results (Most Recent)       Procedure Component Value Units Date/Time    XR Chest 1 View [[id number]] Collected: 11/19/24 0952     Updated: 11/19/24 0956    Narrative:      XR CHEST 1 VW    Date of Exam: 11/19/2024 9:46 AM EST    Indication: shortness of breath    Comparison: 11/16/2024 chest radiograph, 11/17/2024 chest CT    Findings:  Mild scattered patchy areas of airspace disease concerning for pneumonia. Underlying emphysema with chronic interstitial lung disease similar to prior studies. No pneumothorax. Severe bilateral glenohumeral osteoarthritis. Heart size top normal,   exaggerated by technique. Aortic arch atherosclerosis. No pneumothorax or pleural effusion.      " Impression:      Impression:  1. Mild scattered patchy areas of airspace disease concerning for pneumonia.  2. Underlying emphysema and chronic interstitial lung disease.        Electronically Signed: Landen Mock MD    11/19/2024 9:54 AM EST    Workstation ID: [id number]    CT Chest Without Contrast Diagnostic [[id number]] Collected: 11/17/24 2013     Updated: 11/17/24 2020    Narrative:      CT CHEST WO CONTRAST DIAGNOSTIC    Date of Exam: 11/17/2024 8:00 PM EST    Indication: pulm edema vs infiltrate.    Comparison: Chest CT dated 8/18/2021    Technique: Axial CT images were obtained of the chest without contrast administration.  Sagittal and coronal reconstructions were performed.  Automated exposure control and iterative reconstruction methods were used.      FINDINGS:    Thoracic inlet: Unremarkable.    Great vessels: Atherosclerotic plaque is seen within the thoracic aorta and proximal arch vessels.    Mediastinum/Anna: Scattered subcentimeter mediastinal lymph nodes are noted, nonspecific. The esophagus appears unremarkable.    Lung parenchyma: Motion artifact limits evaluation of the lungs. Paraseptal emphysematous changes noted within the bilateral upper lobes. Mild hypoventilatory changes within the bilateral lung bases. Mild bilateral lower lobe infiltrates cannot be   excluded.    Trachea and airways: The trachea and central airways appear unremarkable.    Pleural space: No significant pleural effusion or pneumothorax.    Heart and pericardium: Coronary artery calcifications are present. Otherwise, the heart and pericardium appear unremarkable.    Chest wall: No acute or suspicious osseous or soft tissue lesion is identified.    Upper abdomen: No acute abnormality is identified within the visualized upper abdomen.      Impression:      1.Examination is limited due to motion artifact.  2.Mild bilateral lower lobe infiltrates cannot be excluded.  3.Paraseptal emphysematous changes within the bilateral upper  lobes.  4.Additional findings as detailed above.        Electronically Signed: Dom Noel MD    11/17/2024 8:18 PM EST    Workstation ID: [id number]    XR Chest 1 View [[id number]] Collected: 11/16/24 1220     Updated: 11/16/24 1223    Narrative:      XR CHEST 1 VW    Date of Exam: 11/16/2024 12:07 PM EST    Indication: cough    Comparison: Chest x-ray dated 6/17/2020    Findings:  Cardiac silhouette is enlarged. Vague bilateral lung opacities may represent edema or mild infiltrates. No significant pleural effusion or pneumothorax. No acute osseous lesion is seen. There are senescent changes of the aorta and skeletal structures.      Impression:      Impression:  1.Findings compatible with mild CHF. Concomitant mild infiltrates cannot be excluded.      Electronically Signed: Dom Noel MD    11/16/2024 12:21 PM EST    Workstation ID: [id number]            ECHOCARDIOGRAM:    Results for orders placed during the hospital encounter of 11/16/24    Adult Transthoracic Echo Complete W/ Cont if Necessary Per Protocol    Interpretation Summary    Left ventricular systolic function is normal. Calculated left ventricular EF = 64.3%    Left ventricular wall thickness is consistent with mild to moderate concentric hypertrophy.    Left ventricular diastolic function is consistent with (grade I) impaired relaxation.    The left atrial cavity is mildly dilated.    There is mild calcification of the aortic valve.    Estimated right ventricular systolic pressure from tricuspid regurgitation is markedly elevated (>55 mmHg).    Transthoracic echocardiography with EF of 64%.  Mild MR and no effusion    Electronically signed by Faustino García MD, 11/17/24, 2:35 PM EST.        I reviewed the patient's new clinical results.    EKG:      Assessment:       CHF (congestive heart failure)    1) acute on chronic diastolic heart failure  - CT chest w/o showed vascular congestion  -TSH WNL  - 2D echo 11/2024 shows an EF of 64% with  grade 1 diastolic dysfunction and mild MR.  - s/p IV lasix     2) Acute respiratory failure / parainfluenza virus / PNA  - pulmonary following  - repeat CXR suggests PNA     3) HTN     4) UTI    Plan:   Patient remains relatively compensated.  Daily weights trending downward.  Weight pending for today.  BP improving.  Tele shows SR with PACs brief run AT.         Electronically signed by BEBO Hess, 11/19/24, 3:02 PM EST.

## 2024-11-19 NOTE — PLAN OF CARE
Assessment: Sathya Flores presents with functional mobility impairments which indicate the need for skilled intervention. Pt demonstrates improvement in mobility this date, however continuing to demonstrate posterior lean, bradykinesia, and poor weight-shifting with transfers. Pt able to tolerate and is more attentive during session, reporting he feels better. Continue to benefit from SNF due to deficits and Parkinsonian-like symptoms. Tolerating session today without incident. Will continue to follow and progress as tolerated.

## 2024-11-19 NOTE — THERAPY EVALUATION
Acute Care - Speech Language Pathology   Swallow Initial Evaluation  Rory     Patient Name: Sathya Flores  : 1930  MRN: [id number]  Today's Date: 2024               Admit Date: 2024    Visit Dx:     ICD-10-CM ICD-9-CM   1. Acute congestive heart failure, unspecified heart failure type  I50.9 428.0   2. Elevated troponin  R79.89 790.6   3. Atrial fibrillation, unspecified type  I48.91 427.31     Patient Active Problem List   Diagnosis    CHF (congestive heart failure)     History reviewed. No pertinent past medical history.  History reviewed. No pertinent surgical history.    SLP Recommendation and Plan  SLP Swallowing Diagnosis: mild-moderate, oral dysphagia, suspected pharyngeal dysphagia, R/O pharyngeal dysphagia (24)  SLP Diet Recommendation: NPO (24)  Recommended Precautions and Strategies: upright posture during/after eating (24)  SLP Rec. for Method of Medication Administration: meds via alternate route (24)     Monitor for Signs of Aspiration: yes, notify SLP if any concerns (24)  Recommended Diagnostics: VFSS (MBS) (24)  Swallow Criteria for Skilled Therapeutic Interventions Met: demonstrates skilled criteria (24)     Rehab Potential/Prognosis, Swallowing: adequate, monitor progress closely (24)  Therapy Frequency (Swallow): 3 days per week, 4 days per week (24)  Predicted Duration Therapy Intervention (Days): until discharge (24)  Oral Care Recommendations: Oral Care BID/PRN, Before ice/water (24)     SWALLOW EVALUATION (Last 72 Hours)       SLP Adult Swallow Evaluation       Row Name 24       Rehab Evaluation    Document Type evaluation  -PF    Subjective Information no complaints  -PF    Patient Observations alert;cooperative;lethargic  -PF    Patient Effort good  -PF    Symptoms Noted During/After Treatment none  -PF       General Information     Patient Profile Reviewed yes  -PF    Pertinent History Of Current Problem Sathya Flores is a 89 y.o. male, who presented to [facility] ED on 11/16/2024 sent from urgent care due to concern for new onset CHF.  PMHx of hypertension, tremors, and glaucoma. He presented from free standing ER due to edema and shortness of breath. Per note from ER he does not lay flat and stay in a recliner. Spoke with son in law and he states patient lives alone and this is his first hospitalization. He states that family check in with him three times daily and more. He has been eating and drinking ok, has had some urinary urgency, and he agrees he is more confused but that is not his norm. Patient at examination is [place] and mildly confused. CXR revealed mild scattered patchy areas of airspace disease concerning for pneumonia. Orders received for dysphagia evaluation. Pt is currently receiving regular diet w/ thin liquids.  -PF    Current Method of Nutrition regular textures;thin liquids  -PF    Precautions/Limitations, Vision WFL;for purposes of eval  -PF    Precautions/Limitations, Hearing WFL;for purposes of eval  -PF    Prior Level of Function-Communication WFL  -PF    Prior Level of Function-Swallowing regular textures;thin liquids  -PF    Plans/Goals Discussed with patient;family  -PF    Barriers to Rehab none identified  -PF       Oral Motor Structure and Function    Dentition Assessment natural, present and adequate  -PF    Secretion Management WNL/WFL  -PF    Mucosal Quality moist, healthy  -PF       Oral Musculature and Cranial Nerve Assessment    Oral Motor General Assessment generalized oral motor weakness  -PF       General Eating/Swallowing Observations    Respiratory Support Currently in Use nasal cannula  -PF    O2 Liters 3L  -PF    Eating/Swallowing Skills fed by staff/caregiver  -PF    Positioning During Eating upright 90 degree;upright in chair  -PF    Utensils Used spoon;cup;straw  -PF    Consistencies Trialed regular  "textures;soft to chew textures;mechanical ground textures;pureed;thin liquids  -PF       Clinical Swallow Eval    Clinical Swallow Evaluation Summary Clinical swallow evaluation completed. Pt currently receiving regular diet w/ thin liquids at the time of evaluation. Family at bedside. Pt was consuming lunch, alert, weak, and receiving feeding assistance by family member, sister. Pt on 3L NC. Natural and adequate dentition. Lunch tray consisted of puree, stc and regular solids, and thin by straw. Oral phase c/b slow bolus manipulation and mastication on harder solids. Pt demonstrated oral residual and on all solids and required thin liquid wash to achieve oral clearance. Pharyngeal phase was marked by a prolonged \"wet\" cough, which was semi-productive, on all trials of thin by straw and once on stc solids. Per above, pt presents w/ mod oral dysphagia and suspect pharyngeal impairment. Per above clinical s/s of aspiration observed, VFSS is indicated to r/o pharyngeal impairment prior to resuming safest and L/R PO diet. Recommend strict NPO with the exception of single small ice chips when pt is awake/alert, sitting upright, and resp status is stable. Recommend frequent  prior to providing ice chips. D/t scheduling conflict w/ radiology today, plan for VFSS tomorrow morning w/ recs to follow.     Recommendations:    - NPO w/exeception of [place] Water Protocol (see guidelines below)  - Meds: via alternate route  - Upon thorough oral care, pt may have single small ice chips/water when pt is awake/alert, sitting upright, and resp status is stable  - Plan for VFSS tomorrow morning w/ recs to follow   Water Protocol:  The rationale to recommend water when a PO diet cannot appropriately/functionally sustain nutrition is because water is low risk for aspiration pna when compared to aspiration of food or other liquids.    Benefits of a water protocol include but are not limited to:     Oral gratification  Engagement of " oropharyngeal swallow musculature  Decrease likelihood of dehydration       Guidelines for proper implementation include:  Thorough oral care prior to consuming water  Upright at 90 degree hip flexion  Small sips at slow rate  Monitor for any changes in respiratory status and discontinue if distress noted     The [place] Free Water Protocol is a research-based protocol established in 1984. -PF       SLP Evaluation Clinical Impression    SLP Swallowing Diagnosis mild-moderate;oral dysphagia;suspected pharyngeal dysphagia;R/O pharyngeal dysphagia  -PF    Functional Impact risk of aspiration/pneumonia;risk of malnutrition;risk of dehydration  -PF    Rehab Potential/Prognosis, Swallowing adequate, monitor progress closely  -PF    Swallow Criteria for Skilled Therapeutic Interventions Met demonstrates skilled criteria  -PF       Recommendations    Therapy Frequency (Swallow) 3 days per week;4 days per week  -PF    Predicted Duration Therapy Intervention (Days) until discharge  -PF    SLP Diet Recommendation NPO  -PF    Recommended Diagnostics VFSS (MBS)  -PF    Recommended Precautions and Strategies upright posture during/after eating  -PF    Oral Care Recommendations Oral Care BID/PRN;Before ice/water  -PF    SLP Rec. for Method of Medication Administration meds via alternate route  -PF    Monitor for Signs of Aspiration yes;notify SLP if any concerns  -PF       Swallow Goals (SLP)    Swallow LTGs Swallow Long Term Goal (free text)  -PF    Swallow STGs diet tolerance goal selection (SLP)  -PF    Diet Tolerance Goal Selection (SLP) Swallow Short Term Goal 1;Swallow Short Term Goal 2  -PF       (LTG) Swallow    (LTG) Swallow Pt will maximize swallow function for least restrictive PO diet, exhibiting no complication associated with dysphagia, adequate PO intake, and demonstrating independent use of swallow compensation.  -PF    [place] (Swallow Long Term Goal) with minimal cues (75-90% accuracy)  -PF    Time Frame  (Swallow Long Term Goal) by discharge  -PF    Progress/Outcomes (Swallow Long Term Goal) new goal  -PF       (STG) Swallow 1    (STG) Swallow 1 Patient will participate in ongoing assessment of swallow, including reevaluation clinically and/or including instrumental assessment of swallow if indicated, to further assess swallow function and return to oral nutrition.  -PF    [place] (Swallow Short Term Goal 1) with minimal cues (75-90% accuracy)  -PF    Time Frame (Swallow Short Term Goal 1) 1 week  -PF    Progress/Outcomes (Swallow Short Term Goal 1) new goal  -PF       (STG) Swallow 2    (STG) Swallow 2 The patient will participate in a full meal assessment to determine safety and adequacy of recommended diet, independent use of safe swallow compensations, and additional goals/recommendations to follow.  -PF    [place] (Swallow Short Term Goal 2) with minimal cues (75-90% accuracy)  -PF    Time Frame (Swallow Short Term Goal 2) 1 week  -PF    Progress/Outcomes (Swallow Short Term Goal 2) new goal  -PF              User Key  (r) = Recorded By, (t) = Taken By, (c) = Cosigned By      Initials Name Effective Dates    PF Betty Pino, SLP 05/08/23 -                EDUCATION  The patient has been educated in the following areas:   Dysphagia (Swallowing Impairment) Oral Care/Hydration NPO rationale.      SLP GOALS       Row Name 11/19/24 1400       (LTG) Swallow    (LTG) Swallow Pt will maximize swallow function for least restrictive PO diet, exhibiting no complication associated with dysphagia, adequate PO intake, and demonstrating independent use of swallow compensation.  -PF    [place] (Swallow Long Term Goal) with minimal cues (75-90% accuracy)  -PF    Time Frame (Swallow Long Term Goal) by discharge  -PF    Progress/Outcomes (Swallow Long Term Goal) new goal  -PF       (STG) Swallow 1    (STG) Swallow 1 Patient will participate in ongoing assessment of swallow, including reevaluation clinically  and/or including instrumental assessment of swallow if indicated, to further assess swallow function and return to oral nutrition.  -PF    [place] (Swallow Short Term Goal 1) with minimal cues (75-90% accuracy)  -PF    Time Frame (Swallow Short Term Goal 1) 1 week  -PF    Progress/Outcomes (Swallow Short Term Goal 1) new goal  -PF       (STG) Swallow 2    (STG) Swallow 2 The patient will participate in a full meal assessment to determine safety and adequacy of recommended diet, independent use of safe swallow compensations, and additional goals/recommendations to follow.  -PF    [place] (Swallow Short Term Goal 2) with minimal cues (75-90% accuracy)  -PF    Time Frame (Swallow Short Term Goal 2) 1 week  -PF    Progress/Outcomes (Swallow Short Term Goal 2) new goal  -PF              User Key  (r) = Recorded By, (t) = Taken By, (c) = Cosigned By      Initials Name Provider Type    PF Fakto, [place], SLP Speech and Language Pathologist             LUIS Tuttle  11/19/2024

## 2024-11-19 NOTE — PROGRESS NOTES
Daily Progress Note        CHF (congestive heart failure)    Assessment:    Acute hypoxic respiratory insufficiency    Elevated troponin and proBNP  Iron deficiency    Abnormal UA, urine culture pending    Viral panel positive for parainfluenza  MRSA DNA probe positive     Recommendations:     Oxygen titration currently on 3 L nasal cannula  Low-dose steroids IV Solu-Medrol 20 mg daily  Empiric antibiotic Rocephin  Check urine cultures  DVT prophylaxis Lovenox 40 mg subcu daily  Protonix 40 mg daily                     LOS: 3 days     Subjective         Objective     Vital signs for last 24 hours:  Vitals:    11/18/24 2000 11/18/24 2100 11/18/24 2341 11/19/24 0307   BP: 111/55 99/59 137/59 123/71   BP Location:   Right arm Right arm   Patient Position:   Lying Lying   Pulse: 108 97 94 96   Resp:   (!) 31 (!) 34   Temp:   99.6 °F (37.6 °C) 100 °F (37.8 °C)   TempSrc:   Axillary Axillary   SpO2: 98% 96% 100% 97%   Weight:       Height:           Intake/Output last 3 shifts:  I/O last 3 completed shifts:  In: 980 [P.O.:980]  Out: 1000 [Urine:1000]  Intake/Output this shift:  No intake/output data recorded.      Radiology  Imaging Results (Last 24 Hours)       ** No results found for the last 24 hours. **            Labs:  Results from last 7 days   Lab Units 11/19/24  0446   WBC 10*3/mm3 13.12*   HEMOGLOBIN g/dL 11.4*   HEMATOCRIT % 34.8*   PLATELETS 10*3/mm3 149     Results from last 7 days   Lab Units 11/19/24  0446 11/16/24  1815 11/16/24  1238   SODIUM mmol/L 137   < > 136   POTASSIUM mmol/L 4.4   < > 4.4   CHLORIDE mmol/L 101   < > 101   CO2 mmol/L 26.4   < > 28.4   BUN mg/dL 32*   < > 29*   CREATININE mg/dL 0.88   < > 0.94   CALCIUM mg/dL 8.6   < > 8.9   BILIRUBIN mg/dL  --   --  0.2   ALK PHOS U/L  --   --  116   ALT (SGPT) U/L  --   --  21   AST (SGOT) U/L  --   --  18   GLUCOSE mg/dL 104*   < > 108*    < > = values in this interval not displayed.         Results from last 7 days   Lab Units 11/16/24  3174    ALBUMIN g/dL 3.3*     Results from last 7 days   Lab Units 11/16/24  1409 11/16/24  1238   HSTROP T ng/L 54* 56*         Results from last 7 days   Lab Units 11/19/24  0446   MAGNESIUM mg/dL 2.0         Results from last 7 days   Lab Units 11/16/24  1815   TSH uIU/mL 0.992           Meds:   SCHEDULE  amLODIPine, 5 mg, Oral, Daily  brimonidine, 1 drop, Right Eye, Q12H   And  timolol, 1 drop, Right Eye, Q12H  cefTRIAXone, 1,000 mg, Intravenous, Q24H  cloNIDine, 0.1 mg, Oral, BID  enoxaparin, 40 mg, Subcutaneous, Q24H  latanoprost, 1 drop, Right Eye, Nightly  methylPREDNISolone sodium succinate, 20 mg, Intravenous, Daily  metoprolol succinate XL, 12.5 mg, Oral, Q24H  pantoprazole, 40 mg, Oral, Q AM  primidone, 250 mg, Oral, TID  sodium chloride, 10 mL, Intravenous, Q12H      Infusions  Pharmacy to Dose enoxaparin (LOVENOX),       PRNs    acetaminophen **OR** acetaminophen **OR** acetaminophen    senna-docusate sodium **AND** polyethylene glycol **AND** bisacodyl **AND** bisacodyl    Calcium Replacement - Follow Nurse / BPA Driven Protocol    hydrALAZINE    Magnesium Standard Dose Replacement - Follow Nurse / BPA Driven Protocol    ondansetron ODT **OR** ondansetron    Pharmacy to Dose enoxaparin (LOVENOX)    Phosphorus Replacement - Follow Nurse / BPA Driven Protocol    Potassium Replacement - Follow Nurse / BPA Driven Protocol    [COMPLETED] Insert peripheral IV **AND** sodium chloride    sodium chloride    sodium chloride    Physical Exam:  Physical Exam  Cardiovascular:      Heart sounds: Murmur heard.      No gallop.   Pulmonary:      Effort: No respiratory distress.      Breath sounds: No stridor. Rhonchi and rales present. No wheezing.   Chest:      Chest wall: No tenderness.         ROS  Review of Systems   Respiratory:  Positive for cough and shortness of breath. Negative for wheezing and stridor.    Cardiovascular:  Positive for palpitations. Negative for chest pain and leg swelling.             Total time  spent with patient greater than: 45 Minutes

## 2024-11-19 NOTE — PLAN OF CARE
"Goal Outcome Evaluation:     Clinical swallow evaluation completed. Pt currently receiving regular diet w/ thin liquids at the time of evaluation. Family at bedside. Pt was consuming lunch, alert, weak, and receiving feeding assistance by family member, sister. Pt on 3L NC. Natural and adequate dentition. Lunch tray consisted of puree, stc and regular solids, and thin by straw. Oral phase c/b slow bolus manipulation and mastication on harder solids. Pt demonstrated oral residual and on all solids and required thin liquid wash to achieve oral clearance. Pharyngeal phase was marked by a prolonged \"wet\" cough, which was semi-productive, on all trials of thin by straw and once on stc solids. Per above, pt presents w/ mod oral dysphagia and suspect pharyngeal impairment. Per above clinical s/s of aspiration observed, VFSS is indicated to r/o pharyngeal impairment prior to resuming safest and L/R PO diet. Recommend strict NPO with the exception of single small ice chips when pt is awake/alert, sitting upright, and resp status is stable. Recommend frequent  prior to providing ice chips. D/t scheduling conflict w/ radiology today, plan for VFSS tomorrow morning w/ recs to follow.     Recommendations:    - NPO w/exeception of [place] Water Protocol (see guidelines below)  - Meds: via alternate route  - Upon thorough oral care, pt may have single small ice chips/water when pt is awake/alert, sitting upright, and resp status is stable  - Plan for VFSS tomorrow morning w/ recs to follow     Water Protocol:  The rationale to recommend water when a PO diet cannot appropriately/functionally sustain nutrition is because water is low risk for aspiration pna when compared to aspiration of food or other liquids.    Benefits of a water protocol include but are not limited to:     Oral gratification  Engagement of oropharyngeal swallow musculature  Decrease likelihood of dehydration       Guidelines for proper implementation " include:  Thorough oral care prior to consuming water  Upright at 90 degree hip flexion  Small sips at slow rate  Monitor for any changes in respiratory status and discontinue if distress noted     The [place] Free Water Protocol is a research-based protocol established in 1984. -PF                        SLP Swallowing Diagnosis: mild-moderate, oral dysphagia, suspected pharyngeal dysphagia, R/O pharyngeal dysphagia (11/19/24 1400)

## 2024-11-19 NOTE — PAYOR COMM NOTE
"This is clinical for Morgan Miranda.    AUTHORIZATION PENDIN   PLEASE FAX OR CALL DETERMINATION TO CONTACT BELOW:   THANK YOU.      Radha Lloyd, RN, CCM  Utilization Nurse  [address] [place] [address]   [phone]  Fx [phone]     Morgan Miranda (89 y.o. Male)       Date of Birth   1930    Social Security Number       Address   [address] [place] [address] [place] [address]    Home Phone   [phone]    MRN   [id number]       Orthodoxy   Unknown    Marital Status                               Admission Date   24    Admission Type   Urgent    Admitting Provider   Nasreen De La Cruz MD    Attending Provider   Chacha Mccarty MD    Department, Room/Bed   [facility] 2F,        Discharge Date       Discharge Disposition       Discharge Destination                                 Attending Provider: Chacha Mccarty MD    Allergies: No Known Allergies    Isolation: Contact Drop   Infection: None   Code Status: No CPR    Ht: 167.6 cm (66\")   Wt: 78.2 kg (172 lb 6.4 oz)    Admission Cmt: None   Principal Problem: CHF (congestive heart failure) [I50.9]                   Active Insurance as of 2024       Primary Coverage       Payor Plan Insurance Group Employer/Plan Group    [facility] MEDICARE REPLACEMENT AARP MED ADV HMO [id number]       Payor Plan Address Payor Plan Phone Number Payor Plan Fax Number Effective Dates    [address]   2024 - None Entered    [facility] [id number]         Subscriber Name Subscriber Birth Date Member ID       MORGAN MIRANDA 1930 [id number]                     Emergency Contacts        (Rel.) Home Phone Work Phone Mobile Phone    FILEDS,NEENA (Daughter) [phone] -- --    Rogelio Sandhu (Son) -- -- [phone]                 History & Physical        Candelaria Spears APRN at 24 1122       Attestation signed by Nasreen De La Cruz MD at 24 1314    I have reviewed this " documentation and agree.                      Patient Care Team:  Provider, No Known as PCP - Rogelio Khan MD as PCP - Family Medicine    Chief complaint Dyspnea    Subjective    Patient is a 89 y.o. male who presents with past medical history of hypertension, tremors, and glaucoma. He presented from free standing ER due to edema and shortness of breath. Per note from ER he does not lay flat and stay in a recliner. Spoke with son in law and he states patient lives alone and this is his first hospitalization. He states that family check in with him three times daily and more. He has been eating and drinking ok, has had some urinary urgency, and he agrees he is more confused but that is not his norm. Patient at examination is [place] and mildly confused. IN the ED, D-dimer elevated, cxr with pulmonary edema, leukocytosis,elevated troponin, and UTI with parainfluenzal virus and MRSA +.     Review of Systems   Unable to perform ROS: Mental status change          History  History reviewed. No pertinent past medical history.  History reviewed. No pertinent surgical history.  History reviewed. No pertinent family history.  Social History     Tobacco Use    Smoking status: Former     Types: Cigarettes   Vaping Use    Vaping status: Never Used   Substance Use Topics    Alcohol use: Not Currently    Drug use: Never     Medications Prior to Admission   Medication Sig Dispense Refill Last Dose/Taking    acetaminophen (TYLENOL) 325 MG tablet Take 2 tablets by mouth Every 4 (Four) Hours As Needed for Mild Pain.       amLODIPine (NORVASC) 5 MG tablet Take 1 tablet by mouth Daily.       cloNIDine (CATAPRES) 0.1 MG tablet Take 1 tablet by mouth 2 (Two) Times a Day.       lisinopril-hydrochlorothiazide (PRINZIDE,ZESTORETIC) 20-12.5 MG per tablet Take 1 tablet by mouth Daily.       PENTOXIFYLLINE ER PO Take 400 mg by mouth 3 (Three) Times a Day.       primidone (MYSOLINE) 250 MG tablet Take 1 tablet by mouth 3 (Three) Times a  Day.       vitamin B-6 (PYRIDOXINE) 50 MG tablet Take 0.5 tablets by mouth Daily.        Allergies:  Patient has no known allergies.    Objective    Vital Signs  Temp:  [98 °F (36.7 °C)-98.8 °F (37.1 °C)] 98.5 °F (36.9 °C)  Heart Rate:  [] 109  Resp:  [19-29] 22  BP: (112-156)/(50-89) 151/73       Physical Exam  Vitals reviewed.   Constitutional:       Appearance: He is ill-appearing.   HENT:      Head: Normocephalic and atraumatic.      Right Ear: External ear normal.      Left Ear: External ear normal.      Nose: Nose normal.      Mouth/Throat:      Mouth: Mucous membranes are dry.   Eyes:      General:         Right eye: No discharge.         Left eye: No discharge.   Cardiovascular:      Rate and Rhythm: Normal rate and regular rhythm.      Pulses: Normal pulses.      Heart sounds: Normal heart sounds.   Pulmonary:      Effort: Pulmonary effort is normal.      Breath sounds: Normal breath sounds.   Abdominal:      General: Bowel sounds are normal.   Musculoskeletal:         General: Normal range of motion.      Cervical back: Normal range of motion.   Skin:     General: Skin is warm.      Coloration: Skin is pale.   Neurological:      Mental Status: He is alert. He is disoriented.   Psychiatric:         Cognition and Memory: Cognition is impaired.          Results Review:     Imaging Results (Last 24 Hours)       Procedure Component Value Units Date/Time    XR Chest 1 View [[id number]] Collected: 11/16/24 1220     Updated: 11/16/24 1223    Narrative:      XR CHEST 1 VW    Date of Exam: 11/16/2024 12:07 PM EST    Indication: cough    Comparison: Chest x-ray dated 6/17/2020    Findings:  Cardiac silhouette is enlarged. Vague bilateral lung opacities may represent edema or mild infiltrates. No significant pleural effusion or pneumothorax. No acute osseous lesion is seen. There are senescent changes of the aorta and skeletal structures.      Impression:      Impression:  1.Findings compatible with mild CHF.  Concomitant mild infiltrates cannot be excluded.      Electronically Signed: Dom Noel MD    11/16/2024 12:21 PM EST    Workstation ID: [id number]             Lab Results (last 24 hours)       Procedure Component Value Units Date/Time    CBC & Differential [[id number]]  (Abnormal) Collected: 11/17/24 0622    Specimen: Blood from Arm, Right Updated: 11/17/24 0658    Narrative:      The following orders were created for panel order CBC & Differential.  Procedure                               Abnormality         Status                     ---------                               -----------         ------                     CBC Auto Differential[[id number]]        Abnormal            Final result                 Please view results for these tests on the individual orders.    CBC Auto Differential [[id number]]  (Abnormal) Collected: 11/17/24 0622    Specimen: Blood from Arm, Right Updated: 11/17/24 0658     WBC 12.50 10*3/mm3      RBC 4.14 10*6/mm3      Hemoglobin 13.2 g/dL      Hematocrit 40.3 %      MCV 97.3 fL      MCH 31.9 pg      MCHC 32.8 g/dL      RDW 14.5 %      RDW-SD 51.8 fl      MPV 10.1 fL      Platelets 195 10*3/mm3      Neutrophil % 69.2 %      Lymphocyte % 15.4 %      Monocyte % 14.7 %      Eosinophil % 0.2 %      Basophil % 0.2 %      Immature Grans % 0.3 %      Neutrophils, Absolute 8.64 10*3/mm3      Lymphocytes, Absolute 1.93 10*3/mm3      Monocytes, Absolute 1.84 10*3/mm3      Eosinophils, Absolute 0.02 10*3/mm3      Basophils, Absolute 0.03 10*3/mm3      Immature Grans, Absolute 0.04 10*3/mm3      nRBC 0.0 /100 WBC     Basic Metabolic Panel [[id number]]  (Abnormal) Collected: 11/17/24 0622    Specimen: Blood from Arm, Right Updated: 11/17/24 0657     Glucose 100 mg/dL      BUN 23 mg/dL      Creatinine 0.87 mg/dL      Sodium 139 mmol/L      Potassium 4.3 mmol/L      Chloride 99 mmol/L      CO2 29.0 mmol/L      Calcium 9.4 mg/dL      BUN/Creatinine Ratio 26.4     Anion Gap 11.0 mmol/L      eGFR 80.0  "mL/min/1.73     Narrative:      GFR Normal >60  Chronic Kidney Disease <60  Kidney Failure <15    The GFR formula is only valid for adults with stable renal function between ages 18 and 70.    Extra Tubes [[id number]] Collected: 11/17/24 0622    Specimen: Blood from Arm, Right Updated: 11/17/24 0630    Narrative:      The following orders were created for panel order Extra Tubes.  Procedure                               Abnormality         Status                     ---------                               -----------         ------                     Light Blue Top[[id number]]                                   Final result                 Please view results for these tests on the individual orders.    Light Blue Top [[id number]] Collected: 11/17/24 0622    Specimen: Blood from Arm, Right Updated: 11/17/24 0630     Extra Tube Hold for add-ons.     Comment: Auto resulted       D-dimer, Quantitative [[id number]]  (Abnormal) Collected: 11/16/24 2204    Specimen: Blood from Arm, Right Updated: 11/16/24 2232     D-Dimer, Quantitative 2.86 MCGFEU/mL     Narrative:      According to the assay 's published package insert, a normal (<0.50 MCGFEU/mL) D-dimer result in conjunction with a non-high clinical probability assessment, excludes deep vein thrombosis (DVT) and pulmonary embolism (PE) with high sensitivity.    D-dimer values increase with age and this can make VTE exclusion of an older population difficult. To address this, the American College of Physicians, based on best available evidence and recent guidelines, recommends that clinicians use age-adjusted D-dimer thresholds in patients greater than 50 years of age with: a) a low probability of PE who do not meet all Pulmonary Embolism Rule Out Criteria, or b) in those with intermediate probability of PE.   The formula for an age-adjusted D-dimer cut-off is \"age/100\".  For example, a 60 year old patient would have an age-adjusted cut-off of 0.60 MCGFEU/mL and " an 80 year old 0.80 MCGFEU/mL.    Basic Metabolic Panel [[id number]]  (Abnormal) Collected: 11/16/24 1815    Specimen: Blood Updated: 11/16/24 1902     Glucose 127 mg/dL      BUN 27 mg/dL      Creatinine 0.81 mg/dL      Sodium 138 mmol/L      Potassium 4.4 mmol/L      Comment: Specimen hemolyzed.  Result may be falsely elevated.        Chloride 102 mmol/L      CO2 25.5 mmol/L      Calcium 9.0 mg/dL      BUN/Creatinine Ratio 33.3     Anion Gap 10.5 mmol/L      eGFR 81.7 mL/min/1.73     Narrative:      GFR Normal >60  Chronic Kidney Disease <60  Kidney Failure <15    The GFR formula is only valid for adults with stable renal function between ages 18 and 70.    Magnesium [[id number]]  (Normal) Collected: 11/16/24 1815    Specimen: Blood Updated: 11/16/24 1902     Magnesium 1.9 mg/dL     Iron Profile [[id number]]  (Abnormal) Collected: 11/16/24 1815    Specimen: Blood Updated: 11/16/24 1857     Iron 23 mcg/dL      Iron Saturation (TSAT) 10 %      Transferrin 161 mg/dL      TIBC 240 mcg/dL     TSH Rfx On Abnormal To Free T4 [[id number]]  (Normal) Collected: 11/16/24 1815    Specimen: Blood Updated: 11/16/24 1857     TSH 0.992 uIU/mL     CBC & Differential [[id number]]  (Abnormal) Collected: 11/16/24 1815    Specimen: Blood Updated: 11/16/24 1836    Narrative:      The following orders were created for panel order CBC & Differential.  Procedure                               Abnormality         Status                     ---------                               -----------         ------                     CBC Auto Differential[[id number]]        Abnormal            Final result                 Please view results for these tests on the individual orders.    CBC Auto Differential [[id number]]  (Abnormal) Collected: 11/16/24 1815    Specimen: Blood Updated: 11/16/24 1836     WBC 10.44 10*3/mm3      RBC 3.82 10*6/mm3      Hemoglobin 11.6 g/dL      Hematocrit 37.8 %      MCV 99.0 fL      MCH 30.4 pg      MCHC 30.7 g/dL      RDW 14.6  %      RDW-SD 53.0 fl      MPV 10.2 fL      Platelets 195 10*3/mm3      Neutrophil % 65.2 %      Lymphocyte % 19.4 %      Monocyte % 14.0 %      Eosinophil % 0.9 %      Basophil % 0.2 %      Immature Grans % 0.3 %      Neutrophils, Absolute 6.81 10*3/mm3      Lymphocytes, Absolute 2.03 10*3/mm3      Monocytes, Absolute 1.46 10*3/mm3      Eosinophils, Absolute 0.09 10*3/mm3      Basophils, Absolute 0.02 10*3/mm3      Immature Grans, Absolute 0.03 10*3/mm3      nRBC 0.0 /100 WBC     MRSA Screen, PCR (Inpatient) - Swab, Nares [[id number]]  (Abnormal) Collected: 11/16/24 1715    Specimen: Swab from Nares Updated: 11/16/24 1835     MRSA PCR MRSA Detected    Narrative:      The negative predictive value of this diagnostic test is high and should only be used to consider de-escalating anti-MRSA therapy. A positive result may indicate colonization with MRSA and must be correlated clinically.    Respiratory Panel PCR w/COVID-19(SARS-CoV-2) LEVI/NIKO/JOSE ARMANDO/PAD/COR/RYNE In-House, NP Swab in UTM/VTM, 2 HR TAT - Swab, Nasopharynx [[id number]]  (Abnormal) Collected: 11/16/24 1715    Specimen: Swab from Nasopharynx Updated: 11/16/24 1815     ADENOVIRUS, PCR Not Detected     Coronavirus 229E Not Detected     Coronavirus HKU1 Not Detected     Coronavirus NL63 Not Detected     Coronavirus OC43 Not Detected     COVID19 Not Detected     Human Metapneumovirus Not Detected     Human Rhinovirus/Enterovirus Not Detected     Influenza A PCR Not Detected     Influenza B PCR Not Detected     Parainfluenza Virus 1 Not Detected     Parainfluenza Virus 2 Not Detected     Parainfluenza Virus 3 Not Detected     Parainfluenza Virus 4 Detected     RSV, PCR Not Detected     Bordetella pertussis pcr Not Detected     Bordetella parapertussis PCR Not Detected     Chlamydophila pneumoniae PCR Not Detected     Mycoplasma pneumo by PCR Not Detected    Narrative:      In the setting of a positive respiratory panel with a viral infection PLUS a negative  procalcitonin without other underlying concern for bacterial infection, consider observing off antibiotics or discontinuation of antibiotics and continue supportive care. If the respiratory panel is positive for atypical bacterial infection (Bordetella pertussis, Chlamydophila pneumoniae, or Mycoplasma pneumoniae), consider antibiotic de-escalation to target atypical bacterial infection.    Urinalysis With Microscopic If Indicated (No Culture) - Urine, Clean Catch [[id number]]  (Abnormal) Collected: 11/16/24 1715    Specimen: Urine, Clean Catch Updated: 11/16/24 1752     Color, UA Yellow     Appearance, UA Clear     pH, UA 5.5     Specific Gravity, UA 1.017     Glucose, UA Negative     Ketones, UA Negative     Bilirubin, UA Negative     Blood, UA Small (1+)     Protein, UA Trace     Leuk Esterase, UA Large (3+)     Nitrite, UA Positive     Urobilinogen, UA 1.0 E.U./dL    Urinalysis, Microscopic Only - Urine, Clean Catch [[id number]]  (Abnormal) Collected: 11/16/24 1715    Specimen: Urine, Clean Catch Updated: 11/16/24 1752     RBC, UA 0-2 /HPF      WBC, UA Too Numerous to Count /HPF      Bacteria, UA 4+ /HPF      Squamous Epithelial Cells, UA None Seen /HPF      Hyaline Casts, UA None Seen /LPF      Methodology Automated Microscopy    High Sensitivity Troponin T 2Hr [[id number]]  (Abnormal) Collected: 11/16/24 1409    Specimen: Blood Updated: 11/16/24 1429     HS Troponin T 54 ng/L      Troponin T Delta -2 ng/L     Narrative:      High Sensitive Troponin T Reference Range:  <14.0 ng/L- Negative Female for AMI  <22.0 ng/L- Negative Male for AMI  >=14 - Abnormal Female indicating possible myocardial injury.  >=22 - Abnormal Male indicating possible myocardial injury.   Clinicians would have to utilize clinical acumen, EKG, Troponin, and serial changes to determine if it is an Acute Myocardial Infarction or myocardial injury due to an underlying chronic condition.         Single High Sensitivity Troponin T [[id number]]   (Abnormal) Collected: 11/16/24 1238    Specimen: Blood Updated: 11/16/24 1314     HS Troponin T 56 ng/L     Narrative:      High Sensitive Troponin T Reference Range:  <14.0 ng/L- Negative Female for AMI  <22.0 ng/L- Negative Male for AMI  >=14 - Abnormal Female indicating possible myocardial injury.  >=22 - Abnormal Male indicating possible myocardial injury.   Clinicians would have to utilize clinical acumen, EKG, Troponin, and serial changes to determine if it is an Acute Myocardial Infarction or myocardial injury due to an underlying chronic condition.         BNP [[id number]]  (Abnormal) Collected: 11/16/24 1238    Specimen: Blood Updated: 11/16/24 1312     proBNP 2,608.0 pg/mL     Narrative:      This assay is used as an aid in the diagnosis of individuals suspected of having heart failure. It can be used as an aid in the diagnosis of acute decompensated heart failure (ADHF) in patients presenting with signs and symptoms of ADHF to the emergency department (ED). In addition, NT-proBNP of <300 pg/mL indicates ADHF is not likely.    Age Range Result Interpretation  NT-proBNP Concentration (pg/mL:      <50             Positive            >450                   Gray                 300-450                    Negative             <300    50-75           Positive            >900                  Gray                300-900                  Negative            <300      >75             Positive            >1800                  Gray                300-1800                  Negative            <300    Comprehensive Metabolic Panel [[id number]]  (Abnormal) Collected: 11/16/24 1238    Specimen: Blood Updated: 11/16/24 1306     Glucose 108 mg/dL      BUN 29 mg/dL      Creatinine 0.94 mg/dL      Sodium 136 mmol/L      Potassium 4.4 mmol/L      Chloride 101 mmol/L      CO2 28.4 mmol/L      Calcium 8.9 mg/dL      Total Protein 7.0 g/dL      Albumin 3.3 g/dL      ALT (SGPT) 21 U/L      AST (SGOT) 18 U/L      Alkaline  Phosphatase 116 U/L      Total Bilirubin 0.2 mg/dL      Globulin 3.7 gm/dL      A/G Ratio 0.9 g/dL      BUN/Creatinine Ratio 30.9     Anion Gap 6.6 mmol/L      eGFR 75.1 mL/min/1.73     Narrative:      GFR Normal >60  Chronic Kidney Disease <60  Kidney Failure <15    The GFR formula is only valid for adults with stable renal function between ages 18 and 70.    CBC & Differential [[id number]]  (Abnormal) Collected: 11/16/24 1238    Specimen: Blood Updated: 11/16/24 1253    Narrative:      The following orders were created for panel order CBC & Differential.  Procedure                               Abnormality         Status                     ---------                               -----------         ------                     CBC Auto Differential[[id number]]        Abnormal            Final result                 Please view results for these tests on the individual orders.    CBC Auto Differential [[id number]]  (Abnormal) Collected: 11/16/24 1238    Specimen: Blood Updated: 11/16/24 1253     WBC 10.80 10*3/mm3      RBC 3.76 10*6/mm3      Hemoglobin 11.8 g/dL      Hematocrit 37.8 %      .5 fL      MCH 31.4 pg      MCHC 31.2 g/dL      RDW 14.6 %      RDW-SD 54.9 fl      MPV 9.5 fL      Platelets 177 10*3/mm3      Neutrophil % 65.8 %      Lymphocyte % 17.8 %      Monocyte % 15.1 %      Eosinophil % 0.7 %      Basophil % 0.3 %      Immature Grans % 0.3 %      Neutrophils, Absolute 7.11 10*3/mm3      Lymphocytes, Absolute 1.92 10*3/mm3      Monocytes, Absolute 1.63 10*3/mm3      Eosinophils, Absolute 0.08 10*3/mm3      Basophils, Absolute 0.03 10*3/mm3      Immature Grans, Absolute 0.03 10*3/mm3     COVID-19 and FLU A/B PCR, 1 HR TAT - Swab, Nasopharynx [[id number]]  (Normal) Collected: 11/16/24 1204    Specimen: Swab from Nasopharynx Updated: 11/16/24 1241     COVID19 Not Detected     Influenza A PCR Not Detected     Influenza B PCR Not Detected    Narrative:      Fact sheet for providers:  https://www.fda.gov/media/933449/download    Fact sheet for patients: https://www.fda.gov/media/896020/download    Test performed by PCR.             I reviewed the patient's new clinical results.    Assessment & Plan    Dyspnea dyspnea  Pulmonary edema  Fluid overload  Parainfluenza virus  MRSA positive  Elevated troponin  Elevated D-dimer  -Cardiology pulmonary following  -Echo ordered/CT PE protocol    UTI  -rocephin  -follow culture    HTN  Amlodipine/clonidine/metoprolol    Essential tremors  -mysoline    DVT prophylaxis:lovenox  GI prophylaxis:protonix  Code status:DNR      I discussed the patient's findings and my recommendations with patient.     Candelaria Spears, APRN  11/17/24  11:22 EST        Electronically signed by Nasreen De La Cruz MD at 11/17/24 1314          Emergency Department Notes        Ellen Izaguirre RN at 11/16/24 1439          Attempt call report, no answer. Pt will be direct admit to 2210 via EMS when transport arrives.    Electronically signed by Ellen Izaguirre RN at 11/16/24 1440       Sathya Weinstein MD at 11/16/24 1340          Subjective   History of Present Illness  89-year-old male sent from urgent care due to concern for new onset CHF.  He has some cough swelling of the legs.  He denies any fevers or chills denies body aches just says he has mild shortness of breath and the cough that is wet sounding.  Does admit to some increased swelling the legs, denies any history of coronary artery disease denies congestive heart failure.  Never lays down in bed because he is generally too weak to get out of bed so he is in a recliner that can help stand him up.  Review of Systems  As noted in HPI  History reviewed. No pertinent past medical history.    No Known Allergies    History reviewed. No pertinent surgical history.    History reviewed. No pertinent family history.    Social History     Socioeconomic History    Marital status:    Tobacco Use    Smoking status: Former     Types:  Cigarettes   Vaping Use    Vaping status: Never Used   Substance and Sexual Activity    Alcohol use: Not Currently    Drug use: Never    Sexual activity: Defer           Objective   Physical Exam  Nursing note reviewed.  INITIAL VITAL SIGNS: Reviewed by me.  Pulse ox normal  GENERAL: Alert and interactive. No acute distress.  HEAD: Head is normocephalic.  EYES: EOMI. PERRL. No scleral icterus. No conjunctival injection.  ENT: Moist mucous membranes.   NECK: Supple. Full range of motion.  RESPIRATORY: No tachypnea.  Bilateral inspiratory crackles  CV: Irregularly irregular rhythm  ABDOMEN: Soft, nondistended, nontender. No guarding. No rebound. No masses.   BACK:  No obvious deformity.  EXTREMITIES: No deformity. No clubbing or cyanosis.  2+ pitting edema two thirds the way up the shin  SKIN: Warm and dry. No diaphoresis. No obvious rashes.   NEUROLOGIC: Alert and oriented. Face is symmetric. Speech is normal. Moves all extremities equally. Motor and sensory distally intact.       Procedures    EKG         EKG time/Interp time: 1159/1201  Rhythm/Rate: First-degree AV block with premature atrial complexes  P waves and MN:   QRS, axis: Normal QRS duration with a slight left axis deviation  ST and T waves: No ST elevations or depressions concerning for acute ischemia  Independently interpreted by me contemporaneously with treatment        ED Course  ED Course as of 11/16/24 1353   Sat Nov 16, 2024   1332 Patient with H&P as above, presentation consistent with new onset CHF versus bilateral pneumonia.  On exam he does have inspiratory crackles, heart sounds are irregularly irregular, has bilateral lower extremity edema.  EKG is without acute ischemia, read as sinus rhythm with PACs but I have suspicion for atrial fibrillation due to variability of heart rate on the monitor.  Labs and x-ray confirmed new onset CHF.  BNP is 2600 high-sensitivity troponin is 56.  X-ray is with edema.  Discussed with nurse practitioner for  [employer], stepping patient to Dr. Alejandro's service [RO]      ED Course User Index  [RO] Sathya Weinstein MD                                           Medical Decision Making  Problems Addressed:  Acute congestive heart failure, unspecified heart failure type: complicated acute illness or injury  Elevated troponin: complicated acute illness or injury    Amount and/or Complexity of Data Reviewed  Labs: ordered. Decision-making details documented in ED Course.  Radiology: ordered. Decision-making details documented in ED Course.  ECG/medicine tests: ordered and independent interpretation performed. Decision-making details documented in ED Course.    Risk  Prescription drug management.  Decision regarding hospitalization.        Final diagnoses:   Acute congestive heart failure, unspecified heart failure type   Elevated troponin   Atrial fibrillation, unspecified type       ED Disposition  ED Disposition       ED Disposition   Decision to Admit    Condition   --    Comment   Level of Care: Telemetry [5]   Admitting Physician: OMID ALEJANDRO [[id number]]   Patient Class: Inpatient [101]                 No follow-up provider specified.       Medication List      No changes were made to your prescriptions during this visit.           Electronically signed by Sathya Weinstein MD at 11/16/24 1353       Marlyn Schuster, RN at 11/16/24 1336          [facility] contacted for admission.    Electronically signed by Marlyn Schuster RN at 11/16/24 1337       Operative/Procedure Notes (last 4 days)  Notes from 11/15/24 1421 through 11/19/24 1421   No notes of this type exist for this encounter.          Physician Progress Notes (last 72 hours)        Chacha Mccarty MD at 11/19/24 0931               LOS: 3 days   Patient Care Team:  Provider, No Known as PCP - Rogelio Khan MD as PCP - Family Medicine    Subjective     Interval History:No significant change; persistent low grade fever    Patient Complaints: Congested cough,  generalized weakness    History taken from: patient    Review of Systems   Constitutional:  Positive for activity change, appetite change, fatigue and fever. Negative for chills and diaphoresis.   HENT:  Negative for facial swelling.    Eyes:  Negative for visual disturbance.   Respiratory:  Positive for cough, shortness of breath and wheezing.    Cardiovascular:  Negative for chest pain, palpitations and leg swelling.   Gastrointestinal:  Negative for abdominal pain, constipation, diarrhea, nausea and vomiting.   Endocrine: Negative for polyuria.   Genitourinary:  Negative for dysuria.   Musculoskeletal:  Positive for gait problem. Negative for arthralgias.   Neurological:  Negative for dizziness and light-headedness.   Psychiatric/Behavioral:  Negative for confusion.            Objective     Vital Signs  Temp:  [98 °F (36.7 °C)-100 °F (37.8 °C)] 98 °F (36.7 °C)  Heart Rate:  [] 96  Resp:  [20-34] 26  BP: ()/(52-71) 123/54    Physical Exam:     General Appearance:    Alert, answers questions appropriately, frail   Head:    Normocephalic, without obvious abnormality, atraumatic   Eyes:            Lids and lashes normal, conjunctivae and sclerae normal, no   icterus, no pallor, corneas clear, PERRLA   Ears:    Ears appear intact with no abnormalities noted   Throat:   No oral lesions, no thrush, oral mucosa moist   Neck:   No adenopathy, supple, trachea midline, no thyromegaly, no   carotid bruit, no JVD   Lungs:     Diffuse rhonchi, scattered wheezing    Heart:    Regular rhythm and normal rate, normal S1 and S2, no            murmur, no gallop, no rub, no click   Chest Wall:    No abnormalities observed   Abdomen:     Normal bowel sounds, no masses, no organomegaly, soft        Non-tender non-distended, no guarding,   Extremities:   Moves all extremities well, no edema, no cyanosis, no             Redness   Pulses:   Pulses palpable and equal bilaterally   Skin:   Scabs on chest wall, nose   Lymph  nodes:   No palpable adenopathy   Neurologic:   Cranial nerves 2 - 12 grossly intact, sensation intact, DTR       present and equal bilaterally        Results Review:    Lab Results (last 24 hours)       Procedure Component Value Units Date/Time    Magnesium [[id number]]  (Normal) Collected: 11/19/24 0446    Specimen: Blood Updated: 11/19/24 0537     Magnesium 2.0 mg/dL     Basic Metabolic Panel [[id number]]  (Abnormal) Collected: 11/19/24 0446    Specimen: Blood Updated: 11/19/24 0537     Glucose 104 mg/dL      BUN 32 mg/dL      Creatinine 0.88 mg/dL      Sodium 137 mmol/L      Potassium 4.4 mmol/L      Comment: Specimen hemolyzed.  Result may be falsely elevated.        Chloride 101 mmol/L      CO2 26.4 mmol/L      Calcium 8.6 mg/dL      BUN/Creatinine Ratio 36.4     Anion Gap 9.6 mmol/L      eGFR 79.7 mL/min/1.73     Narrative:      GFR Normal >60  Chronic Kidney Disease <60  Kidney Failure <15    The GFR formula is only valid for adults with stable renal function between ages 18 and 70.    CBC & Differential [[id number]]  (Abnormal) Collected: 11/19/24 0446    Specimen: Blood Updated: 11/19/24 0503    Narrative:      The following orders were created for panel order CBC & Differential.  Procedure                               Abnormality         Status                     ---------                               -----------         ------                     CBC Auto Differential[[id number]]        Abnormal            Final result                 Please view results for these tests on the individual orders.    CBC Auto Differential [[id number]]  (Abnormal) Collected: 11/19/24 0446    Specimen: Blood Updated: 11/19/24 0503     WBC 13.12 10*3/mm3      RBC 3.57 10*6/mm3      Hemoglobin 11.4 g/dL      Hematocrit 34.8 %      MCV 97.5 fL      MCH 31.9 pg      MCHC 32.8 g/dL      RDW 14.4 %      RDW-SD 51.7 fl      MPV 10.7 fL      Platelets 149 10*3/mm3      Neutrophil % 67.5 %      Lymphocyte % 14.5 %      Monocyte % 17.1  %      Eosinophil % 0.2 %      Basophil % 0.2 %      Immature Grans % 0.5 %      Neutrophils, Absolute 8.88 10*3/mm3      Lymphocytes, Absolute 1.90 10*3/mm3      Monocytes, Absolute 2.24 10*3/mm3      Eosinophils, Absolute 0.02 10*3/mm3      Basophils, Absolute 0.02 10*3/mm3      Immature Grans, Absolute 0.06 10*3/mm3      nRBC 0.0 /100 WBC     Urine Culture - Urine, Urine, Clean Catch [[id number]]  (Abnormal) Collected: 11/17/24 1213    Specimen: Urine, Clean Catch Updated: 11/18/24 1324     Urine Culture >100,000 CFU/mL Gram Negative Bacilli    Narrative:      Colonization of the urinary tract without infection is common. Treatment is discouraged unless the patient is symptomatic, pregnant, or undergoing an invasive urologic procedure.             Imaging Results (Last 24 Hours)       ** No results found for the last 24 hours. **                 I reviewed the patient's new clinical results.    Medication Review:   Scheduled Meds:amLODIPine, 5 mg, Oral, Daily  ampicillin-sulbactam, 3 g, Intravenous, Q6H  brimonidine, 1 drop, Right Eye, Q12H   And  timolol, 1 drop, Right Eye, Q12H  cloNIDine, 0.1 mg, Oral, BID  enoxaparin, 40 mg, Subcutaneous, Q24H  ipratropium-albuterol, 3 mL, Nebulization, TID - RT  latanoprost, 1 drop, Right Eye, Nightly  methylPREDNISolone sodium succinate, 20 mg, Intravenous, Daily  metoprolol succinate XL, 12.5 mg, Oral, Q24H  pantoprazole, 40 mg, Oral, Q AM  primidone, 250 mg, Oral, TID  sodium chloride, 10 mL, Intravenous, Q12H      Continuous Infusions:Pharmacy to Dose enoxaparin (LOVENOX),       PRN Meds:.  acetaminophen **OR** acetaminophen **OR** acetaminophen    senna-docusate sodium **AND** polyethylene glycol **AND** bisacodyl **AND** bisacodyl    Calcium Replacement - Follow Nurse / BPA Driven Protocol    hydrALAZINE    Magnesium Standard Dose Replacement - Follow Nurse / BPA Driven Protocol    ondansetron ODT **OR** ondansetron    Pharmacy to Dose enoxaparin (LOVENOX)    Phosphorus  Replacement - Follow Nurse / BPA Driven Protocol    Potassium Replacement - Follow Nurse / BPA Driven Protocol    [COMPLETED] Insert peripheral IV **AND** sodium chloride    sodium chloride    sodium chloride     Assessment & Plan       CHF (congestive heart failure)  - appears euvolemic and has elevated BUN/Cr ratio, no further diuresis  Parainfluenza virus -  steroids, Duonebs, Mucinex, supplemental oxygen  UTI (?) - awaiting urine culture;  Pneumonia - likelly developing secondary bacterial pneumonia, concern for aspiration.  Repeat chest xray, changing antibiotics to Unasyn.  ST eval  Tremor - primidone  HTN- metoprolol, amlodipine, clonidine  Glaucoma - home eye drops    Lovenox for dvt prophy    Plan for disposition:TBD    Chacha Mccarty MD  24  09:31 EST            Electronically signed by Chacha Mccarty MD at 24 0935       Filomena Melgar APRN at 24 1551          [facility] CARDIOLOGY  [facility]        LOS:  LOS: 2 days   Patient Name: Sathya Flores  Age/Sex: 89 y.o. male  : 1930  MRN: [id number]    Day of Service: 24   Length of Stay: 2  Encounter Provider: BEBO Hess  Place of Service: [facility] CARDIOLOGY  Patient Care Team:  Provider, No Known as PCP - Rogelio Khan MD as PCP - Family Medicine    Subjective:     Chief Complaint:  F/U HF    Subjective:   Patient sitting up in chair.  Appears lethargic.  Reports breathing a little better.    Current Medications:   Scheduled Meds:amLODIPine, 5 mg, Oral, Daily  cefTRIAXone, 1,000 mg, Intravenous, Q24H  cloNIDine, 0.1 mg, Oral, BID  enoxaparin, 40 mg, Subcutaneous, Q24H  methylPREDNISolone sodium succinate, 20 mg, Intravenous, Daily  metoprolol succinate XL, 12.5 mg, Oral, Q24H  primidone, 250 mg, Oral, TID  sodium chloride, 10 mL, Intravenous, Q12H      Continuous Infusions:Pharmacy to Dose enoxaparin (LOVENOX),         Allergies:  No  "Known Allergies    ROS    Objective:     Temp:  [98.5 °F (36.9 °C)-100.6 °F (38.1 °C)] 98.9 °F (37.2 °C)  Heart Rate:  [] 82  Resp:  [20-37] 20  BP: ()/(43-76) 112/55     Intake/Output Summary (Last 24 hours) at 11/18/2024 1552  Last data filed at 11/18/2024 1200  Gross per 24 hour   Intake 750 ml   Output 400 ml   Net 350 ml     Body mass index is 27.83 kg/m².      11/16/24  1144 11/16/24  1630 11/18/24  0405   Weight: 79.8 kg (176 lb) 79.7 kg (175 lb 11.3 oz) 78.2 kg (172 lb 6.4 oz)         Physical Exam:  Neuro:  CV:  Resp:  GI:  Ext:  Tele: AAOx3, generalized weakness  S1S2 RRR, no murmur  Nonlabored, faint wheezing  BS+, abd soft  Pedal pulses palp, no edema  SR with PACs                                                   Lab Review:   Results from last 7 days   Lab Units 11/18/24  0456 11/17/24  0622 11/16/24  1815 11/16/24  1238   SODIUM mmol/L 138 139   < > 136   POTASSIUM mmol/L 4.2 4.3   < > 4.4   CHLORIDE mmol/L 100 99   < > 101   CO2 mmol/L 29.3* 29.0   < > 28.4   BUN mg/dL 29* 23   < > 29*   CREATININE mg/dL 1.08 0.87   < > 0.94   GLUCOSE mg/dL 105* 100*   < > 108*   CALCIUM mg/dL 8.9 9.4   < > 8.9   AST (SGOT) U/L  --   --   --  18   ALT (SGPT) U/L  --   --   --  21    < > = values in this interval not displayed.     Results from last 7 days   Lab Units 11/16/24  1409 11/16/24  1238   HSTROP T ng/L 54* 56*     Results from last 7 days   Lab Units 11/18/24  0456 11/17/24  0622   WBC 10*3/mm3 15.43* 12.50*   HEMOGLOBIN g/dL 12.1* 13.2   HEMATOCRIT % 38.7 40.3   PLATELETS 10*3/mm3 163 195         Results from last 7 days   Lab Units 11/16/24  1815   MAGNESIUM mg/dL 1.9           Invalid input(s): \"LDLCALC\"  Results from last 7 days   Lab Units 11/16/24  1238   PROBNP pg/mL 2,608.0*     Results from last 7 days   Lab Units 11/16/24  1815   TSH uIU/mL 0.992       Recent Radiology:  Imaging Results (Most Recent)       Procedure Component Value Units Date/Time    CT Chest Without Contrast " Diagnostic [[id number]] Collected: 11/17/24 2013     Updated: 11/17/24 2020    Narrative:      CT CHEST WO CONTRAST DIAGNOSTIC    Date of Exam: 11/17/2024 8:00 PM EST    Indication: pulm edema vs infiltrate.    Comparison: Chest CT dated 8/18/2021    Technique: Axial CT images were obtained of the chest without contrast administration.  Sagittal and coronal reconstructions were performed.  Automated exposure control and iterative reconstruction methods were used.      FINDINGS:    Thoracic inlet: Unremarkable.    Great vessels: Atherosclerotic plaque is seen within the thoracic aorta and proximal arch vessels.    Mediastinum/Anna: Scattered subcentimeter mediastinal lymph nodes are noted, nonspecific. The esophagus appears unremarkable.    Lung parenchyma: Motion artifact limits evaluation of the lungs. Paraseptal emphysematous changes noted within the bilateral upper lobes. Mild hypoventilatory changes within the bilateral lung bases. Mild bilateral lower lobe infiltrates cannot be   excluded.    Trachea and airways: The trachea and central airways appear unremarkable.    Pleural space: No significant pleural effusion or pneumothorax.    Heart and pericardium: Coronary artery calcifications are present. Otherwise, the heart and pericardium appear unremarkable.    Chest wall: No acute or suspicious osseous or soft tissue lesion is identified.    Upper abdomen: No acute abnormality is identified within the visualized upper abdomen.      Impression:      1.Examination is limited due to motion artifact.  2.Mild bilateral lower lobe infiltrates cannot be excluded.  3.Paraseptal emphysematous changes within the bilateral upper lobes.  4.Additional findings as detailed above.        Electronically Signed: Dom Noel MD    11/17/2024 8:18 PM EST    Workstation ID: [id number]    XR Chest 1 View [[id number]] Collected: 11/16/24 1220     Updated: 11/16/24 1223    Narrative:      XR CHEST 1 VW    Date of Exam: 11/16/2024  12:07 PM EST    Indication: cough    Comparison: Chest x-ray dated 6/17/2020    Findings:  Cardiac silhouette is enlarged. Vague bilateral lung opacities may represent edema or mild infiltrates. No significant pleural effusion or pneumothorax. No acute osseous lesion is seen. There are senescent changes of the aorta and skeletal structures.      Impression:      Impression:  1.Findings compatible with mild CHF. Concomitant mild infiltrates cannot be excluded.      Electronically Signed: Dom Noel MD    11/16/2024 12:21 PM EST    Workstation ID: [id number]            ECHOCARDIOGRAM:    Results for orders placed during the hospital encounter of 11/16/24    Adult Transthoracic Echo Complete W/ Cont if Necessary Per Protocol    Interpretation Summary    Left ventricular systolic function is normal. Calculated left ventricular EF = 64.3%    Left ventricular wall thickness is consistent with mild to moderate concentric hypertrophy.    Left ventricular diastolic function is consistent with (grade I) impaired relaxation.    The left atrial cavity is mildly dilated.    There is mild calcification of the aortic valve.    Estimated right ventricular systolic pressure from tricuspid regurgitation is markedly elevated (>55 mmHg).    Transthoracic echocardiography with EF of 64%.  Mild MR and no effusion    Electronically signed by Faustino García MD, 11/17/24, 2:35 PM EST.        I reviewed the patient's new clinical results.    EKG:      Assessment:       CHF (congestive heart failure)    1) acute on chronic diastolic heart failure  - CT chest w/o showed vascular congestion  -TSH WNL  - 2D echo 11/2024 shows an EF of 64% with grade 1 diastolic dysfunction and mild MR.  - s/p IV lasix    2) Acute respiratory failure / parainfluenza virus  - pulmonary following    3) HTN    4) UTI      Plan:   Patient appears relatively compensated with respect to volume.  Tele shows SR with frequent PACs.  Check Mg.  Continue on BB.  BP  soft.  Will put BP parameters on amlodipine.        Electronically signed by BEBO Hess, 11/18/24, 4:06 PM EST.     Electronically signed by Filomena Melgar APRN at 11/18/24 1606       Chacha Mccarty MD at 11/18/24 1024               LOS: 2 days   Patient Care Team:  Provider, No Known as PCP - General Grullon, Rogelio SCRUGGS MD as PCP - Family Medicine    Subjective     Interval History: No significant change    Patient Complaints: Confused, unable to voice specific complaints.    History taken from: patient    Review of Systems   Unable to perform ROS: Mental status change           Objective     Vital Signs  Temp:  [98.5 °F (36.9 °C)-100.6 °F (38.1 °C)] 98.9 °F (37.2 °C)  Heart Rate:  [] 82  Resp:  [20-37] 20  BP: ()/(43-75) 112/55    Physical Exam:     General Appearance:  Awake, sitting up in chair, hard of hearing, follows commands well   Head:    Normocephalic, without obvious abnormality, atraumatic   Eyes:            Lids and lashes normal, conjunctivae and sclerae normal, no   icterus, no pallor, corneas clear, PERRLA   Ears:    Ears appear intact with no abnormalities noted   Throat:   No oral lesions, no thrush, oral mucosa moist   Neck:   No adenopathy, supple, trachea midline, no thyromegaly, no   carotid bruit, no JVD   Lungs:     Clear to auscultation,respirations regular, even and                  unlabored    Heart:    Regular rhythm and normal rate, normal S1 and S2, no            murmur, no gallop, no rub, no click   Chest Wall:    No abnormalities observed   Abdomen:     Normal bowel sounds, no masses, no organomegaly, soft        Non-tender non-distended, no guarding,   Extremities:   Moves all extremities well, no edema, no cyanosis, no             Redness   Pulses:   Pulses palpable and equal bilaterally   Skin:   No bleeding, bruising or rash   Lymph nodes:   No palpable adenopathy   Neurologic:   Cranial nerves 2 - 12 grossly intact, sensation intact, DTR        present and equal bilaterally        Results Review:    Lab Results (last 24 hours)       Procedure Component Value Units Date/Time    Urine Culture - Urine, Urine, Clean Catch [[id number]]  (Abnormal) Collected: 11/17/24 1213    Specimen: Urine, Clean Catch Updated: 11/18/24 1324     Urine Culture >100,000 CFU/mL Gram Negative Bacilli    Narrative:      Colonization of the urinary tract without infection is common. Treatment is discouraged unless the patient is symptomatic, pregnant, or undergoing an invasive urologic procedure.    Basic Metabolic Panel [[id number]]  (Abnormal) Collected: 11/18/24 0456    Specimen: Blood Updated: 11/18/24 0539     Glucose 105 mg/dL      BUN 29 mg/dL      Creatinine 1.08 mg/dL      Sodium 138 mmol/L      Potassium 4.2 mmol/L      Comment: Specimen hemolyzed.  Result may be falsely elevated.        Chloride 100 mmol/L      CO2 29.3 mmol/L      Calcium 8.9 mg/dL      BUN/Creatinine Ratio 26.9     Anion Gap 8.7 mmol/L      eGFR 63.6 mL/min/1.73     Narrative:      GFR Normal >60  Chronic Kidney Disease <60  Kidney Failure <15    The GFR formula is only valid for adults with stable renal function between ages 18 and 70.    CBC & Differential [[id number]]  (Abnormal) Collected: 11/18/24 0456    Specimen: Blood Updated: 11/18/24 0502    Narrative:      The following orders were created for panel order CBC & Differential.  Procedure                               Abnormality         Status                     ---------                               -----------         ------                     CBC Auto Differential[[id number]]        Abnormal            Final result                 Please view results for these tests on the individual orders.    CBC Auto Differential [[id number]]  (Abnormal) Collected: 11/18/24 0456    Specimen: Blood Updated: 11/18/24 0502     WBC 15.43 10*3/mm3      RBC 3.88 10*6/mm3      Hemoglobin 12.1 g/dL      Hematocrit 38.7 %      MCV 99.7 fL      MCH 31.2 pg      MCHC  31.3 g/dL      RDW 14.6 %      RDW-SD 53.3 fl      MPV 9.9 fL      Platelets 163 10*3/mm3      Neutrophil % 59.0 %      Lymphocyte % 21.3 %      Monocyte % 18.9 %      Eosinophil % 0.1 %      Basophil % 0.2 %      Immature Grans % 0.5 %      Neutrophils, Absolute 9.12 10*3/mm3      Lymphocytes, Absolute 3.28 10*3/mm3      Monocytes, Absolute 2.92 10*3/mm3      Eosinophils, Absolute 0.01 10*3/mm3      Basophils, Absolute 0.03 10*3/mm3      Immature Grans, Absolute 0.07 10*3/mm3      nRBC 0.0 /100 WBC              Imaging Results (Last 24 Hours)       Procedure Component Value Units Date/Time    CT Chest Without Contrast Diagnostic [[id number]] Collected: 11/17/24 2013     Updated: 11/17/24 2020    Narrative:      CT CHEST WO CONTRAST DIAGNOSTIC    Date of Exam: 11/17/2024 8:00 PM EST    Indication: pulm edema vs infiltrate.    Comparison: Chest CT dated 8/18/2021    Technique: Axial CT images were obtained of the chest without contrast administration.  Sagittal and coronal reconstructions were performed.  Automated exposure control and iterative reconstruction methods were used.      FINDINGS:    Thoracic inlet: Unremarkable.    Great vessels: Atherosclerotic plaque is seen within the thoracic aorta and proximal arch vessels.    Mediastinum/Anna: Scattered subcentimeter mediastinal lymph nodes are noted, nonspecific. The esophagus appears unremarkable.    Lung parenchyma: Motion artifact limits evaluation of the lungs. Paraseptal emphysematous changes noted within the bilateral upper lobes. Mild hypoventilatory changes within the bilateral lung bases. Mild bilateral lower lobe infiltrates cannot be   excluded.    Trachea and airways: The trachea and central airways appear unremarkable.    Pleural space: No significant pleural effusion or pneumothorax.    Heart and pericardium: Coronary artery calcifications are present. Otherwise, the heart and pericardium appear unremarkable.    Chest wall: No acute or suspicious  osseous or soft tissue lesion is identified.    Upper abdomen: No acute abnormality is identified within the visualized upper abdomen.      Impression:      1.Examination is limited due to motion artifact.  2.Mild bilateral lower lobe infiltrates cannot be excluded.  3.Paraseptal emphysematous changes within the bilateral upper lobes.  4.Additional findings as detailed above.        Electronically Signed: Dom Noel MD    11/17/2024 8:18 PM EST    Workstation ID: [id number]                 I reviewed the patient's new clinical results.    Medication Review:   Scheduled Meds:amLODIPine, 5 mg, Oral, Daily  brimonidine, 1 drop, Right Eye, Q12H   And  timolol, 1 drop, Right Eye, Q12H  cefTRIAXone, 1,000 mg, Intravenous, Q24H  cloNIDine, 0.1 mg, Oral, BID  enoxaparin, 40 mg, Subcutaneous, Q24H  latanoprost, 1 drop, Right Eye, Nightly  methylPREDNISolone sodium succinate, 20 mg, Intravenous, Daily  metoprolol succinate XL, 12.5 mg, Oral, Q24H  primidone, 250 mg, Oral, TID  sodium chloride, 10 mL, Intravenous, Q12H      Continuous Infusions:Pharmacy to Dose enoxaparin (LOVENOX),       PRN Meds:.  acetaminophen **OR** acetaminophen **OR** acetaminophen    senna-docusate sodium **AND** polyethylene glycol **AND** bisacodyl **AND** bisacodyl    Calcium Replacement - Follow Nurse / BPA Driven Protocol    hydrALAZINE    Magnesium Standard Dose Replacement - Follow Nurse / BPA Driven Protocol    ondansetron ODT **OR** ondansetron    Pharmacy to Dose enoxaparin (LOVENOX)    Phosphorus Replacement - Follow Nurse / BPA Driven Protocol    Potassium Replacement - Follow Nurse / BPA Driven Protocol    [COMPLETED] Insert peripheral IV **AND** sodium chloride    sodium chloride    sodium chloride     Assessment & Plan       CHF (congestive heart failure)  -Improved with diuresis;  Pulmonary hypertension-severe  Essential hypertension-metoprolol, clonidine, amlodipine  Chronic tremor-primidone  Urinary tract infection-preliminary  culture results noted; continue ceftriaxone  Parainfluenza virus-supportive care, steroids, bronchodilators  DVT prophylaxis-Lovenox  GERD-PPI          Plan for disposition: Likely needs SNF before returning home.    Chacha Mccarty MD  11/18/24  18:24 EST            Electronically signed by Chacha Mccarty MD at 11/18/24 1827       Gian Pérez MD at 11/18/24 0810          Daily Progress Note        CHF (congestive heart failure)    Assessment:    Acute hypoxic respiratory insufficiency    Elevated troponin and proBNP  Iron deficiency    Abnormal UA, urine culture pending    Viral panel positive for parainfluenza  MRSA DNA probe positive     Recommendations:     Oxygen titration currently on 5 L nasal cannula  Low-dose steroids IV Solu-Medrol 20 mg daily  Empiric antibiotic Rocephin  Check urine cultures  DVT prophylaxis Lovenox 40 mg subcu daily  Protonix 40 mg daily                     LOS: 2 days     Subjective         Objective     Vital signs for last 24 hours:  Vitals:    11/18/24 0405 11/18/24 0500 11/18/24 0605 11/18/24 0721   BP: 109/52 111/60 133/64 117/72   BP Location: Right arm   Right arm   Patient Position: Lying   Lying   Pulse: 84 90 97 97   Resp: 26   27   Temp: 99.5 °F (37.5 °C)   100.4 °F (38 °C)   TempSrc: Oral   Axillary   SpO2: 95% 99% 98% 95%   Weight: 78.2 kg (172 lb 6.4 oz)      Height:           Intake/Output last 3 shifts:  I/O last 3 completed shifts:  In: 955 [P.O.:955]  Out: 1950 [Urine:1950]  Intake/Output this shift:  No intake/output data recorded.      Radiology  Imaging Results (Last 24 Hours)       Procedure Component Value Units Date/Time    CT Chest Without Contrast Diagnostic [[id number]] Collected: 11/17/24 2013     Updated: 11/17/24 2020    Narrative:      CT CHEST WO CONTRAST DIAGNOSTIC    Date of Exam: 11/17/2024 8:00 PM EST    Indication: pulm edema vs infiltrate.    Comparison: Chest CT dated 8/18/2021    Technique: Axial CT images were obtained of the chest without contrast  administration.  Sagittal and coronal reconstructions were performed.  Automated exposure control and iterative reconstruction methods were used.      FINDINGS:    Thoracic inlet: Unremarkable.    Great vessels: Atherosclerotic plaque is seen within the thoracic aorta and proximal arch vessels.    Mediastinum/Anna: Scattered subcentimeter mediastinal lymph nodes are noted, nonspecific. The esophagus appears unremarkable.    Lung parenchyma: Motion artifact limits evaluation of the lungs. Paraseptal emphysematous changes noted within the bilateral upper lobes. Mild hypoventilatory changes within the bilateral lung bases. Mild bilateral lower lobe infiltrates cannot be   excluded.    Trachea and airways: The trachea and central airways appear unremarkable.    Pleural space: No significant pleural effusion or pneumothorax.    Heart and pericardium: Coronary artery calcifications are present. Otherwise, the heart and pericardium appear unremarkable.    Chest wall: No acute or suspicious osseous or soft tissue lesion is identified.    Upper abdomen: No acute abnormality is identified within the visualized upper abdomen.      Impression:      1.Examination is limited due to motion artifact.  2.Mild bilateral lower lobe infiltrates cannot be excluded.  3.Paraseptal emphysematous changes within the bilateral upper lobes.  4.Additional findings as detailed above.        Electronically Signed: Dom Noel MD    11/17/2024 8:18 PM EST    Workstation ID: [id number]            Labs:  Results from last 7 days   Lab Units 11/18/24  0456   WBC 10*3/mm3 15.43*   HEMOGLOBIN g/dL 12.1*   HEMATOCRIT % 38.7   PLATELETS 10*3/mm3 163     Results from last 7 days   Lab Units 11/18/24  0456 11/16/24  1815 11/16/24  1238   SODIUM mmol/L 138   < > 136   POTASSIUM mmol/L 4.2   < > 4.4   CHLORIDE mmol/L 100   < > 101   CO2 mmol/L 29.3*   < > 28.4   BUN mg/dL 29*   < > 29*   CREATININE mg/dL 1.08   < > 0.94   CALCIUM mg/dL 8.9   < > 8.9    BILIRUBIN mg/dL  --   --  0.2   ALK PHOS U/L  --   --  116   ALT (SGPT) U/L  --   --  21   AST (SGOT) U/L  --   --  18   GLUCOSE mg/dL 105*   < > 108*    < > = values in this interval not displayed.         Results from last 7 days   Lab Units 11/16/24  1238   ALBUMIN g/dL 3.3*     Results from last 7 days   Lab Units 11/16/24  1409 11/16/24  1238   HSTROP T ng/L 54* 56*         Results from last 7 days   Lab Units 11/16/24  1815   MAGNESIUM mg/dL 1.9         Results from last 7 days   Lab Units 11/16/24  1815   TSH uIU/mL 0.992           Meds:   SCHEDULE  amLODIPine, 5 mg, Oral, Daily  cefTRIAXone, 1,000 mg, Intravenous, Q24H  cloNIDine, 0.1 mg, Oral, BID  enoxaparin, 40 mg, Subcutaneous, Q24H  methylPREDNISolone sodium succinate, 20 mg, Intravenous, Daily  metoprolol succinate XL, 12.5 mg, Oral, Q24H  primidone, 250 mg, Oral, TID  sodium chloride, 10 mL, Intravenous, Q12H      Infusions  Pharmacy to Dose enoxaparin (LOVENOX),       PRNs    acetaminophen **OR** acetaminophen **OR** acetaminophen    senna-docusate sodium **AND** polyethylene glycol **AND** bisacodyl **AND** bisacodyl    Calcium Replacement - Follow Nurse / BPA Driven Protocol    hydrALAZINE    Magnesium Standard Dose Replacement - Follow Nurse / BPA Driven Protocol    ondansetron ODT **OR** ondansetron    Pharmacy to Dose enoxaparin (LOVENOX)    Phosphorus Replacement - Follow Nurse / BPA Driven Protocol    Potassium Replacement - Follow Nurse / BPA Driven Protocol    [COMPLETED] Insert peripheral IV **AND** sodium chloride    sodium chloride    sodium chloride    Physical Exam:  Physical Exam  Cardiovascular:      Heart sounds: Murmur heard.      No gallop.   Pulmonary:      Effort: No respiratory distress.      Breath sounds: No stridor. Rhonchi and rales present. No wheezing.   Chest:      Chest wall: No tenderness.         ROS  Review of Systems   Respiratory:  Positive for cough and shortness of breath. Negative for wheezing and stridor.     Cardiovascular:  Positive for palpitations. Negative for chest pain and leg swelling.             Total time spent with patient greater than: 45 Minutes    Electronically signed by Gian Pérez MD at 11/18/24 1025       Nutrition Notes (last 72 hours)  Notes from 11/16/24 1421 through 11/19/24 1421   No notes exist for this encounter.

## 2024-11-19 NOTE — PAYOR COMM NOTE
"This is for [facility].    Patient: Morgan Miranda.   1930    Here is approval for online attempt and call for IP submission. Patient was admitted on 24. [facility] doesn't work on weekend.      THANK YOU.      Radha Lloyd, RN, CCM  Utilization Nurse  [address] [place] [address]   [phone]  Fx [phone]                     Morgan Miranda (89 y.o. Male)       Date of Birth   1930    Social Security Number       Address   [address] [place] [address] [place] [address]    Home Phone   [phone]    MRN   [id number]       [place]   Unknown    Marital Status                               Admission Date   24    Admission Type   Urgent    Admitting Provider   Nasreen De La Cruz MD    Attending Provider   Chacha Mccarty MD    Department, Room/Bed   [facility] 2F,        Discharge Date       Discharge Disposition       Discharge Destination                                 Attending Provider: Chacha Mccarty MD    Allergies: No Known Allergies    Isolation: Contact Drop   Infection: None   Code Status: No CPR    Ht: 167.6 cm (66\")   Wt: 78.2 kg (172 lb 6.4 oz)    Admission Cmt: None   Principal Problem: CHF (congestive heart failure) [I50.9]                   Active Insurance as of 2024       Primary Coverage       Payor Plan Insurance Group Employer/Plan Group    [facility] MEDICARE REPLACEMENT AARP MED ADV HMO [id number]       Payor Plan Address Payor Plan Phone Number Payor Plan Fax Number Effective Dates    [address]   2024 - None Entered    [facility] [id number]         Subscriber Name Subscriber Birth Date Member ID       MORGAN MIRANDA 1930 [id number]                     Emergency Contacts        (Rel.) Home Phone Work Phone Mobile Phone    ALENADSNEENA (Daughter) [phone] -- --    Rogelio Sandhu (Son) -- -- [phone]                 Emergency Department Notes        Ellen Izaguirre RN at " 11/16/24 1439          Attempt call report, no answer. Pt will be direct admit to 2210 via EMS when transport arrives.    Electronically signed by Ellen Izaguirre RN at 11/16/24 1440       Sathya Weinstein MD at 11/16/24 1340          Subjective   History of Present Illness  89-year-old male sent from urgent care due to concern for new onset CHF.  He has some cough swelling of the legs.  He denies any fevers or chills denies body aches just says he has mild shortness of breath and the cough that is wet sounding.  Does admit to some increased swelling the legs, denies any history of coronary artery disease denies congestive heart failure.  Never lays down in bed because he is generally too weak to get out of bed so he is in a recliner that can help stand him up.  Review of Systems  As noted in HPI  History reviewed. No pertinent past medical history.    No Known Allergies    History reviewed. No pertinent surgical history.    History reviewed. No pertinent family history.    Social History     Socioeconomic History    Marital status:    Tobacco Use    Smoking status: Former     Types: Cigarettes   Vaping Use    Vaping status: Never Used   Substance and Sexual Activity    Alcohol use: Not Currently    Drug use: Never    Sexual activity: Defer           Objective   Physical Exam  Nursing note reviewed.  INITIAL VITAL SIGNS: Reviewed by me.  Pulse ox normal  GENERAL: Alert and interactive. No acute distress.  HEAD: Head is normocephalic.  EYES: EOMI. PERRL. No scleral icterus. No conjunctival injection.  ENT: Moist mucous membranes.   NECK: Supple. Full range of motion.  RESPIRATORY: No tachypnea.  Bilateral inspiratory crackles  CV: Irregularly irregular rhythm  ABDOMEN: Soft, nondistended, nontender. No guarding. No rebound. No masses.   BACK:  No obvious deformity.  EXTREMITIES: No deformity. No clubbing or cyanosis.  2+ pitting edema two thirds the way up the shin  SKIN: Warm and dry. No diaphoresis. No  obvious rashes.   NEUROLOGIC: Alert and oriented. Face is symmetric. Speech is normal. Moves all extremities equally. Motor and sensory distally intact.       Procedures    EKG         EKG time/Interp time: 1159/1201  Rhythm/Rate: First-degree AV block with premature atrial complexes  P waves and ID:   QRS, axis: Normal QRS duration with a slight left axis deviation  ST and T waves: No ST elevations or depressions concerning for acute ischemia  Independently interpreted by me contemporaneously with treatment        ED Course  ED Course as of 11/16/24 1353   Sat Nov 16, 2024   1332 Patient with H&P as above, presentation consistent with new onset CHF versus bilateral pneumonia.  On exam he does have inspiratory crackles, heart sounds are irregularly irregular, has bilateral lower extremity edema.  EKG is without acute ischemia, read as sinus rhythm with PACs but I have suspicion for atrial fibrillation due to variability of heart rate on the monitor.  Labs and x-ray confirmed new onset CHF.  BNP is 2600 high-sensitivity troponin is 56.  X-ray is with edema.  Discussed with nurse practitioner for [facility], stepping patient to Dr. De La Cruz's service [RO]      ED Course User Index  [RO] Sathya Weinstein MD                                           Medical Decision Making  Problems Addressed:  Acute congestive heart failure, unspecified heart failure type: complicated acute illness or injury  Elevated troponin: complicated acute illness or injury    Amount and/or Complexity of Data Reviewed  Labs: ordered. Decision-making details documented in ED Course.  Radiology: ordered. Decision-making details documented in ED Course.  ECG/medicine tests: ordered and independent interpretation performed. Decision-making details documented in ED Course.    Risk  Prescription drug management.  Decision regarding hospitalization.        Final diagnoses:   Acute congestive heart failure, unspecified heart failure type   Elevated troponin    Atrial fibrillation, unspecified type       ED Disposition  ED Disposition       ED Disposition   Decision to Admit    Condition   --    Comment   Level of Care: Telemetry [5]   Admitting Physician: OMID ALEJANDRO [[id number]]   Patient Class: Inpatient [101]                 No follow-up provider specified.       Medication List      No changes were made to your prescriptions during this visit.           Electronically signed by Sathya Weinstein MD at 11/16/24 4433       Marlyn Schuster RN at 11/16/24 1336          [facility] contacted for admission.    Electronically signed by Marlyn Schuster RN at 11/16/24 1815

## 2024-11-20 ENCOUNTER — APPOINTMENT (OUTPATIENT)
Dept: GENERAL RADIOLOGY | Facility: HOSPITAL | Age: 89
End: 2024-11-20
Payer: MEDICARE

## 2024-11-20 LAB
ANION GAP SERPL CALCULATED.3IONS-SCNC: 7.3 MMOL/L (ref 5–15)
BASOPHILS # BLD AUTO: 0.01 10*3/MM3 (ref 0–0.2)
BASOPHILS NFR BLD AUTO: 0.1 % (ref 0–1.5)
BUN SERPL-MCNC: 28 MG/DL (ref 8–23)
BUN/CREAT SERPL: 32.9 (ref 7–25)
CALCIUM SPEC-SCNC: 8.9 MG/DL (ref 8.2–9.6)
CHLORIDE SERPL-SCNC: 101 MMOL/L (ref 98–107)
CO2 SERPL-SCNC: 28.7 MMOL/L (ref 22–29)
CREAT SERPL-MCNC: 0.85 MG/DL (ref 0.76–1.27)
DEPRECATED RDW RBC AUTO: 53.3 FL (ref 37–54)
EGFRCR SERPLBLD CKD-EPI 2021: 80.5 ML/MIN/1.73
EOSINOPHIL # BLD AUTO: 0.03 10*3/MM3 (ref 0–0.4)
EOSINOPHIL NFR BLD AUTO: 0.3 % (ref 0.3–6.2)
ERYTHROCYTE [DISTWIDTH] IN BLOOD BY AUTOMATED COUNT: 14.2 % (ref 12.3–15.4)
GLUCOSE SERPL-MCNC: 91 MG/DL (ref 65–99)
HCT VFR BLD AUTO: 35.5 % (ref 37.5–51)
HGB BLD-MCNC: 11.3 G/DL (ref 13–17.7)
IMM GRANULOCYTES # BLD AUTO: 0.05 10*3/MM3 (ref 0–0.05)
IMM GRANULOCYTES NFR BLD AUTO: 0.5 % (ref 0–0.5)
LYMPHOCYTES # BLD AUTO: 1.88 10*3/MM3 (ref 0.7–3.1)
LYMPHOCYTES NFR BLD AUTO: 18.7 % (ref 19.6–45.3)
MCH RBC QN AUTO: 31.9 PG (ref 26.6–33)
MCHC RBC AUTO-ENTMCNC: 31.8 G/DL (ref 31.5–35.7)
MCV RBC AUTO: 100.3 FL (ref 79–97)
MONOCYTES # BLD AUTO: 1.46 10*3/MM3 (ref 0.1–0.9)
MONOCYTES NFR BLD AUTO: 14.5 % (ref 5–12)
NEUTROPHILS NFR BLD AUTO: 6.63 10*3/MM3 (ref 1.7–7)
NEUTROPHILS NFR BLD AUTO: 65.9 % (ref 42.7–76)
NRBC BLD AUTO-RTO: 0 /100 WBC (ref 0–0.2)
PLATELET # BLD AUTO: 160 10*3/MM3 (ref 140–450)
PMV BLD AUTO: 10.5 FL (ref 6–12)
POTASSIUM SERPL-SCNC: 4.6 MMOL/L (ref 3.5–5.2)
RBC # BLD AUTO: 3.54 10*6/MM3 (ref 4.14–5.8)
SODIUM SERPL-SCNC: 137 MMOL/L (ref 136–145)
WBC NRBC COR # BLD AUTO: 10.06 10*3/MM3 (ref 3.4–10.8)

## 2024-11-20 PROCEDURE — 94761 N-INVAS EAR/PLS OXIMETRY MLT: CPT

## 2024-11-20 PROCEDURE — 25010000002 FUROSEMIDE PER 20 MG: Performed by: NURSE PRACTITIONER

## 2024-11-20 PROCEDURE — 94799 UNLISTED PULMONARY SVC/PX: CPT

## 2024-11-20 PROCEDURE — 92611 MOTION FLUOROSCOPY/SWALLOW: CPT

## 2024-11-20 PROCEDURE — 74230 X-RAY XM SWLNG FUNCJ C+: CPT

## 2024-11-20 PROCEDURE — 97110 THERAPEUTIC EXERCISES: CPT

## 2024-11-20 PROCEDURE — 99232 SBSQ HOSP IP/OBS MODERATE 35: CPT | Performed by: NURSE PRACTITIONER

## 2024-11-20 PROCEDURE — 25010000002 AMPICILLIN-SULBACTAM PER 1.5 G: Performed by: INTERNAL MEDICINE

## 2024-11-20 PROCEDURE — 25010000002 ENOXAPARIN PER 10 MG: Performed by: FAMILY MEDICINE

## 2024-11-20 PROCEDURE — 97535 SELF CARE MNGMENT TRAINING: CPT

## 2024-11-20 PROCEDURE — 25010000002 METHYLPREDNISOLONE PER 40 MG: Performed by: INTERNAL MEDICINE

## 2024-11-20 PROCEDURE — 97112 NEUROMUSCULAR REEDUCATION: CPT

## 2024-11-20 PROCEDURE — 80048 BASIC METABOLIC PNL TOTAL CA: CPT | Performed by: INTERNAL MEDICINE

## 2024-11-20 PROCEDURE — 97530 THERAPEUTIC ACTIVITIES: CPT

## 2024-11-20 PROCEDURE — 85025 COMPLETE CBC W/AUTO DIFF WBC: CPT | Performed by: INTERNAL MEDICINE

## 2024-11-20 PROCEDURE — 94664 DEMO&/EVAL PT USE INHALER: CPT

## 2024-11-20 RX ORDER — HYDROCHLOROTHIAZIDE 12.5 MG/1
25 TABLET ORAL DAILY
Status: DISCONTINUED | OUTPATIENT
Start: 2024-11-21 | End: 2024-11-22 | Stop reason: HOSPADM

## 2024-11-20 RX ORDER — FUROSEMIDE 10 MG/ML
20 INJECTION INTRAMUSCULAR; INTRAVENOUS ONCE
Status: COMPLETED | OUTPATIENT
Start: 2024-11-20 | End: 2024-11-20

## 2024-11-20 RX ADMIN — GUAIFENESIN 1200 MG: 600 TABLET, EXTENDED RELEASE ORAL at 09:54

## 2024-11-20 RX ADMIN — METOPROLOL SUCCINATE 12.5 MG: 25 TABLET, EXTENDED RELEASE ORAL at 09:55

## 2024-11-20 RX ADMIN — PRIMIDONE 250 MG: 250 TABLET ORAL at 09:55

## 2024-11-20 RX ADMIN — BARIUM SULFATE 50 ML: 400 SUSPENSION ORAL at 10:39

## 2024-11-20 RX ADMIN — TIMOLOL MALEATE 1 DROP: 5 SOLUTION/ DROPS OPHTHALMIC at 20:38

## 2024-11-20 RX ADMIN — IPRATROPIUM BROMIDE AND ALBUTEROL SULFATE 3 ML: .5; 3 SOLUTION RESPIRATORY (INHALATION) at 19:28

## 2024-11-20 RX ADMIN — METHYLPREDNISOLONE SODIUM SUCCINATE 20 MG: 40 INJECTION, POWDER, FOR SOLUTION INTRAMUSCULAR; INTRAVENOUS at 09:54

## 2024-11-20 RX ADMIN — AMLODIPINE BESYLATE 5 MG: 5 TABLET ORAL at 09:55

## 2024-11-20 RX ADMIN — LATANOPROST 1 DROP: 50 SOLUTION/ DROPS OPHTHALMIC at 20:38

## 2024-11-20 RX ADMIN — CLONIDINE HYDROCHLORIDE 0.1 MG: 0.1 TABLET ORAL at 09:55

## 2024-11-20 RX ADMIN — GUAIFENESIN 1200 MG: 600 TABLET, EXTENDED RELEASE ORAL at 20:38

## 2024-11-20 RX ADMIN — CLONIDINE HYDROCHLORIDE 0.1 MG: 0.1 TABLET ORAL at 20:38

## 2024-11-20 RX ADMIN — Medication 10 ML: at 20:37

## 2024-11-20 RX ADMIN — AMPICILLIN SODIUM AND SULBACTAM SODIUM 3 G: 2; 1 INJECTION, POWDER, FOR SOLUTION INTRAMUSCULAR; INTRAVENOUS at 04:58

## 2024-11-20 RX ADMIN — PRIMIDONE 250 MG: 250 TABLET ORAL at 20:38

## 2024-11-20 RX ADMIN — AMPICILLIN SODIUM AND SULBACTAM SODIUM 3 G: 2; 1 INJECTION, POWDER, FOR SOLUTION INTRAMUSCULAR; INTRAVENOUS at 09:54

## 2024-11-20 RX ADMIN — BRIMONIDINE TARTRATE 1 DROP: 2 SOLUTION/ DROPS OPHTHALMIC at 09:54

## 2024-11-20 RX ADMIN — Medication 10 ML: at 09:55

## 2024-11-20 RX ADMIN — IPRATROPIUM BROMIDE AND ALBUTEROL SULFATE 3 ML: .5; 3 SOLUTION RESPIRATORY (INHALATION) at 07:30

## 2024-11-20 RX ADMIN — BRIMONIDINE TARTRATE 1 DROP: 2 SOLUTION/ DROPS OPHTHALMIC at 20:38

## 2024-11-20 RX ADMIN — TIMOLOL MALEATE 1 DROP: 5 SOLUTION/ DROPS OPHTHALMIC at 09:54

## 2024-11-20 RX ADMIN — AMPICILLIN SODIUM AND SULBACTAM SODIUM 3 G: 2; 1 INJECTION, POWDER, FOR SOLUTION INTRAMUSCULAR; INTRAVENOUS at 19:05

## 2024-11-20 RX ADMIN — ENOXAPARIN SODIUM 40 MG: 100 INJECTION SUBCUTANEOUS at 19:04

## 2024-11-20 RX ADMIN — PRIMIDONE 250 MG: 250 TABLET ORAL at 19:05

## 2024-11-20 RX ADMIN — FUROSEMIDE 20 MG: 10 INJECTION, SOLUTION INTRAMUSCULAR; INTRAVENOUS at 19:05

## 2024-11-20 NOTE — ACP (ADVANCE CARE PLANNING)
Attempted visit this morning. Patient not A&O, no family present. RN told me she would contact me when family arrives. RN informed me that family reports no ACP need and to close out consult.    Chaplain Chirag Slaughter

## 2024-11-20 NOTE — THERAPY TREATMENT NOTE
"Subjective: Pt agreeable to therapeutic plan of care. Pt reports fatigue after being up in recliner for several hours.    Objective:     Bed mobility - Max-A and Assist x 2 with repositioning sheet in bed.    Therapeutic Exercise - 10 Reps B LE AROM lying supine: ankle pumps, SLR, alternating marches    Vitals: WNL    Pain: 0 VAS   Location: N/A  Intervention for pain: N/A    Education: Provided education on the importance of mobility in the acute care setting, Verbal/Tactile Cues, Energy conservation strategies, and HEP    Assessment: Sathya Flores presents with increased fatigue and enhanced Parkinsonian symptoms this date. Pt with soft and slow speech, bradykinesia with ther ex, and rigidity BLE. Declined transfers this date as pt recently returned to bed, so instructed through bed exercises and repositioned in chair position in bed. Pt would potentially benefit from neurology consult as pt appears limited due to clinical presentation. Tolerating session today without incident. Will continue to follow and progress as tolerated.     Plan/Recommendations:   If medically appropriate, Moderate Intensity Therapy recommended post-acute care. This is recommended as therapy feels the patient would require 3-4 days per week and wouldn't tolerate \"3 hour daily\" rehab intensity. SNF would be the preferred choice. If the patient does not agree to SNF, arrange HH or OP depending on home bound status. If patient is medically complex, consider LTACH. Pt requires no DME at discharge.     Pt desires Skilled Rehab placement at discharge. Pt cooperative; agreeable to therapeutic recommendations and plan of care.         Basic Mobility 6-click:  Rollin = Total, A lot = 2, A little = 3; 4 = None  Supine>Sit:   1 = Total, A lot = 2, A little = 3; 4 = None   Sit>Stand with arms:  1 = Total, A lot = 2, A little = 3; 4 = None  Bed>Chair:   1 = Total, A lot = 2, A little = 3; 4 = None  Ambulate in room:  1 = Total, A lot = 2, " A little = 3; 4 = None  3-5 Steps with railin = Total, A lot = 2, A little = 3; 4 = None  Score: 11    Modified Irina: N/A = No pre-op stroke/TIA    Post-Tx Position: In chair position in bed, alarm activated, call light within reach, friend in room.  PPE: gloves, surgical mask, and gown    Therapy Charges for Today       Code Description Service Date Service Provider Modifiers Qty    96750918946 HC PT THERAPEUTIC ACT EA 15 MIN 2024 Diane Miranda, PT GP 1    00962127883 HC PT THER PROC EA 15 MIN 2024 Diane Miranda, PT GP 1    [id number]  PT NEUROMUSC RE EDUCATION EA 15 MIN 2024 Diane Miranda, PT GP 1    [id number]  PT THERAPEUTIC ACT EA 15 MIN 2024 Diane Miranda, PT GP 1    10212589718 HC PT THER PROC EA 15 MIN 2024 Diane Miranda, PT GP 1           PT Charges       Row Name 24 1449             Time Calculation    Start Time 1337  -OD      Stop Time 1353  -OD      Time Calculation (min) 16 min  -OD      PT Received On 24  -OD      PT - Next Appointment 24  -OD         Time Calculation- PT    Total Timed Code Minutes- PT 16 minute(s)  -OD                User Key  (r) = Recorded By, (t) = Taken By, (c) = Cosigned By      Initials Name Provider Type    OD Diane Miranda, PT Physical Therapist

## 2024-11-20 NOTE — PROGRESS NOTES
[facility] CARDIOLOGY  [facility]        LOS:  LOS: 4 days   Patient Name: Sathya Flores  Age/Sex: 89 y.o. male  : 1930  MRN: [id number]    Day of Service: 24   Length of Stay: 4  Encounter Provider: BEBO Hess  Place of Service: [facility] CARDIOLOGY  Patient Care Team:  Provider, No Known as PCP - Rogelio Khan MD as PCP - Family Medicine    Subjective:     Chief Complaint:  F/U HF    Subjective:   Patient reports breathing is fair.  Not coughing during exam today.    Current Medications:   Scheduled Meds:amLODIPine, 5 mg, Oral, Daily  ampicillin-sulbactam, 3 g, Intravenous, Q6H  brimonidine, 1 drop, Right Eye, Q12H   And  timolol, 1 drop, Right Eye, Q12H  cloNIDine, 0.1 mg, Oral, BID  enoxaparin, 40 mg, Subcutaneous, Q24H  guaiFENesin, 1,200 mg, Oral, Q12H  [START ON 2024] hydroCHLOROthiazide, 25 mg, Oral, Daily  ipratropium-albuterol, 3 mL, Nebulization, TID - RT  latanoprost, 1 drop, Right Eye, Nightly  methylPREDNISolone sodium succinate, 20 mg, Intravenous, Daily  metoprolol succinate XL, 12.5 mg, Oral, Q24H  pantoprazole, 40 mg, Oral, Q AM  primidone, 250 mg, Oral, TID  sodium chloride, 10 mL, Intravenous, Q12H      Continuous Infusions:Pharmacy to Dose enoxaparin (LOVENOX),         Allergies:  No Known Allergies    ROS    Objective:     Temp:  [97.5 °F (36.4 °C)-100.4 °F (38 °C)] 100.2 °F (37.9 °C)  Heart Rate:  [] 92  Resp:  [20-32] 24  BP: ()/(47-68) 97/47     Intake/Output Summary (Last 24 hours) at 2024 1608  Last data filed at 2024 0400  Gross per 24 hour   Intake 0 ml   Output 1600 ml   Net -1600 ml     Body mass index is 29.36 kg/m².      24  1630 24  0405 24  0400   Weight: 79.7 kg (175 lb 11.3 oz) 78.2 kg (172 lb 6.4 oz) 82.5 kg (181 lb 14.1 oz)         Physical Exam:  Neuro:  CV:  Resp:  GI:  Ext:  Tele: AAOx3, no gross deficits  S1S2 RRR, no  "murmur, +JVD  shallow, coarse  BS+, abd soft  Pedal pulses palp, trace BLE edema  SR with PACs                                                   Lab Review:   Results from last 7 days   Lab Units 11/20/24  0305 11/19/24  0446 11/16/24  1815 11/16/24  1238   SODIUM mmol/L 137 137   < > 136   POTASSIUM mmol/L 4.6 4.4   < > 4.4   CHLORIDE mmol/L 101 101   < > 101   CO2 mmol/L 28.7 26.4   < > 28.4   BUN mg/dL 28* 32*   < > 29*   CREATININE mg/dL 0.85 0.88   < > 0.94   GLUCOSE mg/dL 91 104*   < > 108*   CALCIUM mg/dL 8.9 8.6   < > 8.9   AST (SGOT) U/L  --   --   --  18   ALT (SGPT) U/L  --   --   --  21    < > = values in this interval not displayed.     Results from last 7 days   Lab Units 11/16/24  1409 11/16/24  1238   HSTROP T ng/L 54* 56*     Results from last 7 days   Lab Units 11/20/24  0305 11/19/24  0446   WBC 10*3/mm3 10.06 13.12*   HEMOGLOBIN g/dL 11.3* 11.4*   HEMATOCRIT % 35.5* 34.8*   PLATELETS 10*3/mm3 160 149         Results from last 7 days   Lab Units 11/19/24  0446 11/16/24  1815   MAGNESIUM mg/dL 2.0 1.9           Invalid input(s): \"LDLCALC\"  Results from last 7 days   Lab Units 11/16/24  1238   PROBNP pg/mL 2,608.0*     Results from last 7 days   Lab Units 11/16/24  1815   TSH uIU/mL 0.992       Recent Radiology:  Imaging Results (Most Recent)       Procedure Component Value Units Date/Time    FL Video Swallow With Speech Single Contrast [[id number]] Resulted: 11/20/24 1040     Updated: 11/20/24 1040    Narrative:      This procedure was auto-finalized with no dictation required.    XR Chest 1 View [[id number]] Collected: 11/19/24 0952     Updated: 11/19/24 0956    Narrative:      XR CHEST 1 VW    Date of Exam: 11/19/2024 9:46 AM EST    Indication: shortness of breath    Comparison: 11/16/2024 chest radiograph, 11/17/2024 chest CT    Findings:  Mild scattered patchy areas of airspace disease concerning for pneumonia. Underlying emphysema with chronic interstitial lung disease similar to prior studies. " No pneumothorax. Severe bilateral glenohumeral osteoarthritis. Heart size top normal,   exaggerated by technique. Aortic arch atherosclerosis. No pneumothorax or pleural effusion.      Impression:      Impression:  1. Mild scattered patchy areas of airspace disease concerning for pneumonia.  2. Underlying emphysema and chronic interstitial lung disease.        Electronically Signed: Landen Mock MD    11/19/2024 9:54 AM EST    Workstation ID: [id number]    CT Chest Without Contrast Diagnostic [[id number]] Collected: 11/17/24 2013     Updated: 11/17/24 2020    Narrative:      CT CHEST WO CONTRAST DIAGNOSTIC    Date of Exam: 11/17/2024 8:00 PM EST    Indication: pulm edema vs infiltrate.    Comparison: Chest CT dated 8/18/2021    Technique: Axial CT images were obtained of the chest without contrast administration.  Sagittal and coronal reconstructions were performed.  Automated exposure control and iterative reconstruction methods were used.      FINDINGS:    Thoracic inlet: Unremarkable.    Great vessels: Atherosclerotic plaque is seen within the thoracic aorta and proximal arch vessels.    Mediastinum/Anna: Scattered subcentimeter mediastinal lymph nodes are noted, nonspecific. The esophagus appears unremarkable.    Lung parenchyma: Motion artifact limits evaluation of the lungs. Paraseptal emphysematous changes noted within the bilateral upper lobes. Mild hypoventilatory changes within the bilateral lung bases. Mild bilateral lower lobe infiltrates cannot be   excluded.    Trachea and airways: The trachea and central airways appear unremarkable.    Pleural space: No significant pleural effusion or pneumothorax.    Heart and pericardium: Coronary artery calcifications are present. Otherwise, the heart and pericardium appear unremarkable.    Chest wall: No acute or suspicious osseous or soft tissue lesion is identified.    Upper abdomen: No acute abnormality is identified within the visualized upper abdomen.       Impression:      1.Examination is limited due to motion artifact.  2.Mild bilateral lower lobe infiltrates cannot be excluded.  3.Paraseptal emphysematous changes within the bilateral upper lobes.  4.Additional findings as detailed above.        Electronically Signed: Dom Noel MD    11/17/2024 8:18 PM EST    Workstation ID: [id number]    XR Chest 1 View [[id number]] Collected: 11/16/24 1220     Updated: 11/16/24 1223    Narrative:      XR CHEST 1 VW    Date of Exam: 11/16/2024 12:07 PM EST    Indication: cough    Comparison: Chest x-ray dated 6/17/2020    Findings:  Cardiac silhouette is enlarged. Vague bilateral lung opacities may represent edema or mild infiltrates. No significant pleural effusion or pneumothorax. No acute osseous lesion is seen. There are senescent changes of the aorta and skeletal structures.      Impression:      Impression:  1.Findings compatible with mild CHF. Concomitant mild infiltrates cannot be excluded.      Electronically Signed: Dom Noel MD    11/16/2024 12:21 PM EST    Workstation ID: [id number]            ECHOCARDIOGRAM:    Results for orders placed during the hospital encounter of 11/16/24    Adult Transthoracic Echo Complete W/ Cont if Necessary Per Protocol    Interpretation Summary    Left ventricular systolic function is normal. Calculated left ventricular EF = 64.3%    Left ventricular wall thickness is consistent with mild to moderate concentric hypertrophy.    Left ventricular diastolic function is consistent with (grade I) impaired relaxation.    The left atrial cavity is mildly dilated.    There is mild calcification of the aortic valve.    Estimated right ventricular systolic pressure from tricuspid regurgitation is markedly elevated (>55 mmHg).    Transthoracic echocardiography with EF of 64%.  Mild MR and no effusion    Electronically signed by Faustino García MD, 11/17/24, 2:35 PM EST.        I reviewed the patient's new clinical  results.    EKG:      Assessment:       CHF (congestive heart failure)    1) acute on chronic diastolic heart failure  - CT chest w/o showed vascular congestion  -TSH WNL  - 2D echo 11/2024 shows an EF of 64% with grade 1 diastolic dysfunction and mild MR.  - s/p IV lasix     2) Acute respiratory failure / parainfluenza virus / PNA  - pulmonary following  - repeat CXR suggests PNA     3) HTN     4) UTI    Plan:   Daily weights trended up today with JVD on exam.  Will give a dose of IV lasix 20 mg x one.  Repeat BNP.  Tele shows SR with frequent PACs.  Continue on beta blocker.        Electronically signed by BEBO Hess, 11/20/24, 4:13 PM EST.

## 2024-11-20 NOTE — THERAPY TREATMENT NOTE
"Subjective: Pt agreeable to therapeutic plan of care.  Cognition: oriented to Person    Objective:     Precautions - High Fall Risk    Bed Mobility: Assist x 2 and Dependent scoot HOB  Functional Transfers: N/A or Not attempted.  Functional Ambulation: N/A or Not attempted.    Grooming: Max-A  ADL Position: supine, HOB elevated  ADL Comments: face hygiene and apply chap stick    Vitals: Desaturates with min exertion, however poor pleth    Pain: 0 VAS  Location: N/A  Interventions for pain: N/A  Education: Provided education on the importance of mobility in the acute care setting, Verbal/Tactile Cues, and ADL training    Assessment: Sathya Flores presents with ADL impairments affecting function including balance, cognition, coordination, endurance / activity tolerance, grasp, motor control, motor planning, and strength. Pt lethargic this date, oriented to name and . Pt desats with min exertion, though poor pleth. Pt requires max assist to complete face hygiene and apply chap stick with RUE. Tremors observed in BUE when completing BUE stretching/ADLs. Demonstrated functioning below baseline abilities indicate the need for continued skilled intervention while inpatient. Tolerating session today without incident. Will continue to follow and progress as tolerated.     Plan/Recommendations:   Moderate Intensity Therapy recommended post-acute care. This is recommended as therapy feels the patient would require 3-4 days per week and wouldn't tolerate \"3 hour daily\" rehab intensity. SNF would be the preferred choice. If the patient does not agree to SNF, arrange HH or OP depending on home bound status. If patient is medically complex, consider LTACH.    Pt desires Skilled Rehab placement at discharge. Pt cooperative; agreeable to therapeutic recommendations and plan of care.     Modified Irina: N/A = No pre-op stroke/TIA    Post-Tx Position: Supine with HOB Elevated, Alarms activated, and Call light and personal items " within reach in chair position, family friend preset in room  PPE: gloves, surgical mask, and gown    Therapy Charges for Today       Code Description Service Date Service Provider Modifiers Qty    60757204844 HC OT SELF CARE/MGMT/TRAIN EA 15 MIN 11/20/2024 Ean Kumar OT GO 1    80703114490 HC OT NEUROMUSC RE EDUCATION EA 15 MIN 11/20/2024 Ean Kumar OT GO 1           Time Calculation- OT       Row Name 11/20/24 1425             Time Calculation- OT    OT Start Time 1337  -SP      OT Stop Time 1354  -SP      OT Time Calculation (min) 17 min  -SP      Total Timed Code Minutes- OT 17 minute(s)  -SP      OT Received On 11/20/24  -SP      OT - Next Appointment 11/21/24  -SP                User Key  (r) = Recorded By, (t) = Taken By, (c) = Cosigned By      Initials Name Provider Type    SP Ean Kumar, OT Occupational Therapist

## 2024-11-20 NOTE — CASE MANAGEMENT/SOCIAL WORK
Continued Stay Note  [facility]     Patient Name: Sathya Flores  MRN: [id number]  Today's Date: 11/20/2024    Admit Date: 11/16/2024    Plan: [facility] skilled (accepted), PASRR approved, Precert approved Valid 11/22-11/26   Discharge Plan       Row Name 11/20/24 1554       Plan    Plan [facility] skilled (accepted), PASRR approved, Precert approved Valid 11/22-11/26    Plan Comments CM made aware that precert approved Valid 11/22-11/26        Elza Esquivel RN     [facility]  Office: [phone]  Cell: [phone]  Fax # [phone]

## 2024-11-20 NOTE — PROGRESS NOTES
LOS: 4 days   Patient Care Team:  Provider, No Known as PCP - General Grullon, Rogelio SCRUGGS MD as PCP - Family Medicine    Subjective     Interval History: Improving slightly    Patient Complaints: No specific complaints, up in chair, able to answer questions appropriately    History taken from: patient    Review of Systems   Constitutional:  Positive for activity change, appetite change and fatigue. Negative for diaphoresis and fever.   HENT:  Negative for facial swelling.    Eyes:  Negative for visual disturbance.   Respiratory:  Positive for cough. Negative for shortness of breath, wheezing and stridor.    Cardiovascular:  Negative for chest pain, palpitations and leg swelling.   Gastrointestinal:  Negative for abdominal pain, constipation, diarrhea, nausea and vomiting.   Endocrine: Negative for polyuria.   Genitourinary:  Negative for dysuria.   Musculoskeletal:  Positive for arthralgias and gait problem.   Neurological:  Negative for headaches.   Psychiatric/Behavioral:  Negative for confusion.            Objective     Vital Signs  Temp:  [97.5 °F (36.4 °C)-100.4 °F (38 °C)] 97.5 °F (36.4 °C)  Heart Rate:  [] 84  Resp:  [20-32] 22  BP: (111-127)/(58-68) 126/58    Physical Exam:     General Appearance:    Alert, cooperative, in no acute distress, frail   Head:    Normocephalic, without obvious abnormality, atraumatic   Eyes:            Lids and lashes normal, conjunctivae and sclerae normal, no   icterus, no pallor, corneas clear, PERRLA   Ears:    Ears appear intact with no abnormalities noted   Throat:   No oral lesions, no thrush, oral mucosa moist   Neck:   No adenopathy, supple, trachea midline, no thyromegaly, no   carotid bruit, no JVD   Lungs:     Scattered rhonchi    Heart:    Regular rhythm and normal rate, normal S1 and S2, no            murmur, no gallop, no rub, no click   Chest Wall:    No abnormalities observed   Abdomen:     Normal bowel sounds, no masses, no organomegaly, soft         Non-tender non-distended, no guarding,   Extremities:   Moves all extremities well, no edema, no cyanosis, no             Redness   Pulses:   Pulses palpable and equal bilaterally   Skin:   Scattered scabbed lesions on face, scalp, ears, chest wall, arms   Lymph nodes:   No palpable adenopathy   Neurologic:   No localizing deficits, gait not assessed.        Results Review:    Lab Results (last 24 hours)       Procedure Component Value Units Date/Time    Basic Metabolic Panel [[id number]]  (Abnormal) Collected: 11/20/24 0305    Specimen: Blood Updated: 11/20/24 0402     Glucose 91 mg/dL      BUN 28 mg/dL      Creatinine 0.85 mg/dL      Sodium 137 mmol/L      Potassium 4.6 mmol/L      Comment: Specimen hemolyzed.  Result may be falsely elevated.        Chloride 101 mmol/L      CO2 28.7 mmol/L      Calcium 8.9 mg/dL      BUN/Creatinine Ratio 32.9     Anion Gap 7.3 mmol/L      eGFR 80.5 mL/min/1.73     Narrative:      GFR Normal >60  Chronic Kidney Disease <60  Kidney Failure <15    The GFR formula is only valid for adults with stable renal function between ages 18 and 70.    CBC & Differential [[id number]]  (Abnormal) Collected: 11/20/24 0305    Specimen: Blood Updated: 11/20/24 0324    Narrative:      The following orders were created for panel order CBC & Differential.  Procedure                               Abnormality         Status                     ---------                               -----------         ------                     CBC Auto Differential[[id number]]        Abnormal            Final result                 Please view results for these tests on the individual orders.    CBC Auto Differential [[id number]]  (Abnormal) Collected: 11/20/24 0305    Specimen: Blood Updated: 11/20/24 0324     WBC 10.06 10*3/mm3      RBC 3.54 10*6/mm3      Hemoglobin 11.3 g/dL      Hematocrit 35.5 %      .3 fL      MCH 31.9 pg      MCHC 31.8 g/dL      RDW 14.2 %      RDW-SD 53.3 fl      MPV 10.5 fL      Platelets  160 10*3/mm3      Neutrophil % 65.9 %      Lymphocyte % 18.7 %      Monocyte % 14.5 %      Eosinophil % 0.3 %      Basophil % 0.1 %      Immature Grans % 0.5 %      Neutrophils, Absolute 6.63 10*3/mm3      Lymphocytes, Absolute 1.88 10*3/mm3      Monocytes, Absolute 1.46 10*3/mm3      Eosinophils, Absolute 0.03 10*3/mm3      Basophils, Absolute 0.01 10*3/mm3      Immature Grans, Absolute 0.05 10*3/mm3      nRBC 0.0 /100 WBC              Imaging Results (Last 24 Hours)       Procedure Component Value Units Date/Time    FL Video Swallow With Speech Single Contrast [[id number]] Resulted: 11/20/24 1040     Updated: 11/20/24 1040    Narrative:      This procedure was auto-finalized with no dictation required.                 I reviewed the patient's new clinical results.    Medication Review:   Scheduled Meds:amLODIPine, 5 mg, Oral, Daily  ampicillin-sulbactam, 3 g, Intravenous, Q6H  brimonidine, 1 drop, Right Eye, Q12H   And  timolol, 1 drop, Right Eye, Q12H  cloNIDine, 0.1 mg, Oral, BID  enoxaparin, 40 mg, Subcutaneous, Q24H  guaiFENesin, 1,200 mg, Oral, Q12H  ipratropium-albuterol, 3 mL, Nebulization, TID - RT  latanoprost, 1 drop, Right Eye, Nightly  methylPREDNISolone sodium succinate, 20 mg, Intravenous, Daily  metoprolol succinate XL, 12.5 mg, Oral, Q24H  pantoprazole, 40 mg, Oral, Q AM  primidone, 250 mg, Oral, TID  sodium chloride, 10 mL, Intravenous, Q12H      Continuous Infusions:Pharmacy to Dose enoxaparin (LOVENOX),       PRN Meds:.  acetaminophen **OR** acetaminophen **OR** acetaminophen    senna-docusate sodium **AND** polyethylene glycol **AND** bisacodyl **AND** bisacodyl    Calcium Replacement - Follow Nurse / BPA Driven Protocol    hydrALAZINE    Magnesium Standard Dose Replacement - Follow Nurse / BPA Driven Protocol    ondansetron ODT **OR** ondansetron    Pharmacy to Dose enoxaparin (LOVENOX)    Phosphorus Replacement - Follow Nurse / BPA Driven Protocol    Potassium Replacement - Follow Nurse / BPA  Driven Protocol    [COMPLETED] Insert peripheral IV **AND** sodium chloride    sodium chloride    sodium chloride     Assessment & Plan       CHF (congestive heart failure)  - appears euvolemic and well compensated. Will restart HCTZ.    Parainfluenza - resolving    Aspiration pneumonia - WBC trending down after change to Unasyn - continue    Dysphagia/aspiration - swallow study findings noted; now on altered diet    Tremor - currently on primidone; PT noted shuffling gait; unclear if patient has been diagnosed with Parkinson's - will try to find records in office system.      Glaucoma - home meds  HTN - clonidine, metoprolol, amlodipine    Lovenox for dvt prophy    Plan for disposition:[facility]    Chacha Mccarty MD  11/20/24  15:02 EST

## 2024-11-20 NOTE — SIGNIFICANT NOTE
Case Management/Social Work    Patient Name:  Sathya Flores  YOB: 1930  MRN: [id number]  Admit Date:  11/16/2024 11/20/24 1551   Post Acute Pre-Cert Documentation   Request Submitted by Facility - Type: Hospital   Post-Acute Authorization Type Submitted: SNF   Date Post Acute Pre-Cert Inititated per Facility 11/20/24   Verification from Payer Yes   Date Post Acute Pre-Cert Completed 11/20/24   Accepting Facility [facility] ([place], [place]) Discharge Date Requested 11/22/24   All Clinicals Submitted? Yes   Had Accepting Facility at Time of Submission Yes   Response Received from Insurance? Approval   Response Communicated to:    Authorization Number: [id number]   Post Acute Pre-Cert Initiated Comment DEIDRA submitted SNF precert for [place] via [facility] Care portal. Portal auth ID [id number]. SNF precert auto approved. Valid 11/22-11/26. CM made aware.           Electronically signed by:  Tonny Mehta CMA  11/20/24 15:52 EST    Tonny Mehta  Case Management Associate  [address] [address]  P: [phone]  F: [phone]

## 2024-11-20 NOTE — PLAN OF CARE
"Assessment: Sathya Flores presents with ADL impairments affecting function including balance, cognition, coordination, endurance / activity tolerance, grasp, motor control, motor planning, and strength. Pt lethargic this date, oriented to name and . Pt desats with min exertion, though poor pleth. Pt requires max assist to complete face hygiene and apply chap stick with RUE. Tremors observed in BUE when completing BUE stretching/ADLs. Demonstrated functioning below baseline abilities indicate the need for continued skilled intervention while inpatient. Tolerating session today without incident. Will continue to follow and progress as tolerated.      Plan/Recommendations:   Moderate Intensity Therapy recommended post-acute care. This is recommended as therapy feels the patient would require 3-4 days per week and wouldn't tolerate \"3 hour daily\" rehab intensity. SNF would be the preferred choice. If the patient does not agree to SNF, arrange HH or OP depending on home bound status. If patient is medically complex, consider LTACH.  "

## 2024-11-20 NOTE — DISCHARGE PLACEMENT REQUEST
"Morgan Miranda (89 y.o. Male)       Date of Birth   05/07/1930    Social Security Number       Address   [address] [place] [address] [place] [address]    Home Phone   [phone]    MRN   [id number]       Pentecostal   Unknown    Marital Status                               Admission Date   11/16/24    Admission Type   Urgent    Admitting Provider   Nasreen De La Cruz MD    Attending Provider   Chacha Mccarty MD    Department, Room/Bed   [facility] 2F, 2210/1       Discharge Date       Discharge Disposition       Discharge Destination                                 Attending Provider: Chacha Mccarty MD    Allergies: No Known Allergies    Isolation: None   Infection: None   Code Status: No CPR    Ht: 167.6 cm (66\")   Wt: 82.5 kg (181 lb 14.1 oz)    Admission Cmt: None   Principal Problem: CHF (congestive heart failure) [I50.9]                   Active Insurance as of 11/16/2024       Primary Coverage       Payor Plan Insurance Group Employer/Plan Group    [employer] MEDICARE REPLACEMENT AARP MED ADV HMO [id number]       Payor Plan Address Payor Plan Phone Number Payor Plan Fax Number Effective Dates    [address]   1/1/2024 - None Entered    [facility] [id number]         Subscriber Name Subscriber Birth Date Member ID       MORGAN MIRANDA 5/7/1930 [id number]                     Emergency Contacts        (Rel.) Home Phone Work Phone Mobile Phone    FILEDSNEENA (Daughter) [phone] -- --    Rogelio Sandhu (Son) -- -- [phone]                 History & Physical        Candelaria Spears APRN at 11/17/24 1122       Attestation signed by Nasreen De La Cruz MD at 11/17/24 1314    I have reviewed this documentation and agree.                      Patient Care Team:  Provider, No Known as PCP - General  Rogelio Grullon MD as PCP - Family Medicine    Chief complaint Dyspnea    Subjective    Patient is a 89 y.o. male who presents with past medical history of hypertension, tremors, and glaucoma. He " presented from free standing ER due to edema and shortness of breath. Per note from ER he does not lay flat and stay in a recliner. Spoke with son in law and he states patient lives alone and this is his first hospitalization. He states that family check in with him three times daily and more. He has been eating and drinking ok, has had some urinary urgency, and he agrees he is more confused but that is not his norm. Patient at examination is [place] and mildly confused. IN the ED, D-dimer elevated, cxr with pulmonary edema, leukocytosis,elevated troponin, and UTI with parainfluenzal virus and MRSA +.     Review of Systems   Unable to perform ROS: Mental status change          History  History reviewed. No pertinent past medical history.  History reviewed. No pertinent surgical history.  History reviewed. No pertinent family history.  Social History     Tobacco Use    Smoking status: Former     Types: Cigarettes   Vaping Use    Vaping status: Never Used   Substance Use Topics    Alcohol use: Not Currently    Drug use: Never     Medications Prior to Admission   Medication Sig Dispense Refill Last Dose/Taking    acetaminophen (TYLENOL) 325 MG tablet Take 2 tablets by mouth Every 4 (Four) Hours As Needed for Mild Pain.       amLODIPine (NORVASC) 5 MG tablet Take 1 tablet by mouth Daily.       cloNIDine (CATAPRES) 0.1 MG tablet Take 1 tablet by mouth 2 (Two) Times a Day.       lisinopril-hydrochlorothiazide (PRINZIDE,ZESTORETIC) 20-12.5 MG per tablet Take 1 tablet by mouth Daily.       PENTOXIFYLLINE ER PO Take 400 mg by mouth 3 (Three) Times a Day.       primidone (MYSOLINE) 250 MG tablet Take 1 tablet by mouth 3 (Three) Times a Day.       vitamin B-6 (PYRIDOXINE) 50 MG tablet Take 0.5 tablets by mouth Daily.        Allergies:  Patient has no known allergies.    Objective    Vital Signs  Temp:  [98 °F (36.7 °C)-98.8 °F (37.1 °C)] 98.5 °F (36.9 °C)  Heart Rate:  [] 109  Resp:  [19-29] 22  BP: (112-156)/(50-89) 151/73        Physical Exam  Vitals reviewed.   Constitutional:       Appearance: He is ill-appearing.   HENT:      Head: Normocephalic and atraumatic.      Right Ear: External ear normal.      Left Ear: External ear normal.      Nose: Nose normal.      Mouth/Throat:      Mouth: Mucous membranes are dry.   Eyes:      General:         Right eye: No discharge.         Left eye: No discharge.   Cardiovascular:      Rate and Rhythm: Normal rate and regular rhythm.      Pulses: Normal pulses.      Heart sounds: Normal heart sounds.   Pulmonary:      Effort: Pulmonary effort is normal.      Breath sounds: Normal breath sounds.   Abdominal:      General: Bowel sounds are normal.   Musculoskeletal:         General: Normal range of motion.      Cervical back: Normal range of motion.   Skin:     General: Skin is warm.      Coloration: Skin is pale.   Neurological:      Mental Status: He is alert. He is disoriented.   Psychiatric:         Cognition and Memory: Cognition is impaired.          Results Review:     Imaging Results (Last 24 Hours)       Procedure Component Value Units Date/Time    XR Chest 1 View [[id number]] Collected: 11/16/24 1220     Updated: 11/16/24 1223    Narrative:      XR CHEST 1 VW    Date of Exam: 11/16/2024 12:07 PM EST    Indication: cough    Comparison: Chest x-ray dated 6/17/2020    Findings:  Cardiac silhouette is enlarged. Vague bilateral lung opacities may represent edema or mild infiltrates. No significant pleural effusion or pneumothorax. No acute osseous lesion is seen. There are senescent changes of the aorta and skeletal structures.      Impression:      Impression:  1.Findings compatible with mild CHF. Concomitant mild infiltrates cannot be excluded.      Electronically Signed: Dom Noel MD    11/16/2024 12:21 PM EST    Workstation ID: [id number]             Lab Results (last 24 hours)       Procedure Component Value Units Date/Time    CBC & Differential [[id number]]  (Abnormal) Collected:  11/17/24 0622    Specimen: Blood from Arm, Right Updated: 11/17/24 0658    Narrative:      The following orders were created for panel order CBC & Differential.  Procedure                               Abnormality         Status                     ---------                               -----------         ------                     CBC Auto Differential[[id number]]        Abnormal            Final result                 Please view results for these tests on the individual orders.    CBC Auto Differential [[id number]]  (Abnormal) Collected: 11/17/24 0622    Specimen: Blood from Arm, Right Updated: 11/17/24 0658     WBC 12.50 10*3/mm3      RBC 4.14 10*6/mm3      Hemoglobin 13.2 g/dL      Hematocrit 40.3 %      MCV 97.3 fL      MCH 31.9 pg      MCHC 32.8 g/dL      RDW 14.5 %      RDW-SD 51.8 fl      MPV 10.1 fL      Platelets 195 10*3/mm3      Neutrophil % 69.2 %      Lymphocyte % 15.4 %      Monocyte % 14.7 %      Eosinophil % 0.2 %      Basophil % 0.2 %      Immature Grans % 0.3 %      Neutrophils, Absolute 8.64 10*3/mm3      Lymphocytes, Absolute 1.93 10*3/mm3      Monocytes, Absolute 1.84 10*3/mm3      Eosinophils, Absolute 0.02 10*3/mm3      Basophils, Absolute 0.03 10*3/mm3      Immature Grans, Absolute 0.04 10*3/mm3      nRBC 0.0 /100 WBC     Basic Metabolic Panel [[id number]]  (Abnormal) Collected: 11/17/24 0622    Specimen: Blood from Arm, Right Updated: 11/17/24 0657     Glucose 100 mg/dL      BUN 23 mg/dL      Creatinine 0.87 mg/dL      Sodium 139 mmol/L      Potassium 4.3 mmol/L      Chloride 99 mmol/L      CO2 29.0 mmol/L      Calcium 9.4 mg/dL      BUN/Creatinine Ratio 26.4     Anion Gap 11.0 mmol/L      eGFR 80.0 mL/min/1.73     Narrative:      GFR Normal >60  Chronic Kidney Disease <60  Kidney Failure <15    The GFR formula is only valid for adults with stable renal function between ages 18 and 70.    Extra Tubes [[id number]] Collected: 11/17/24 0622    Specimen: Blood from Arm, Right Updated: 11/17/24  "0630    Narrative:      The following orders were created for panel order Extra Tubes.  Procedure                               Abnormality         Status                     ---------                               -----------         ------                     Light Blue Top[[id number]]                                   Final result                 Please view results for these tests on the individual orders.    Light Blue Top [[id number]] Collected: 11/17/24 0622    Specimen: Blood from Arm, Right Updated: 11/17/24 0630     Extra Tube Hold for add-ons.     Comment: Auto resulted       D-dimer, Quantitative [[id number]]  (Abnormal) Collected: 11/16/24 2204    Specimen: Blood from Arm, Right Updated: 11/16/24 2232     D-Dimer, Quantitative 2.86 MCGFEU/mL     Narrative:      According to the assay 's published package insert, a normal (<0.50 MCGFEU/mL) D-dimer result in conjunction with a non-high clinical probability assessment, excludes deep vein thrombosis (DVT) and pulmonary embolism (PE) with high sensitivity.    D-dimer values increase with age and this can make VTE exclusion of an older population difficult. To address this, the American College of Phys[employer], based on best available evidence and recent guidelines, recommends that clinicians use age-adjusted D-dimer thresholds in patients greater than 50 years of age with: a) a low probability of PE who do not meet all Pulmonary Embolism Rule Out Criteria, or b) in those with intermediate probability of PE.   The formula for an age-adjusted D-dimer cut-off is \"age/100\".  For example, a 60 year old patient would have an age-adjusted cut-off of 0.60 MCGFEU/mL and an 80 year old 0.80 MCGFEU/mL.    Basic Metabolic Panel [[id number]]  (Abnormal) Collected: 11/16/24 1815    Specimen: Blood Updated: 11/16/24 1902     Glucose 127 mg/dL      BUN 27 mg/dL      Creatinine 0.81 mg/dL      Sodium 138 mmol/L      Potassium 4.4 mmol/L      Comment: Specimen " hemolyzed.  Result may be falsely elevated.        Chloride 102 mmol/L      CO2 25.5 mmol/L      Calcium 9.0 mg/dL      BUN/Creatinine Ratio 33.3     Anion Gap 10.5 mmol/L      eGFR 81.7 mL/min/1.73     Narrative:      GFR Normal >60  Chronic Kidney Disease <60  Kidney Failure <15    The GFR formula is only valid for adults with stable renal function between ages 18 and 70.    Magnesium [[id number]]  (Normal) Collected: 11/16/24 1815    Specimen: Blood Updated: 11/16/24 1902     Magnesium 1.9 mg/dL     Iron Profile [[id number]]  (Abnormal) Collected: 11/16/24 1815    Specimen: Blood Updated: 11/16/24 1857     Iron 23 mcg/dL      Iron Saturation (TSAT) 10 %      Transferrin 161 mg/dL      TIBC 240 mcg/dL     TSH Rfx On Abnormal To Free T4 [[id number]]  (Normal) Collected: 11/16/24 1815    Specimen: Blood Updated: 11/16/24 1857     TSH 0.992 uIU/mL     CBC & Differential [[id number]]  (Abnormal) Collected: 11/16/24 1815    Specimen: Blood Updated: 11/16/24 1836    Narrative:      The following orders were created for panel order CBC & Differential.  Procedure                               Abnormality         Status                     ---------                               -----------         ------                     CBC Auto Differential[[id number]]        Abnormal            Final result                 Please view results for these tests on the individual orders.    CBC Auto Differential [[id number]]  (Abnormal) Collected: 11/16/24 1815    Specimen: Blood Updated: 11/16/24 1836     WBC 10.44 10*3/mm3      RBC 3.82 10*6/mm3      Hemoglobin 11.6 g/dL      Hematocrit 37.8 %      MCV 99.0 fL      MCH 30.4 pg      MCHC 30.7 g/dL      RDW 14.6 %      RDW-SD 53.0 fl      MPV 10.2 fL      Platelets 195 10*3/mm3      Neutrophil % 65.2 %      Lymphocyte % 19.4 %      Monocyte % 14.0 %      Eosinophil % 0.9 %      Basophil % 0.2 %      Immature Grans % 0.3 %      Neutrophils, Absolute 6.81 10*3/mm3      Lymphocytes, Absolute  2.03 10*3/mm3      Monocytes, Absolute 1.46 10*3/mm3      Eosinophils, Absolute 0.09 10*3/mm3      Basophils, Absolute 0.02 10*3/mm3      Immature Grans, Absolute 0.03 10*3/mm3      nRBC 0.0 /100 WBC     MRSA Screen, PCR (Inpatient) - Swab, Nares [[id number]]  (Abnormal) Collected: 11/16/24 1715    Specimen: Swab from Nares Updated: 11/16/24 1835     MRSA PCR MRSA Detected    Narrative:      The negative predictive value of this diagnostic test is high and should only be used to consider de-escalating anti-MRSA therapy. A positive result may indicate colonization with MRSA and must be correlated clinically.    Respiratory Panel PCR w/COVID-19(SARS-CoV-2) LEVI/NIKO/JOSE ARMANDO/PAD/COR/RYNE In-House, NP Swab in UTM/VTM, 2 HR TAT - Swab, Nasopharynx [[id number]]  (Abnormal) Collected: 11/16/24 1715    Specimen: Swab from Nasopharynx Updated: 11/16/24 1815     ADENOVIRUS, PCR Not Detected     Coronavirus 229E Not Detected     Coronavirus HKU1 Not Detected     Coronavirus NL63 Not Detected     Coronavirus OC43 Not Detected     COVID19 Not Detected     Human Metapneumovirus Not Detected     Human Rhinovirus/Enterovirus Not Detected     Influenza A PCR Not Detected     Influenza B PCR Not Detected     Parainfluenza Virus 1 Not Detected     Parainfluenza Virus 2 Not Detected     Parainfluenza Virus 3 Not Detected     Parainfluenza Virus 4 Detected     RSV, PCR Not Detected     Bordetella pertussis pcr Not Detected     Bordetella parapertussis PCR Not Detected     Chlamydophila pneumoniae PCR Not Detected     Mycoplasma pneumo by PCR Not Detected    Narrative:      In the setting of a positive respiratory panel with a viral infection PLUS a negative procalcitonin without other underlying concern for bacterial infection, consider observing off antibiotics or discontinuation of antibiotics and continue supportive care. If the respiratory panel is positive for atypical bacterial infection (Bordetella pertussis, Chlamydophila pneumoniae, or  Mycoplasma pneumoniae), consider antibiotic de-escalation to target atypical bacterial infection.    Urinalysis With Microscopic If Indicated (No Culture) - Urine, Clean Catch [[id number]]  (Abnormal) Collected: 11/16/24 1715    Specimen: Urine, Clean Catch Updated: 11/16/24 1752     Color, UA Yellow     Appearance, UA Clear     pH, UA 5.5     Specific Gravity, UA 1.017     Glucose, UA Negative     Ketones, UA Negative     Bilirubin, UA Negative     Blood, UA Small (1+)     Protein, UA Trace     Leuk Esterase, UA Large (3+)     Nitrite, UA Positive     Urobilinogen, UA 1.0 E.U./dL    Urinalysis, Microscopic Only - Urine, Clean Catch [[id number]]  (Abnormal) Collected: 11/16/24 1715    Specimen: Urine, Clean Catch Updated: 11/16/24 1752     RBC, UA 0-2 /HPF      WBC, UA Too Numerous to Count /HPF      Bacteria, UA 4+ /HPF      Squamous Epithelial Cells, UA None Seen /HPF      Hyaline Casts, UA None Seen /LPF      Methodology Automated Microscopy    High Sensitivity Troponin T 2Hr [[id number]]  (Abnormal) Collected: 11/16/24 1409    Specimen: Blood Updated: 11/16/24 1429     HS Troponin T 54 ng/L      Troponin T Delta -2 ng/L     Narrative:      High Sensitive Troponin T Reference Range:  <14.0 ng/L- Negative Female for AMI  <22.0 ng/L- Negative Male for AMI  >=14 - Abnormal Female indicating possible myocardial injury.  >=22 - Abnormal Male indicating possible myocardial injury.   Clinicians would have to utilize clinical acumen, EKG, Troponin, and serial changes to determine if it is an Acute Myocardial Infarction or myocardial injury due to an underlying chronic condition.         Single High Sensitivity Troponin T [[id number]]  (Abnormal) Collected: 11/16/24 1238    Specimen: Blood Updated: 11/16/24 1314     HS Troponin T 56 ng/L     Narrative:      High Sensitive Troponin T Reference Range:  <14.0 ng/L- Negative Female for AMI  <22.0 ng/L- Negative Male for AMI  >=14 - Abnormal Female indicating possible myocardial  injury.  >=22 - Abnormal Male indicating possible myocardial injury.   Clinicians would have to utilize clinical acumen, EKG, Troponin, and serial changes to determine if it is an Acute Myocardial Infarction or myocardial injury due to an underlying chronic condition.         BNP [[id number]]  (Abnormal) Collected: 11/16/24 1238    Specimen: Blood Updated: 11/16/24 1312     proBNP 2,608.0 pg/mL     Narrative:      This assay is used as an aid in the diagnosis of individuals suspected of having heart failure. It can be used as an aid in the diagnosis of acute decompensated heart failure (ADHF) in patients presenting with signs and symptoms of ADHF to the emergency department (ED). In addition, NT-proBNP of <300 pg/mL indicates ADHF is not likely.    Age Range Result Interpretation  NT-proBNP Concentration (pg/mL:      <50             Positive            >450                   Gray                 300-450                    Negative             <300    50-75           Positive            >900                  Gray                300-900                  Negative            <300      >75             Positive            >1800                  Gray                300-1800                  Negative            <300    Comprehensive Metabolic Panel [[id number]]  (Abnormal) Collected: 11/16/24 1238    Specimen: Blood Updated: 11/16/24 1306     Glucose 108 mg/dL      BUN 29 mg/dL      Creatinine 0.94 mg/dL      Sodium 136 mmol/L      Potassium 4.4 mmol/L      Chloride 101 mmol/L      CO2 28.4 mmol/L      Calcium 8.9 mg/dL      Total Protein 7.0 g/dL      Albumin 3.3 g/dL      ALT (SGPT) 21 U/L      AST (SGOT) 18 U/L      Alkaline Phosphatase 116 U/L      Total Bilirubin 0.2 mg/dL      Globulin 3.7 gm/dL      A/G Ratio 0.9 g/dL      BUN/Creatinine Ratio 30.9     Anion Gap 6.6 mmol/L      eGFR 75.1 mL/min/1.73     Narrative:      GFR Normal >60  Chronic Kidney Disease <60  Kidney Failure <15    The GFR formula is only valid for  adults with stable renal function between ages 18 and 70.    CBC & Differential [[id number]]  (Abnormal) Collected: 11/16/24 1238    Specimen: Blood Updated: 11/16/24 1253    Narrative:      The following orders were created for panel order CBC & Differential.  Procedure                               Abnormality         Status                     ---------                               -----------         ------                     CBC Auto Differential[[id number]]        Abnormal            Final result                 Please view results for these tests on the individual orders.    CBC Auto Differential [[id number]]  (Abnormal) Collected: 11/16/24 1238    Specimen: Blood Updated: 11/16/24 1253     WBC 10.80 10*3/mm3      RBC 3.76 10*6/mm3      Hemoglobin 11.8 g/dL      Hematocrit 37.8 %      .5 fL      MCH 31.4 pg      MCHC 31.2 g/dL      RDW 14.6 %      RDW-SD 54.9 fl      MPV 9.5 fL      Platelets 177 10*3/mm3      Neutrophil % 65.8 %      Lymphocyte % 17.8 %      Monocyte % 15.1 %      Eosinophil % 0.7 %      Basophil % 0.3 %      Immature Grans % 0.3 %      Neutrophils, Absolute 7.11 10*3/mm3      Lymphocytes, Absolute 1.92 10*3/mm3      Monocytes, Absolute 1.63 10*3/mm3      Eosinophils, Absolute 0.08 10*3/mm3      Basophils, Absolute 0.03 10*3/mm3      Immature Grans, Absolute 0.03 10*3/mm3     COVID-19 and FLU A/B PCR, 1 HR TAT - Swab, Nasopharynx [[id number]]  (Normal) Collected: 11/16/24 1204    Specimen: Swab from Nasopharynx Updated: 11/16/24 1241     COVID19 Not Detected     Influenza A PCR Not Detected     Influenza B PCR Not Detected    Narrative:      Fact sheet for providers: https://www.fda.gov/media/075583/download    Fact sheet for patients: https://www.fda.gov/media/565689/download    Test performed by PCR.             I reviewed the patient's new clinical results.    Assessment & Plan    Dyspnea dyspnea  Pulmonary edema  Fluid overload  Parainfluenza virus  MRSA positive  Elevated  troponin  Elevated D-dimer  -Cardiology pulmonary following  -Echo ordered/CT PE protocol    UTI  -rocephin  -follow culture    HTN  Amlodipine/clonidine/metoprolol    Essential tremors  -mysoline    DVT prophylaxis:lovenox  GI prophylaxis:protonix  Code status:DNR      I discussed the patient's findings and my recommendations with patient.     Candelaria Spears, APRN  11/17/24  11:22 EST        Electronically signed by Nasreen De La Cruz MD at 11/17/24 1314       Operative/Procedure Notes (all)    No notes of this type exist for this encounter.          Physician Progress Notes (last 48 hours)        Chacha Mccarty MD at 11/20/24 1302               LOS: 4 days   Patient Care Team:  Provider, No Known as PCP - Rogelio Khan MD as PCP - Family Medicine    Subjective     Interval History: Improving slightly    Patient Complaints: No specific complaints, up in chair, able to answer questions appropriately    History taken from: patient    Review of Systems   Constitutional:  Positive for activity change, appetite change and fatigue. Negative for diaphoresis and fever.   HENT:  Negative for facial swelling.    Eyes:  Negative for visual disturbance.   Respiratory:  Positive for cough. Negative for shortness of breath, wheezing and stridor.    Cardiovascular:  Negative for chest pain, palpitations and leg swelling.   Gastrointestinal:  Negative for abdominal pain, constipation, diarrhea, nausea and vomiting.   Endocrine: Negative for polyuria.   Genitourinary:  Negative for dysuria.   Musculoskeletal:  Positive for arthralgias and gait problem.   Neurological:  Negative for headaches.   Psychiatric/Behavioral:  Negative for confusion.            Objective     Vital Signs  Temp:  [97.5 °F (36.4 °C)-100.4 °F (38 °C)] 97.5 °F (36.4 °C)  Heart Rate:  [] 84  Resp:  [20-32] 22  BP: (111-127)/(58-68) 126/58    Physical Exam:     General Appearance:    Alert, cooperative, in no acute distress, frail   Head:     Normocephalic, without obvious abnormality, atraumatic   Eyes:            Lids and lashes normal, conjunctivae and sclerae normal, no   icterus, no pallor, corneas clear, PERRLA   Ears:    Ears appear intact with no abnormalities noted   Throat:   No oral lesions, no thrush, oral mucosa moist   Neck:   No adenopathy, supple, trachea midline, no thyromegaly, no   carotid bruit, no JVD   Lungs:     Scattered rhonchi    Heart:    Regular rhythm and normal rate, normal S1 and S2, no            murmur, no gallop, no rub, no click   Chest Wall:    No abnormalities observed   Abdomen:     Normal bowel sounds, no masses, no organomegaly, soft        Non-tender non-distended, no guarding,   Extremities:   Moves all extremities well, no edema, no cyanosis, no             Redness   Pulses:   Pulses palpable and equal bilaterally   Skin:   Scattered scabbed lesions on face, scalp, ears, chest wall, arms   Lymph nodes:   No palpable adenopathy   Neurologic:   No localizing deficits, gait not assessed.        Results Review:    Lab Results (last 24 hours)       Procedure Component Value Units Date/Time    Basic Metabolic Panel [[id number]]  (Abnormal) Collected: 11/20/24 0305    Specimen: Blood Updated: 11/20/24 0402     Glucose 91 mg/dL      BUN 28 mg/dL      Creatinine 0.85 mg/dL      Sodium 137 mmol/L      Potassium 4.6 mmol/L      Comment: Specimen hemolyzed.  Result may be falsely elevated.        Chloride 101 mmol/L      CO2 28.7 mmol/L      Calcium 8.9 mg/dL      BUN/Creatinine Ratio 32.9     Anion Gap 7.3 mmol/L      eGFR 80.5 mL/min/1.73     Narrative:      GFR Normal >60  Chronic Kidney Disease <60  Kidney Failure <15    The GFR formula is only valid for adults with stable renal function between ages 18 and 70.    CBC & Differential [[id number]]  (Abnormal) Collected: 11/20/24 0305    Specimen: Blood Updated: 11/20/24 0324    Narrative:      The following orders were created for panel order CBC & Differential.  Procedure                                Abnormality         Status                     ---------                               -----------         ------                     CBC Auto Differential[[id number]]        Abnormal            Final result                 Please view results for these tests on the individual orders.    CBC Auto Differential [[id number]]  (Abnormal) Collected: 11/20/24 0305    Specimen: Blood Updated: 11/20/24 0324     WBC 10.06 10*3/mm3      RBC 3.54 10*6/mm3      Hemoglobin 11.3 g/dL      Hematocrit 35.5 %      .3 fL      MCH 31.9 pg      MCHC 31.8 g/dL      RDW 14.2 %      RDW-SD 53.3 fl      MPV 10.5 fL      Platelets 160 10*3/mm3      Neutrophil % 65.9 %      Lymphocyte % 18.7 %      Monocyte % 14.5 %      Eosinophil % 0.3 %      Basophil % 0.1 %      Immature Grans % 0.5 %      Neutrophils, Absolute 6.63 10*3/mm3      Lymphocytes, Absolute 1.88 10*3/mm3      Monocytes, Absolute 1.46 10*3/mm3      Eosinophils, Absolute 0.03 10*3/mm3      Basophils, Absolute 0.01 10*3/mm3      Immature Grans, Absolute 0.05 10*3/mm3      nRBC 0.0 /100 WBC              Imaging Results (Last 24 Hours)       Procedure Component Value Units Date/Time    FL Video Swallow With Speech Single Contrast [[id number]] Resulted: 11/20/24 1040     Updated: 11/20/24 1040    Narrative:      This procedure was auto-finalized with no dictation required.                 I reviewed the patient's new clinical results.    Medication Review:   Scheduled Meds:amLODIPine, 5 mg, Oral, Daily  ampicillin-sulbactam, 3 g, Intravenous, Q6H  brimonidine, 1 drop, Right Eye, Q12H   And  timolol, 1 drop, Right Eye, Q12H  cloNIDine, 0.1 mg, Oral, BID  enoxaparin, 40 mg, Subcutaneous, Q24H  guaiFENesin, 1,200 mg, Oral, Q12H  ipratropium-albuterol, 3 mL, Nebulization, TID - RT  latanoprost, 1 drop, Right Eye, Nightly  methylPREDNISolone sodium succinate, 20 mg, Intravenous, Daily  metoprolol succinate XL, 12.5 mg, Oral, Q24H  pantoprazole, 40 mg,  Oral, Q AM  primidone, 250 mg, Oral, TID  sodium chloride, 10 mL, Intravenous, Q12H      Continuous Infusions:Pharmacy to Dose enoxaparin (LOVENOX),       PRN Meds:.  acetaminophen **OR** acetaminophen **OR** acetaminophen    senna-docusate sodium **AND** polyethylene glycol **AND** bisacodyl **AND** bisacodyl    Calcium Replacement - Follow Nurse / BPA Driven Protocol    hydrALAZINE    Magnesium Standard Dose Replacement - Follow Nurse / BPA Driven Protocol    ondansetron ODT **OR** ondansetron    Pharmacy to Dose enoxaparin (LOVENOX)    Phosphorus Replacement - Follow Nurse / BPA Driven Protocol    Potassium Replacement - Follow Nurse / BPA Driven Protocol    [COMPLETED] Insert peripheral IV **AND** sodium chloride    sodium chloride    sodium chloride     Assessment & Plan       CHF (congestive heart failure)  - appears euvolemic and well compensated. Will restart HCTZ.    Parainfluenza - resolving    Aspiration pneumonia - WBC trending down after change to Unasyn - continue    Dysphagia/aspiration - swallow study findings noted; now on altered diet    Tremor - currently on primidone; PT noted shuffling gait; unclear if patient has been diagnosed with Parkinson's - will try to find records in office system.      Glaucoma - home meds  HTN - clonidine, metoprolol, amlodipine    Lovenox for dvt prophy    Plan for disposition:[facility]    Chacha Mccarty MD  24  15:02 EST            Electronically signed by Chacha Mccarty MD at 24 1526       Filomena Melgar APRN at 24 1452       Attestation signed by Faustino García MD at 24 1711    I have reviewed this documentation and agree.                  [facility] CARDIOLOGY  [facility]        LOS:  LOS: 3 days   Patient Name: Sathya Flores  Age/Sex: 89 y.o. male  : 1930  MRN: [id number]    Day of Service: 24   Length of Stay: 3  Encounter Provider: BEBO Hess  Place of Service: [facility]  [facility] CARDIOLOGY  Patient Care Team:  Provider, No Known as PCP - Rogelio Khan MD as PCP - Family Medicine    Subjective:     Chief Complaint:  F/U HF    Subjective:   Patient sitting up in chair.  He appears more energetic today.  Reports breathing better but observed with cough.    Current Medications:   Scheduled Meds:amLODIPine, 5 mg, Oral, Daily  ampicillin-sulbactam, 3 g, Intravenous, Q6H  brimonidine, 1 drop, Right Eye, Q12H   And  timolol, 1 drop, Right Eye, Q12H  cloNIDine, 0.1 mg, Oral, BID  enoxaparin, 40 mg, Subcutaneous, Q24H  guaiFENesin, 1,200 mg, Oral, Q12H  ipratropium-albuterol, 3 mL, Nebulization, TID - RT  latanoprost, 1 drop, Right Eye, Nightly  methylPREDNISolone sodium succinate, 20 mg, Intravenous, Daily  metoprolol succinate XL, 12.5 mg, Oral, Q24H  pantoprazole, 40 mg, Oral, Q AM  primidone, 250 mg, Oral, TID  sodium chloride, 10 mL, Intravenous, Q12H      Continuous Infusions:Pharmacy to Dose enoxaparin (LOVENOX),         Allergies:  No Known Allergies    ROS    Objective:     Temp:  [98 °F (36.7 °C)-100 °F (37.8 °C)] 98.1 °F (36.7 °C)  Heart Rate:  [] 80  Resp:  [16-34] 25  BP: ()/(54-71) 123/54     Intake/Output Summary (Last 24 hours) at 11/19/2024 1452  Last data filed at 11/19/2024 0800  Gross per 24 hour   Intake 470 ml   Output 600 ml   Net -130 ml     Body mass index is 27.83 kg/m².      11/16/24  1144 11/16/24  1630 11/18/24  0405   Weight: 79.8 kg (176 lb) 79.7 kg (175 lb 11.3 oz) 78.2 kg (172 lb 6.4 oz)         Physical Exam:  Neuro:  CV:  Resp:  GI:  Ext:  Tele: AAOx3, no gross deficits  S1S2 RRR, no murmur  Nonlabored, coarse bilateral bases  BS+, abd soft  Pedal pulses palp, trace pitting BLE edema  SR with PACs and run AT                                                   Lab Review:   Results from last 7 days   Lab Units 11/19/24  0446 11/18/24  0456 11/16/24  1815 11/16/24  1238   SODIUM mmol/L 137 138   < > 136   POTASSIUM  "mmol/L 4.4 4.2   < > 4.4   CHLORIDE mmol/L 101 100   < > 101   CO2 mmol/L 26.4 29.3*   < > 28.4   BUN mg/dL 32* 29*   < > 29*   CREATININE mg/dL 0.88 1.08   < > 0.94   GLUCOSE mg/dL 104* 105*   < > 108*   CALCIUM mg/dL 8.6 8.9   < > 8.9   AST (SGOT) U/L  --   --   --  18   ALT (SGPT) U/L  --   --   --  21    < > = values in this interval not displayed.     Results from last 7 days   Lab Units 11/16/24  1409 11/16/24  1238   HSTROP T ng/L 54* 56*     Results from last 7 days   Lab Units 11/19/24  0446 11/18/24  0456   WBC 10*3/mm3 13.12* 15.43*   HEMOGLOBIN g/dL 11.4* 12.1*   HEMATOCRIT % 34.8* 38.7   PLATELETS 10*3/mm3 149 163         Results from last 7 days   Lab Units 11/19/24  0446 11/16/24  1815   MAGNESIUM mg/dL 2.0 1.9           Invalid input(s): \"LDLCALC\"  Results from last 7 days   Lab Units 11/16/24  1238   PROBNP pg/mL 2,608.0*     Results from last 7 days   Lab Units 11/16/24  1815   TSH uIU/mL 0.992       Recent Radiology:  Imaging Results (Most Recent)       Procedure Component Value Units Date/Time    XR Chest 1 View [[id number]] Collected: 11/19/24 0952     Updated: 11/19/24 0956    Narrative:      XR CHEST 1 VW    Date of Exam: 11/19/2024 9:46 AM EST    Indication: shortness of breath    Comparison: 11/16/2024 chest radiograph, 11/17/2024 chest CT    Findings:  Mild scattered patchy areas of airspace disease concerning for pneumonia. Underlying emphysema with chronic interstitial lung disease similar to prior studies. No pneumothorax. Severe bilateral glenohumeral osteoarthritis. Heart size top normal,   exaggerated by technique. Aortic arch atherosclerosis. No pneumothorax or pleural effusion.      Impression:      Impression:  1. Mild scattered patchy areas of airspace disease concerning for pneumonia.  2. Underlying emphysema and chronic interstitial lung disease.        Electronically Signed: Landen Mock MD    11/19/2024 9:54 AM EST    Workstation ID: [id number]    CT Chest Without Contrast " Diagnostic [[id number]] Collected: 11/17/24 2013     Updated: 11/17/24 2020    Narrative:      CT CHEST WO CONTRAST DIAGNOSTIC    Date of Exam: 11/17/2024 8:00 PM EST    Indication: pulm edema vs infiltrate.    Comparison: Chest CT dated 8/18/2021    Technique: Axial CT images were obtained of the chest without contrast administration.  Sagittal and coronal reconstructions were performed.  Automated exposure control and iterative reconstruction methods were used.      FINDINGS:    Thoracic inlet: Unremarkable.    Great vessels: Atherosclerotic plaque is seen within the thoracic aorta and proximal arch vessels.    Mediastinum/Anna: Scattered subcentimeter mediastinal lymph nodes are noted, nonspecific. The esophagus appears unremarkable.    Lung parenchyma: Motion artifact limits evaluation of the lungs. Paraseptal emphysematous changes noted within the bilateral upper lobes. Mild hypoventilatory changes within the bilateral lung bases. Mild bilateral lower lobe infiltrates cannot be   excluded.    Trachea and airways: The trachea and central airways appear unremarkable.    Pleural space: No significant pleural effusion or pneumothorax.    Heart and pericardium: Coronary artery calcifications are present. Otherwise, the heart and pericardium appear unremarkable.    Chest wall: No acute or suspicious osseous or soft tissue lesion is identified.    Upper abdomen: No acute abnormality is identified within the visualized upper abdomen.      Impression:      1.Examination is limited due to motion artifact.  2.Mild bilateral lower lobe infiltrates cannot be excluded.  3.Paraseptal emphysematous changes within the bilateral upper lobes.  4.Additional findings as detailed above.        Electronically Signed: Dom Noel MD    11/17/2024 8:18 PM EST    Workstation ID: [id number]    XR Chest 1 View [[id number]] Collected: 11/16/24 1220     Updated: 11/16/24 1223    Narrative:      XR CHEST 1 VW    Date of Exam: 11/16/2024  12:07 PM EST    Indication: cough    Comparison: Chest x-ray dated 6/17/2020    Findings:  Cardiac silhouette is enlarged. Vague bilateral lung opacities may represent edema or mild infiltrates. No significant pleural effusion or pneumothorax. No acute osseous lesion is seen. There are senescent changes of the aorta and skeletal structures.      Impression:      Impression:  1.Findings compatible with mild CHF. Concomitant mild infiltrates cannot be excluded.      Electronically Signed: Dom Noel MD    11/16/2024 12:21 PM EST    Workstation ID: [id number]            ECHOCARDIOGRAM:    Results for orders placed during the hospital encounter of 11/16/24    Adult Transthoracic Echo Complete W/ Cont if Necessary Per Protocol    Interpretation Summary    Left ventricular systolic function is normal. Calculated left ventricular EF = 64.3%    Left ventricular wall thickness is consistent with mild to moderate concentric hypertrophy.    Left ventricular diastolic function is consistent with (grade I) impaired relaxation.    The left atrial cavity is mildly dilated.    There is mild calcification of the aortic valve.    Estimated right ventricular systolic pressure from tricuspid regurgitation is markedly elevated (>55 mmHg).    Transthoracic echocardiography with EF of 64%.  Mild MR and no effusion    Electronically signed by Faustino García MD, 11/17/24, 2:35 PM EST.        I reviewed the patient's new clinical results.    EKG:      Assessment:       CHF (congestive heart failure)    1) acute on chronic diastolic heart failure  - CT chest w/o showed vascular congestion  -TSH WNL  - 2D echo 11/2024 shows an EF of 64% with grade 1 diastolic dysfunction and mild MR.  - s/p IV lasix     2) Acute respiratory failure / parainfluenza virus / PNA  - pulmonary following  - repeat CXR suggests PNA     3) HTN     4) UTI    Plan:   Patient remains relatively compensated.  Daily weights trending downward.  Weight pending for  today.  BP improving.  Tele shows SR with PACs brief run AT.         Electronically signed by BEBO Hess, 11/19/24, 3:02 PM EST.     Electronically signed by Faustino García MD at 11/19/24 1415       Chacha Mccarty MD at 11/19/24 2105               LOS: 3 days   Patient Care Team:  Provider, No Known as PCP - Rogelio Khan MD as PCP - Family Medicine    Subjective     Interval History:No significant change; persistent low grade fever    Patient Complaints: Congested cough, generalized weakness    History taken from: patient    Review of Systems   Constitutional:  Positive for activity change, appetite change, fatigue and fever. Negative for chills and diaphoresis.   HENT:  Negative for facial swelling.    Eyes:  Negative for visual disturbance.   Respiratory:  Positive for cough, shortness of breath and wheezing.    Cardiovascular:  Negative for chest pain, palpitations and leg swelling.   Gastrointestinal:  Negative for abdominal pain, constipation, diarrhea, nausea and vomiting.   Endocrine: Negative for polyuria.   Genitourinary:  Negative for dysuria.   Musculoskeletal:  Positive for gait problem. Negative for arthralgias.   Neurological:  Negative for dizziness and light-headedness.   Psychiatric/Behavioral:  Negative for confusion.            Objective     Vital Signs  Temp:  [98 °F (36.7 °C)-100 °F (37.8 °C)] 98 °F (36.7 °C)  Heart Rate:  [] 96  Resp:  [20-34] 26  BP: ()/(52-71) 123/54    Physical Exam:     General Appearance:    Alert, answers questions appropriately, frail   Head:    Normocephalic, without obvious abnormality, atraumatic   Eyes:            Lids and lashes normal, conjunctivae and sclerae normal, no   icterus, no pallor, corneas clear, PERRLA   Ears:    Ears appear intact with no abnormalities noted   Throat:   No oral lesions, no thrush, oral mucosa moist   Neck:   No adenopathy, supple, trachea midline, no thyromegaly, no   carotid bruit, no JVD    Lungs:     Diffuse rhonchi, scattered wheezing    Heart:    Regular rhythm and normal rate, normal S1 and S2, no            murmur, no gallop, no rub, no click   Chest Wall:    No abnormalities observed   Abdomen:     Normal bowel sounds, no masses, no organomegaly, soft        Non-tender non-distended, no guarding,   Extremities:   Moves all extremities well, no edema, no cyanosis, no             Redness   Pulses:   Pulses palpable and equal bilaterally   Skin:   Scabs on chest wall, nose   Lymph nodes:   No palpable adenopathy   Neurologic:   Cranial nerves 2 - 12 grossly intact, sensation intact, DTR       present and equal bilaterally        Results Review:    Lab Results (last 24 hours)       Procedure Component Value Units Date/Time    Magnesium [[id number]]  (Normal) Collected: 11/19/24 0446    Specimen: Blood Updated: 11/19/24 0537     Magnesium 2.0 mg/dL     Basic Metabolic Panel [[id number]]  (Abnormal) Collected: 11/19/24 0446    Specimen: Blood Updated: 11/19/24 0537     Glucose 104 mg/dL      BUN 32 mg/dL      Creatinine 0.88 mg/dL      Sodium 137 mmol/L      Potassium 4.4 mmol/L      Comment: Specimen hemolyzed.  Result may be falsely elevated.        Chloride 101 mmol/L      CO2 26.4 mmol/L      Calcium 8.6 mg/dL      BUN/Creatinine Ratio 36.4     Anion Gap 9.6 mmol/L      eGFR 79.7 mL/min/1.73     Narrative:      GFR Normal >60  Chronic Kidney Disease <60  Kidney Failure <15    The GFR formula is only valid for adults with stable renal function between ages 18 and 70.    CBC & Differential [[id number]]  (Abnormal) Collected: 11/19/24 0446    Specimen: Blood Updated: 11/19/24 0503    Narrative:      The following orders were created for panel order CBC & Differential.  Procedure                               Abnormality         Status                     ---------                               -----------         ------                     CBC Auto Differential[[id number]]        Abnormal             Final result                 Please view results for these tests on the individual orders.    CBC Auto Differential [[id number]]  (Abnormal) Collected: 11/19/24 0446    Specimen: Blood Updated: 11/19/24 0503     WBC 13.12 10*3/mm3      RBC 3.57 10*6/mm3      Hemoglobin 11.4 g/dL      Hematocrit 34.8 %      MCV 97.5 fL      MCH 31.9 pg      MCHC 32.8 g/dL      RDW 14.4 %      RDW-SD 51.7 fl      MPV 10.7 fL      Platelets 149 10*3/mm3      Neutrophil % 67.5 %      Lymphocyte % 14.5 %      Monocyte % 17.1 %      Eosinophil % 0.2 %      Basophil % 0.2 %      Immature Grans % 0.5 %      Neutrophils, Absolute 8.88 10*3/mm3      Lymphocytes, Absolute 1.90 10*3/mm3      Monocytes, Absolute 2.24 10*3/mm3      Eosinophils, Absolute 0.02 10*3/mm3      Basophils, Absolute 0.02 10*3/mm3      Immature Grans, Absolute 0.06 10*3/mm3      nRBC 0.0 /100 WBC     Urine Culture - Urine, Urine, Clean Catch [[id number]]  (Abnormal) Collected: 11/17/24 1213    Specimen: Urine, Clean Catch Updated: 11/18/24 1324     Urine Culture >100,000 CFU/mL Gram Negative Bacilli    Narrative:      Colonization of the urinary tract without infection is common. Treatment is discouraged unless the patient is symptomatic, pregnant, or undergoing an invasive urologic procedure.             Imaging Results (Last 24 Hours)       ** No results found for the last 24 hours. **                 I reviewed the patient's new clinical results.    Medication Review:   Scheduled Meds:amLODIPine, 5 mg, Oral, Daily  ampicillin-sulbactam, 3 g, Intravenous, Q6H  brimonidine, 1 drop, Right Eye, Q12H   And  timolol, 1 drop, Right Eye, Q12H  cloNIDine, 0.1 mg, Oral, BID  enoxaparin, 40 mg, Subcutaneous, Q24H  ipratropium-albuterol, 3 mL, Nebulization, TID - RT  latanoprost, 1 drop, Right Eye, Nightly  methylPREDNISolone sodium succinate, 20 mg, Intravenous, Daily  metoprolol succinate XL, 12.5 mg, Oral, Q24H  pantoprazole, 40 mg, Oral, Q AM  primidone, 250 mg, Oral,  TID  sodium chloride, 10 mL, Intravenous, Q12H      Continuous Infusions:Pharmacy to Dose enoxaparin (LOVENOX),       PRN Meds:.  acetaminophen **OR** acetaminophen **OR** acetaminophen    senna-docusate sodium **AND** polyethylene glycol **AND** bisacodyl **AND** bisacodyl    Calcium Replacement - Follow Nurse / BPA Driven Protocol    hydrALAZINE    Magnesium Standard Dose Replacement - Follow Nurse / BPA Driven Protocol    ondansetron ODT **OR** ondansetron    Pharmacy to Dose enoxaparin (LOVENOX)    Phosphorus Replacement - Follow Nurse / BPA Driven Protocol    Potassium Replacement - Follow Nurse / BPA Driven Protocol    [COMPLETED] Insert peripheral IV **AND** sodium chloride    sodium chloride    sodium chloride     Assessment & Plan       CHF (congestive heart failure)  - appears euvolemic and has elevated BUN/Cr ratio, no further diuresis  Parainfluenza virus -  steroids, Duonebs, Mucinex, supplemental oxygen  UTI (?) - awaiting urine culture;  Pneumonia - likelly developing secondary bacterial pneumonia, concern for aspiration.  Repeat chest xray, changing antibiotics to Unasyn.  ST eval  Tremor - primidone  HTN- metoprolol, amlodipine, clonidine  Glaucoma - home eye drops    Lovenox for dvt prophy    Plan for disposition:TBD    Chacha Mccarty MD  11/19/24  09:31 EST            Electronically signed by Chacha Mccarty MD at 11/19/24 0935       Draw, MD Gian at 11/19/24 0806          Daily Progress Note        CHF (congestive heart failure)    Assessment:    Acute hypoxic respiratory insufficiency    Elevated troponin and proBNP  Iron deficiency    Abnormal UA, urine culture pending    Viral panel positive for parainfluenza  MRSA DNA probe positive     Recommendations:     Oxygen titration currently on 3 L nasal cannula  Low-dose steroids IV Solu-Medrol 20 mg daily  Empiric antibiotic Rocephin  Check urine cultures  DVT prophylaxis Lovenox 40 mg subcu daily  Protonix 40 mg daily                     LOS: 3 days      Subjective         Objective     Vital signs for last 24 hours:  Vitals:    11/18/24 2000 11/18/24 2100 11/18/24 2341 11/19/24 0307   BP: 111/55 99/59 137/59 123/71   BP Location:   Right arm Right arm   Patient Position:   Lying Lying   Pulse: 108 97 94 96   Resp:   (!) 31 (!) 34   Temp:   99.6 °F (37.6 °C) 100 °F (37.8 °C)   TempSrc:   Axillary Axillary   SpO2: 98% 96% 100% 97%   Weight:       Height:           Intake/Output last 3 shifts:  I/O last 3 completed shifts:  In: 980 [P.O.:980]  Out: 1000 [Urine:1000]  Intake/Output this shift:  No intake/output data recorded.      Radiology  Imaging Results (Last 24 Hours)       ** No results found for the last 24 hours. **            Labs:  Results from last 7 days   Lab Units 11/19/24  0446   WBC 10*3/mm3 13.12*   HEMOGLOBIN g/dL 11.4*   HEMATOCRIT % 34.8*   PLATELETS 10*3/mm3 149     Results from last 7 days   Lab Units 11/19/24  0446 11/16/24  1815 11/16/24  1238   SODIUM mmol/L 137   < > 136   POTASSIUM mmol/L 4.4   < > 4.4   CHLORIDE mmol/L 101   < > 101   CO2 mmol/L 26.4   < > 28.4   BUN mg/dL 32*   < > 29*   CREATININE mg/dL 0.88   < > 0.94   CALCIUM mg/dL 8.6   < > 8.9   BILIRUBIN mg/dL  --   --  0.2   ALK PHOS U/L  --   --  116   ALT (SGPT) U/L  --   --  21   AST (SGOT) U/L  --   --  18   GLUCOSE mg/dL 104*   < > 108*    < > = values in this interval not displayed.         Results from last 7 days   Lab Units 11/16/24  1238   ALBUMIN g/dL 3.3*     Results from last 7 days   Lab Units 11/16/24  1409 11/16/24  1238   HSTROP T ng/L 54* 56*         Results from last 7 days   Lab Units 11/19/24  0446   MAGNESIUM mg/dL 2.0         Results from last 7 days   Lab Units 11/16/24  1815   TSH uIU/mL 0.992           Meds:   SCHEDULE  amLODIPine, 5 mg, Oral, Daily  brimonidine, 1 drop, Right Eye, Q12H   And  timolol, 1 drop, Right Eye, Q12H  cefTRIAXone, 1,000 mg, Intravenous, Q24H  cloNIDine, 0.1 mg, Oral, BID  enoxaparin, 40 mg, Subcutaneous, Q24H  latanoprost, 1  drop, Right Eye, Nightly  methylPREDNISolone sodium succinate, 20 mg, Intravenous, Daily  metoprolol succinate XL, 12.5 mg, Oral, Q24H  pantoprazole, 40 mg, Oral, Q AM  primidone, 250 mg, Oral, TID  sodium chloride, 10 mL, Intravenous, Q12H      Infusions  Pharmacy to Dose enoxaparin (LOVENOX),       PRNs    acetaminophen **OR** acetaminophen **OR** acetaminophen    senna-docusate sodium **AND** polyethylene glycol **AND** bisacodyl **AND** bisacodyl    Calcium Replacement - Follow Nurse / BPA Driven Protocol    hydrALAZINE    Magnesium Standard Dose Replacement - Follow Nurse / BPA Driven Protocol    ondansetron ODT **OR** ondansetron    Pharmacy to Dose enoxaparin (LOVENOX)    Phosphorus Replacement - Follow Nurse / BPA Driven Protocol    Potassium Replacement - Follow Nurse / BPA Driven Protocol    [COMPLETED] Insert peripheral IV **AND** sodium chloride    sodium chloride    sodium chloride    Physical Exam:  Physical Exam  Cardiovascular:      Heart sounds: Murmur heard.      No gallop.   Pulmonary:      Effort: No respiratory distress.      Breath sounds: No stridor. Rhonchi and rales present. No wheezing.   Chest:      Chest wall: No tenderness.         ROS  Review of Systems   Respiratory:  Positive for cough and shortness of breath. Negative for wheezing and stridor.    Cardiovascular:  Positive for palpitations. Negative for chest pain and leg swelling.             Total time spent with patient greater than: 45 Minutes    Electronically signed by Gian Pérez MD at 24 9964       Filomena Melgar APRN at 24 1551       Attestation signed by Faustino García MD at 24 2722    I have reviewed this documentation and agree.                  [facility] CARDIOLOGY  [facility]        LOS:  LOS: 2 days   Patient Name: Sathya Flores  Age/Sex: 89 y.o. male  : 1930  MRN: [id number]    Day of Service: 24   Length of Stay: 2  Encounter Provider:  BEBO Hess  Place of Service: [facility] CARDIOLOGY  Patient Care Team:  Provider, No Known as PCP - Rogelio Khan MD as PCP - Family Medicine    Subjective:     Chief Complaint:  F/U HF    Subjective:   Patient sitting up in chair.  Appears lethargic.  Reports breathing a little better.    Current Medications:   Scheduled Meds:amLODIPine, 5 mg, Oral, Daily  cefTRIAXone, 1,000 mg, Intravenous, Q24H  cloNIDine, 0.1 mg, Oral, BID  enoxaparin, 40 mg, Subcutaneous, Q24H  methylPREDNISolone sodium succinate, 20 mg, Intravenous, Daily  metoprolol succinate XL, 12.5 mg, Oral, Q24H  primidone, 250 mg, Oral, TID  sodium chloride, 10 mL, Intravenous, Q12H      Continuous Infusions:Pharmacy to Dose enoxaparin (LOVENOX),         Allergies:  No Known Allergies    ROS    Objective:     Temp:  [98.5 °F (36.9 °C)-100.6 °F (38.1 °C)] 98.9 °F (37.2 °C)  Heart Rate:  [] 82  Resp:  [20-37] 20  BP: ()/(43-76) 112/55     Intake/Output Summary (Last 24 hours) at 11/18/2024 1552  Last data filed at 11/18/2024 1200  Gross per 24 hour   Intake 750 ml   Output 400 ml   Net 350 ml     Body mass index is 27.83 kg/m².      11/16/24  1144 11/16/24  1630 11/18/24  0405   Weight: 79.8 kg (176 lb) 79.7 kg (175 lb 11.3 oz) 78.2 kg (172 lb 6.4 oz)         Physical Exam:  Neuro:  CV:  Resp:  GI:  Ext:  Tele: AAOx3, generalized weakness  S1S2 RRR, no murmur  Nonlabored, faint wheezing  BS+, abd soft  Pedal pulses palp, no edema  SR with PACs                                                   Lab Review:   Results from last 7 days   Lab Units 11/18/24  0456 11/17/24  0622 11/16/24  1815 11/16/24  1238   SODIUM mmol/L 138 139   < > 136   POTASSIUM mmol/L 4.2 4.3   < > 4.4   CHLORIDE mmol/L 100 99   < > 101   CO2 mmol/L 29.3* 29.0   < > 28.4   BUN mg/dL 29* 23   < > 29*   CREATININE mg/dL 1.08 0.87   < > 0.94   GLUCOSE mg/dL 105* 100*   < > 108*   CALCIUM mg/dL 8.9 9.4   < > 8.9   AST (SGOT)  "U/L  --   --   --  18   ALT (SGPT) U/L  --   --   --  21    < > = values in this interval not displayed.     Results from last 7 days   Lab Units 11/16/24  1409 11/16/24  1238   HSTROP T ng/L 54* 56*     Results from last 7 days   Lab Units 11/18/24  0456 11/17/24  0622   WBC 10*3/mm3 15.43* 12.50*   HEMOGLOBIN g/dL 12.1* 13.2   HEMATOCRIT % 38.7 40.3   PLATELETS 10*3/mm3 163 195         Results from last 7 days   Lab Units 11/16/24  1815   MAGNESIUM mg/dL 1.9           Invalid input(s): \"LDLCALC\"  Results from last 7 days   Lab Units 11/16/24  1238   PROBNP pg/mL 2,608.0*     Results from last 7 days   Lab Units 11/16/24  1815   TSH uIU/mL 0.992       Recent Radiology:  Imaging Results (Most Recent)       Procedure Component Value Units Date/Time    CT Chest Without Contrast Diagnostic [[id number]] Collected: 11/17/24 2013     Updated: 11/17/24 2020    Narrative:      CT CHEST WO CONTRAST DIAGNOSTIC    Date of Exam: 11/17/2024 8:00 PM EST    Indication: pulm edema vs infiltrate.    Comparison: Chest CT dated 8/18/2021    Technique: Axial CT images were obtained of the chest without contrast administration.  Sagittal and coronal reconstructions were performed.  Automated exposure control and iterative reconstruction methods were used.      FINDINGS:    Thoracic inlet: Unremarkable.    Great vessels: Atherosclerotic plaque is seen within the thoracic aorta and proximal arch vessels.    Mediastinum/Anna: Scattered subcentimeter mediastinal lymph nodes are noted, nonspecific. The esophagus appears unremarkable.    Lung parenchyma: Motion artifact limits evaluation of the lungs. Paraseptal emphysematous changes noted within the bilateral upper lobes. Mild hypoventilatory changes within the bilateral lung bases. Mild bilateral lower lobe infiltrates cannot be   excluded.    Trachea and airways: The trachea and central airways appear unremarkable.    Pleural space: No significant pleural effusion or pneumothorax.    Heart " and pericardium: Coronary artery calcifications are present. Otherwise, the heart and pericardium appear unremarkable.    Chest wall: No acute or suspicious osseous or soft tissue lesion is identified.    Upper abdomen: No acute abnormality is identified within the visualized upper abdomen.      Impression:      1.Examination is limited due to motion artifact.  2.Mild bilateral lower lobe infiltrates cannot be excluded.  3.Paraseptal emphysematous changes within the bilateral upper lobes.  4.Additional findings as detailed above.        Electronically Signed: Dom Noel MD    11/17/2024 8:18 PM EST    Workstation ID: [id number]    XR Chest 1 View [[id number]] Collected: 11/16/24 1220     Updated: 11/16/24 1223    Narrative:      XR CHEST 1 VW    Date of Exam: 11/16/2024 12:07 PM EST    Indication: cough    Comparison: Chest x-ray dated 6/17/2020    Findings:  Cardiac silhouette is enlarged. Vague bilateral lung opacities may represent edema or mild infiltrates. No significant pleural effusion or pneumothorax. No acute osseous lesion is seen. There are senescent changes of the aorta and skeletal structures.      Impression:      Impression:  1.Findings compatible with mild CHF. Concomitant mild infiltrates cannot be excluded.      Electronically Signed: Dom Noel MD    11/16/2024 12:21 PM EST    Workstation ID: [id number]            ECHOCARDIOGRAM:    Results for orders placed during the hospital encounter of 11/16/24    Adult Transthoracic Echo Complete W/ Cont if Necessary Per Protocol    Interpretation Summary    Left ventricular systolic function is normal. Calculated left ventricular EF = 64.3%    Left ventricular wall thickness is consistent with mild to moderate concentric hypertrophy.    Left ventricular diastolic function is consistent with (grade I) impaired relaxation.    The left atrial cavity is mildly dilated.    There is mild calcification of the aortic valve.    Estimated right ventricular  systolic pressure from tricuspid regurgitation is markedly elevated (>55 mmHg).    Transthoracic echocardiography with EF of 64%.  Mild MR and no effusion    Electronically signed by Faustino García MD, 24, 2:35 PM EST.        I reviewed the patient's new clinical results.    EKG:      Assessment:       CHF (congestive heart failure)    1) acute on chronic diastolic heart failure  - CT chest w/o showed vascular congestion  -TSH WNL  - 2D echo 2024 shows an EF of 64% with grade 1 diastolic dysfunction and mild MR.  - s/p IV lasix    2) Acute respiratory failure / parainfluenza virus  - pulmonary following    3) HTN    4) UTI      Plan:   Patient appears relatively compensated with respect to volume.  Tele shows SR with frequent PACs.  Check Mg.  Continue on BB.  BP soft.  Will put BP parameters on amlodipine.        Electronically signed by BEBO Hess, 24, 4:06 PM EST.     Electronically signed by Faustino García MD at 24 1709       Consult Notes (last 48 hours)  Notes from 24 1545 through 24 1545   No notes of this type exist for this encounter.       Nutrition Notes (most recent note)    No notes exist for this encounter.          Speech Language Pathology Notes (most recent note)        Fakto, [place], SLP at 24 0900          Acute Care - Speech Language Pathology   Swallow Initial Evaluation  [place]     Patient Name: Sathya Flores  : 1930  MRN: [id number]  Today's Date: 2024               Admit Date: 2024    Visit Dx:     ICD-10-CM ICD-9-CM   1. Acute congestive heart failure, unspecified heart failure type  I50.9 428.0   2. Elevated troponin  R79.89 790.6   3. Atrial fibrillation, unspecified type  I48.91 427.31     Patient Active Problem List   Diagnosis    CHF (congestive heart failure)     History reviewed. No pertinent past medical history.  History reviewed. No pertinent surgical history.    SLP Recommendation and Plan  SLP  Swallowing Diagnosis: moderate;oral dysphagia; mild/mod pharyngeal dysphagia (11/20/24 0900)  SLP Diet Recommendation: mech soft w/ nectar thick liquids (NTL), no straw  (11/20/24 0900)  Recommended Precautions and Strategies: upright posture during/after eating (11/20/24 0900)  SLP Rec. for Method of Medication Administration: whole in NTL, or whole or crushed in puree, or as tolrated  (11/20/24 0900)  Monitor for Signs of Aspiration: yes, notify SLP if any concerns (11/20/24 0900)  Recommended Diagnostics: reassess via clinical swallow evaluation and/or instrumental exam (MBS) (11/20/24 0900)  Swallow Criteria for Skilled Therapeutic Interventions Met: demonstrates skilled criteria (11/20/24 0900)  Rehab Potential/Prognosis, Swallowing: adequate, monitor progress closely (11/20/24 0900)  Therapy Frequency (Swallow): 3 days per week, 4 days per week (11/20/24 0900)  Predicted Duration Therapy Intervention (Days): until discharge (11/20/24 0900)  Oral Care Recommendations: Oral Care BID/PRN, Before ice/water (11/20/24 0900)     SWALLOW EVALUATION (Last 72 Hours)       SLP Adult Swallow Evaluation       Row Name 11/20/24 0900       Rehab Evaluation    Document Type evaluation  -PF    Subjective Information no complaints  -PF    Patient Observations alert;cooperative  -PF    Patient Effort good  -PF       General Information    Patient Profile Reviewed yes  -PF    Current Method of Nutrition regular textures;thin liquids  -PF    Precautions/Limitations, Vision WFL;for purposes of eval  -PF    Precautions/Limitations, Hearing WFL;for purposes of eval  -PF    Prior Level of Function-Communication WFL  -PF    Prior Level of Function-Swallowing regular textures;thin liquids  -PF    Plans/Goals Discussed with patient;family  -PF    Barriers to Rehab none identified  -PF       Oral Motor Structure and Function    Dentition Assessment natural, present and adequate  -PF    Secretion Management WNL/WFL  -PF    Mucosal Quality  moist, healthy  -PF       Oral Musculature and Cranial Nerve Assessment    Oral Motor General Assessment generalized oral motor weakness  -PF       General Eating/Swallowing Observations    Respiratory Support Currently in Use nasal cannula  -PF    O2 Liters 3L  -PF    Eating/Swallowing Skills fed by staff/caregiver  -PF    Positioning During Eating upright 90 degree;upright in chair  -PF    Utensils Used spoon;cup;straw  -PF    Consistencies Trialed regular textures;soft to chew textures;mechanical ground textures;pureed;thin liquids  -PF       MBS/VFSS Interpretation    VFSS Summary VFSS completed this date. The patient was standing upright at a 90-degree angle and viewed in the lateral projection. Pt self-fed all trials as provided by SLP respectively: NT by cup x2, NT by straw sequential sips x1, puree (applesauce) x2, mechanical soft/mixed consistency (peaches) x2, NT wash by cup, thin by spoon x2, and thin by cup x2 to complete the study.     Tremulous lingual movement. Complete labial seal resulting in no anterior spillage on any trial or consistency.  Premature spillage over the BOT and onto the valleculae on all consistencies prior to the initiation of pharyngeal swallow. Pt w/ natural dentition. Pt demonstrated anterior rotary and slow mastication, but adequate, on [facility] soft solids.   A-P reduced. Swallow initiation timely.  Adequate laryngeal elevation and reduced forward hyoid movement.  Trace to min lingual residue and mild/mod vallecular residue noted after the swallow on all trials and consistencies. Reduced  approximation of PPW and BOT. Epiglottic inversion inconsistently deflects with certain consistencies and dependent upon bolus size. Anterior/curved cervical spine C1-C6 appears to impact epiglottic's ability to invert consistently and adequately during swallow. Vestibular closure adequate on NT by cup, puree, peaches; No penetration of aspiration observed; Material does not enter airway (a 1 on  the [place] Penetration-Aspiration Scale). Epiglottic inversion incomplete on NT by straw sequential sip and thins. Pt demonstrated penetration on NT by straw sequential sip that does not clear after the swallow. Pt unable to cough or throat clear when prompted. Material enters the airway, remains above the vocal folds, and is not ejected from the airway (a 3 on the Rosenbeck Penetration-Aspiration Scale). Deep penetration on all trials of thin by cup w/ suspected eventual aspiration to and below the vocal folds as pt demonstrated a cough after thin trials acceptance. Esophageal scan was unremarkable.     Recommendations:       - Mech soft w/ nectar thick liquids (NTL), no straw; feeding assistance, [place] Water Protocol (see guidelines below)   - Meds: whole in NTL or puree, or as tolerated    - General reflux and aspiration precautions     - Safe swallow strategies: HOB at a 90 degree angle, slow rate of intake, small bites/sips, alternate liquid and solid     - ST will continue to follow to ensure tolerance and safety of rec'd diet, provide further recs as indicated.     Water Protocol:  The rationale to recommend water when a PO diet cannot appropriately/functionally sustain nutrition is because water is low risk for aspiration pna when compared to aspiration of food or other liquids.    Benefits of a water protocol include but are not limited to:     Oral gratification  Engagement of oropharyngeal swallow musculature  Decrease likelihood of dehydration       Guidelines for proper implementation include:  Thorough oral care prior to consuming water  Upright at 90 degree hip flexion  Small sips at slow rate  Monitor for any changes in respiratory status and discontinue if distress noted     The [place] Free Water Protocol is a research-based protocol established in 1984       Penetration of thin by cup that does not clear after the swallow w/ suspected eventual aspiration below the vocal folds       -PF        SLP Evaluation Clinical Impression    SLP Swallowing Diagnosis moderate;oral dysphagia; mild/mod pharyngeal dysphagia  -PF    Functional Impact risk of aspiration/pneumonia;risk of malnutrition;risk of dehydration  -PF    Rehab Potential/Prognosis, Swallowing adequate, monitor progress closely  -PF    Swallow Criteria for Skilled Therapeutic Interventions Met demonstrates skilled criteria  -PF       Recommendations    Therapy Frequency (Swallow) 3 days per week;4 days per week  -PF    Predicted Duration Therapy Intervention (Days) until discharge  -PF    SLP Diet Recommendation NPO  -PF    Recommended Diagnostics VFSS (MBS)  -PF    Recommended Precautions and Strategies upright posture during/after eating  -PF    Oral Care Recommendations Oral Care BID/PRN;Before ice/water  -PF    SLP Rec. for Method of Medication Administration meds via alternate route  -PF    Monitor for Signs of Aspiration yes;notify SLP if any concerns  -PF       Swallow Goals (SLP)    Swallow LTGs Swallow Long Term Goal (free text)  -PF    Swallow STGs diet tolerance goal selection (SLP)  -PF    Diet Tolerance Goal Selection (SLP) Swallow Short Term Goal 1;Swallow Short Term Goal 2  -PF       (LTG) Swallow    (LTG) Swallow Pt will maximize swallow function for least restrictive PO diet, exhibiting no complication associated with dysphagia, adequate PO intake, and demonstrating independent use of swallow compensation.  -PF    [place] (Swallow Long Term Goal) with minimal cues (75-90% accuracy)  -PF    Time Frame (Swallow Long Term Goal) by discharge  -PF    Progress/Outcomes (Swallow Long Term Goal) new goal  -PF       (STG) Swallow 1    (STG) Swallow 1 Patient will participate in ongoing assessment of swallow, including reevaluation clinically and/or including instrumental assessment of swallow if indicated, to further assess swallow function and return to oral nutrition.  -PF    [place] (Swallow Short Term Goal 1) with minimal cues  (75-90% accuracy)  -PF    Time Frame (Swallow Short Term Goal 1) 1 week  -PF    Progress/Outcomes (Swallow Short Term Goal 1) new goal  -PF       (STG) Swallow 2    (STG) Swallow 2 The patient will participate in a full meal assessment to determine safety and adequacy of recommended diet, independent use of safe swallow compensations, and additional goals/recommendations to follow.  -PF    [place] (Swallow Short Term Goal 2) with minimal cues (75-90% accuracy)  -PF    Time Frame (Swallow Short Term Goal 2) 1 week  -PF    Progress/Outcomes (Swallow Short Term Goal 2) new goal  -PF              User Key  (r) = Recorded By, (t) = Taken By, (c) = Cosigned By      Initials Name Effective Dates    PF Betty Pino, SLP 05/08/23 -                     EDUCATION  The patient has been educated in the following areas:   Dysphagia (Swallowing Impairment) Oral Care/Hydration Modified Diet Instruction.        SLP GOALS       Row Name 11/20/24 0900       (LTG) Swallow    (LTG) Swallow Pt will maximize swallow function for least restrictive PO diet, exhibiting no complication associated with dysphagia, adequate PO intake, and demonstrating independent use of swallow compensation.  -PF    [place] (Swallow Long Term Goal) with minimal cues (75-90% accuracy)  -PF    Time Frame (Swallow Long Term Goal) by discharge  -PF    Progress/Outcomes (Swallow Long Term Goal) new goal  -PF       (STG) Swallow 1    (STG) Swallow 1 Patient will participate in ongoing assessment of swallow, including reevaluation clinically and/or including instrumental assessment of swallow if indicated, to further assess swallow function and return to oral nutrition.  -PF    [place] (Swallow Short Term Goal 1) with minimal cues (75-90% accuracy)  -PF    Time Frame (Swallow Short Term Goal 1) 1 week  -PF    Progress/Outcomes (Swallow Short Term Goal 1) new goal  -PF       (STG) Swallow 2    (STG) Swallow 2 The patient will participate in a full  meal assessment to determine safety and adequacy of recommended diet, independent use of safe swallow compensations, and additional goals/recommendations to follow.  -PF    [place] (Swallow Short Term Goal 2) with minimal cues (75-90% accuracy)  -PF    Time Frame (Swallow Short Term Goal 2) 1 week  -PF    Progress/Outcomes (Swallow Short Term Goal 2) new goal  -PF              User Key  (r) = Recorded By, (t) = Taken By, (c) = Cosigned By      Initials Name Provider Type    PF Fakto, Prang[place], SLP Speech and Language Pathologist               LUIS Tuttle  11/20/2024    Electronically signed by Betty Pino SLP at 11/20/24 1523     Diane Miranda PT   Physical Therapist  Specialty:  Physical Therapy  Therapy Treatment Note      Signed  Date of Service:  11/19/24 1319  Creation Time:  11/19/24 1319     Signed        Subjective: Pt agreeable to therapeutic plan of care.     Objective:      Precautions - falls     Bed mobility - Mod-A and Assist x 2; pt able to initiate bringing BLE off EOB, strength and reduced motor planning limiting executing rolling or coming from sidelying>sit. Repositioning pad used.     Transfers - Mod-A and Assist x 2 to Min-A x 2. Pt requires verbal and tactile cues, momentum, to facilitate anterior weight shift for successful STS. Pt has difficulty with lateral weight-shifting and ability to clear foot during stand-pivot.     Balance - Min-A x 2 for standing static and dynamic balance. Improved postural control with less observed posterior lean.     Ambulation - minimal shuffled steps during stand-pivot transfer     Therapeutic Exercise - 10 Reps B LE AROM lying supine and unsupported sitting / EOB: ankle pumps, knee to chest, SLR, LAQ, sit to stands     Vitals: WNL     Pain: 0 VAS   Location: N/A  Intervention for pain: N/A     Education: Provided education on the importance of mobility in the acute care setting, Verbal/Tactile Cues, Transfer Training, and Energy  "conservation strategies     Assessment: Sathya Flores presents with functional mobility impairments which indicate the need for skilled intervention. Pt demonstrates improvement in mobility this date, however continuing to demonstrate posterior lean, bradykinesia, and poor weight-shifting with transfers. Pt able to tolerate and is more attentive during session, reporting he feels better. Continue to benefit from SNF due to deficits and Parkinsonian-like symptoms. Tolerating session today without incident. Will continue to follow and progress as tolerated.      Plan/Recommendations:   If medically appropriate, Moderate Intensity Therapy recommended post-acute care. This is recommended as therapy feels the patient would require 3-4 days per week and wouldn't tolerate \"3 hour daily\" rehab intensity. SNF would be the preferred choice. If the patient does not agree to SNF, arrange HH or OP depending on home bound status. If patient is medically complex, consider LTACH. Pt requires no DME at discharge.      Pt desires Skilled Rehab placement at discharge. Pt cooperative; agreeable to therapeutic recommendations and plan of care.            Basic Mobility 6-click:  Rollin = Total, A lot = 2, A little = 3; 4 = None  Supine>Sit:                      1 = Total, A lot = 2, A little = 3; 4 = None   Sit>Stand with arms:       1 = Total, A lot = 2, A little = 3; 4 = None  Bed>Chair:                      1 = Total, A lot = 2, A little = 3; 4 = None  Ambulate in room:           1 = Total, A lot = 2, A little = 3; 4 = None  3-5 Steps with railin = Total, A lot = 2, A little = 3; 4 = None  Score: 10     Modified [place]: N/A = No pre-op stroke/TIA     Post-Tx Position: Up in Chair, Alarms activated, and Call light and personal items within reach  PPE: gloves, surgical mask, and gown     Therapy Charges for Today         Code Description Service Date Service Provider Modifiers Qty     " 24935655667 HC PT THERAPEUTIC ACT EA 15 MIN 2024 Diane Miranda, PT GP 1     31441470283 HC PT THER PROC EA 15 MIN 2024 Diane Miranda, PT GP 1     51766503025 HC PT NEUROMUSC RE EDUCATION EA 15 MIN 2024 Diane Miranda, PT GP 1     85827104793 HC PT THERAPEUTIC ACT EA 15 MIN 2024 Diane Miranda, PT GP 1     25587351019 HC PT THER PROC EA 15 MIN 2024 Diane Miranda, PT GP 1     38995903211 HC PT NEUROMUSC RE EDUCATION EA 15 MIN 2024 Diane Miranda, PT GP 1               PT Charges         Row Name 24 1318                       Time Calculation     Start Time 1109  -OD         Stop Time 1137  -OD         Time Calculation (min) 28 min  -OD         PT Received On 24  -OD         PT - Next Appointment 24  -OD                 Time Calculation- PT     Total Timed Code Minutes- PT 28 minute(s)  -OD                      User Key  (r) = Recorded By, (t) = Taken By, (c) = Cosigned By        Initials Name Provider Type     OD Diane Miranda, PT Physical Therapist                              Travis Delgado, PT   Physical Therapist  Specialty:  Physical Therapy  Therapy Evaluation      Signed  Date of Service:  24  Creation Time:  24     Signed        Expand All Collapse All  Patient Name: Sathya Flores                   : 1930                          MRN: [id number]                              Today's Date: 2024                                 Admit Date: 2024                      Visit Dx:   Visit Diagnosis       ICD-10-CM ICD-9-CM   1. Acute congestive heart failure, unspecified heart failure type  I50.9 428.0   2. Elevated troponin  R79.89 790.6   3. Atrial fibrillation, unspecified type  I48.91 427.31         Problem List       Patient Active Problem List   Diagnosis    CHF (congestive heart failure)         Medical History   History reviewed. No pertinent past medical history.     Surgical History   History reviewed. No pertinent  surgical history.       General Information         Row Name 11/17/24 1731                 Physical Therapy Time and Intention     Document Type evaluation  -EL       Mode of Treatment individual therapy;physical therapy  -EL          Row Name 11/17/24 1731                 General Information     Patient Profile Reviewed yes  -EL       Prior Level of Function independent:;all household mobility;ADL's  -EL          Row Name 11/17/24 1731                 Living Environment     People in Home alone  -EL          Row Name 11/17/24 1731                 Home Main Entrance     Number of Stairs, Main Entrance three  -EL          Row Name 11/17/24 1731                 Stairs Within Home, Primary     Number of Stairs, Within Home, Primary none  -EL          Row Name 11/17/24 1731                 Cognition     Orientation Status (Cognition) oriented x 4  -EL          Row Name 11/17/24 1731                 Safety Issues/Impairments Affecting Functional Mobility     Impairments Affecting Function (Mobility) balance;endurance/activity tolerance;strength  -EL                       User Key  (r) = Recorded By, (t) = Taken By, (c) = Cosigned By        Initials Name Provider Type     EL Travis Delgado PT Physical Therapist                            Mobility         Row Name 11/17/24 1735                 Bed Mobility     Bed Mobility supine-sit;sit-supine  -EL       Supine-Sit [place] (Bed Mobility) moderate assist (50% patient effort);2 person assist  -EL       Sit-Supine [place] (Bed Mobility) moderate assist (50% patient effort);2 person assist  -EL          Row Name 11/17/24 1735                 Sit-Stand Transfer     Sit-Stand [place] (Transfers) moderate assist (50% patient effort);2 person assist  -EL       Assistive Device (Sit-Stand Transfers) walker, front-wheeled  -EL       Comment, (Sit-Stand Transfer) Reports using lift chair at baseline  -EL                       User Key  (r) = Recorded By, (t) = Taken By,  (c) = Cosigned By        Initials Name Provider Type     Travis Kennedy, PT Physical Therapist                            Obj/Interventions         Row Name 11/17/24 1736                 Range of Motion Comprehensive     General Range of Motion bilateral lower extremity ROM WFL  -          Row Name 11/17/24 1736                 Strength Comprehensive (MMT)     General Manual Muscle Testing (MMT) Assessment lower extremity strength deficits identified  -EL       Comment, General Manual Muscle Testing (MMT) Assessment BLE 3+/5 gross  -EL          Row Name 11/17/24 1736                 Balance     Balance Assessment standing static balance;sitting static balance  -EL       Static Sitting Balance contact guard  -EL       Static Standing Balance minimal assist  -EL                       User Key  (r) = Recorded By, (t) = Taken By, (c) = Cosigned By        Initials Name Provider Type     Travis Kennedy, PT Physical Therapist                            Goals/Plan         Row Name 11/17/24 1748                 Bed Mobility Goal 1 (PT)     Activity/Assistive Device (Bed Mobility Goal 1, PT) bed mobility activities, all  -EL       [place] Level/Cues Needed (Bed Mobility Goal 1, PT) minimum assist (75% or more patient effort)  -EL       Time Frame (Bed Mobility Goal 1, PT) long term goal (LTG);2 weeks  -          Row Name 11/17/24 1748                 Transfer Goal 1 (PT)     Activity/Assistive Device (Transfer Goal 1, PT) transfers, all;walker, rolling  -EL       [place] Level/Cues Needed (Transfer Goal 1, PT) minimum assist (75% or more patient effort)  -EL       Time Frame (Transfer Goal 1, PT) long term goal (LTG);2 weeks  -          Row Name 11/17/24 1748                 Gait Training Goal 1 (PT)     Activity/Assistive Device (Gait Training Goal 1, PT) gait (walking locomotion);walker, rolling  -EL       [place] Level (Gait Training Goal 1, PT) minimum assist (75% or more patient effort)  -EL        Distance (Gait Training Goal 1, PT) 50  -EL       Time Frame (Gait Training Goal 1, PT) long term goal (LTG);2 weeks  -EL          Row Name 11/17/24 5170                 Therapy Assessment/Plan (PT)     Planned Therapy Interventions (PT) neuromuscular re-education;balance training;bed mobility training;transfer training;gait training;patient/family education;strengthening  -EL                    User Key  (r) = Recorded By, (t) = Taken By, (c) = Cosigned By        Initials Name Provider Type     Travis Kennedy, PT Physical Therapist                         Clinical Impression         Row Name 11/17/24 1740                 Pain     Pretreatment Pain Rating 0/10 - no pain  -EL       Posttreatment Pain Rating 0/10 - no pain  -EL          Row Name 11/17/24 1740                 Plan of Care Review     Plan of Care Reviewed With patient  -EL       Outcome Evaluation Pt is a 89 YO M admitted with new onset CHF. Pt reports living at home alone, where he is independent with ADLs, but states he has had difficulty bathing. Typically ambulating with RWx reports having no recent falls, and states he uses a lift chair to come to standing. Pt reports he has been sleeping in lift chair recent as well.. Pt this date demonstrates significant weakness, requiring AX2 for bed mobiltiy and to come to standing. Pt is well below functional baseline and does not appear safe to return home alone, recommendation is SNF at d/c.  -EL          Row Name 11/17/24 1740                 Therapy Assessment/Plan (PT)     Rehab Potential (PT) good  -EL       Criteria for Skilled Interventions Met (PT) yes  -EL       Therapy Frequency (PT) 5 times/wk  -EL       Predicted Duration of Therapy Intervention (PT) Until d/c  -EL          Row Name 11/17/24 1740                 Vital Signs     O2 Delivery Pre Treatment room air  -EL       O2 Delivery Intra Treatment room air  -EL       O2 Delivery Post Treatment room air  -EL       Pre Patient Position Supine   -EL       Intra Patient Position Standing  -EL       Post Patient Position Supine  -EL          Row Name 11/17/24 1740                 Positioning and Restraints     Pre-Treatment Position in bed  -EL       Post Treatment Position bed  -EL       In Bed notified nsg;supine;call light within reach;encouraged to call for assist;exit alarm on  -EL                       User Key  (r) = Recorded By, (t) = Taken By, (c) = Cosigned By        Initials Name Provider Type     Travis Kennedy, EMMETT Physical Therapist                            Outcome Measures         Row Name 11/17/24 1752                 How much help from another person do you currently need...     Turning from your back to your side while in flat bed without using bedrails? 3  -EL       Moving from lying on back to sitting on the side of a flat bed without bedrails? 2  -EL       Moving to and from a bed to a chair (including a wheelchair)? 2  -EL       Standing up from a chair using your arms (e.g., wheelchair, bedside chair)? 2  -EL       Climbing 3-5 steps with a railing? 1  -EL       To walk in hospital room? 1  -EL       AM-PAC 6 Clicks Score (PT) 11  -EL       Highest Level of Mobility Goal 4 --> Transfer to chair/commode  -EL          Row Name 11/17/24 7122                 Functional Assessment     Outcome Measure Options AM-PAC 6 Clicks Basic Mobility (PT)  -EL                       User Key  (r) = Recorded By, (t) = Taken By, (c) = Cosigned By        Initials Name Provider Type     Travis Kennedy, PT Physical Therapist                          Physical Therapy Education            Title: PT OT SLP Therapies (Done)         Topic: Physical Therapy (Done)         Point: Mobility training (Done)         Learning Progress Summary             Patient Acceptance, E,TB, VU by BELTRAN at 11/17/2024 1755                            Point: Precautions (Done)         Learning Progress Summary             Patient Acceptance, E,TB, VU by BELTRAN at 11/17/2024 1755                                                 User Key         Initials Effective Dates Name Provider Type Discipline      06/23/20 -  Travis Delgado PT Physical Therapist PT                          PT Recommendation and Plan  Planned Therapy Interventions (PT): neuromuscular re-education, balance training, bed mobility training, transfer training, gait training, patient/family education, strengthening  Outcome Evaluation: Pt is a 89 YO M admitted with new onset CHF. Pt reports living at home alone, where he is independent with ADLs, but states he has had difficulty bathing. Typically ambulating with RWx reports having no recent falls, and states he uses a lift chair to come to standing. Pt reports he has been sleeping in lift chair recent as well.. Pt this date demonstrates significant weakness, requiring AX2 for bed mobiltiy and to come to standing. Pt is well below functional baseline and does not appear safe to return home alone, recommendation is SNF at d/c.      Time Calculation:   PT Evaluation Complexity  History, PT Evaluation Complexity: 1-2 personal factors and/or comorbidities  Examination of Body Systems (PT Eval Complexity): total of 3 or more elements  Clinical Presentation (PT Evaluation Complexity): evolving  Clinical Decision Making (PT Evaluation Complexity): moderate complexity  Overall Complexity (PT Evaluation Complexity): moderate complexity       PT Charges         Row Name 11/17/24 2775                       Time Calculation     Start Time 0914  -EL         Stop Time 0940  -EL         Time Calculation (min) 26 min  -EL         PT Received On 11/17/24  -EL         PT - Next Appointment 11/19/24  -EL         PT Goal Re-Cert Due Date 12/01/24  -                      User Key  (r) = Recorded By, (t) = Taken By, (c) = Cosigned By        Initials Name Provider Type     Travis Kennedy PT Physical Therapist                       Therapy Charges for Today         Code Description Service Date Service Provider  Modifiers Qty     09761250932 HC PT EVAL MOD COMPLEXITY 4 2024 Travis Delgado, PT GP 1                PT G-Codes  Outcome Measure Options: AM-PAC 6 Clicks Basic Mobility (PT)  AM-PAC 6 Clicks Score (PT): 11  PT Discharge Summary  Anticipated Discharge Disposition (PT): skilled nursing facility     Travis Delgado, EMMETT             2024                                  Ean Kumar OT   Occupational Therapist  Specialty:  Occupational Therapy  Therapy Treatment Note      Signed  Date of Service:  24  Creation Time:  24     Signed        Subjective: Pt agreeable to therapeutic plan of care.  Cognition: oriented to Person     Objective:      Precautions - High Fall Risk     Bed Mobility: Assist x 2 and Dependent scoot HOB  Functional Transfers: N/A or Not attempted.  Functional Ambulation: N/A or Not attempted.     Grooming: Max-A  ADL Position: supine, HOB elevated  ADL Comments: face hygiene and apply chap stick     Vitals: Desaturates with min exertion, however poor pleth     Pain: 0 VAS  Location: N/A  Interventions for pain: N/A  Education: Provided education on the importance of mobility in the acute care setting, Verbal/Tactile Cues, and ADL training     Assessment: Sathya Flores presents with ADL impairments affecting function including balance, cognition, coordination, endurance / activity tolerance, grasp, motor control, motor planning, and strength. Pt lethargic this date, oriented to name and . Pt desats with min exertion, though poor pleth. Pt requires max assist to complete face hygiene and apply chap stick with RUE. Tremors observed in BUE when completing BUE stretching/ADLs. Demonstrated functioning below baseline abilities indicate the need for continued skilled intervention while inpatient. Tolerating session today without incident. Will continue to follow and progress as tolerated.      Plan/Recommendations:   Moderate Intensity Therapy recommended post-acute care. This  "is recommended as therapy feels the patient would require 3-4 days per week and wouldn't tolerate \"3 hour daily\" rehab intensity. SNF would be the preferred choice. If the patient does not agree to SNF, arrange HH or OP depending on home bound status. If patient is medically complex, consider LTACH.     Pt desires Skilled Rehab placement at discharge. Pt cooperative; agreeable to therapeutic recommendations and plan of care.      Modified Lane: N/A = No pre-op stroke/TIA     Post-Tx Position: Supine with HOB Elevated, Alarms activated, and Call light and personal items within reach in chair position, family friend preset in room  PPE: gloves, surgical mask, and gown     Therapy Charges for Today         Code Description Service Date Service Provider Modifiers Qty     11214489631 HC OT SELF CARE/MGMT/TRAIN EA 15 MIN 11/20/2024 Ean Kumar OT GO 1     10369517701 HC OT NEUROMUSC RE EDUCATION EA 15 MIN 11/20/2024 Ean Kumar OT GO 1               Time Calculation- OT         Row Name 11/20/24 1425                       Time Calculation- OT     OT Start Time 1337  -SP         OT Stop Time 1354  -SP         OT Time Calculation (min) 17 min  -SP         Total Timed Code Minutes- OT 17 minute(s)  -SP         OT Received On 11/20/24  -SP         OT - Next Appointment 11/21/24  -SP                      User Key  (r) = Recorded By, (t) = Taken By, (c) = Cosigned By        Initials Name Provider Type     SP Ean Kumar, OT Occupational Therapist                                   "

## 2024-11-20 NOTE — PLAN OF CARE
Assessment: Sathya Flores presents with increased fatigue and enhanced Parkinsonian symptoms this date. Pt with soft and slow speech, bradykinesia with ther ex, and rigidity BLE. Declined transfers this date as pt recently returned to bed, so instructed through bed exercises and repositioned in chair position in bed. Pt would potentially benefit from neurology consult as pt appears limited due to clinical presentation. Tolerating session today without incident. Will continue to follow and progress as tolerated.

## 2024-11-20 NOTE — MBS/VFSS/FEES
Acute Care - Speech Language Pathology   Swallow Initial Evaluation  [facility]     Patient Name: Sathya Flores  : 1930  MRN: [id number]  Today's Date: 2024               Admit Date: 2024    Visit Dx:     ICD-10-CM ICD-9-CM   1. Acute congestive heart failure, unspecified heart failure type  I50.9 428.0   2. Elevated troponin  R79.89 790.6   3. Atrial fibrillation, unspecified type  I48.91 427.31     Patient Active Problem List   Diagnosis    CHF (congestive heart failure)     History reviewed. No pertinent past medical history.  History reviewed. No pertinent surgical history.    SLP Recommendation and Plan  SLP Swallowing Diagnosis: moderate;oral dysphagia; mild/mod pharyngeal dysphagia (24)  SLP Diet Recommendation: mech soft w/ nectar thick liquids (NTL), no straw  (24)  Recommended Precautions and Strategies: upright posture during/after eating (24)  SLP Rec. for Method of Medication Administration: whole in NTL, or whole or crushed in puree, or as tolrated  (24)  Monitor for Signs of Aspiration: yes, notify SLP if any concerns (24)  Recommended Diagnostics: reassess via clinical swallow evaluation and/or instrumental exam (MBS) (24)  Swallow Criteria for Skilled Therapeutic Interventions Met: demonstrates skilled criteria (24)  Rehab Potential/Prognosis, Swallowing: adequate, monitor progress closely (24)  Therapy Frequency (Swallow): 3 days per week, 4 days per week (24)  Predicted Duration Therapy Intervention (Days): until discharge (24)  Oral Care Recommendations: Oral Care BID/PRN, Before ice/water (24)     SWALLOW EVALUATION (Last 72 Hours)       SLP Adult Swallow Evaluation       Row Name 24       Rehab Evaluation    Document Type evaluation  -PF    Subjective Information no complaints  -PF    Patient Observations alert;cooperative  -PF    Patient Effort  good  -PF       General Information    Patient Profile Reviewed yes  -PF    Current Method of Nutrition regular textures;thin liquids  -PF    Precautions/Limitations, Vision WFL;for purposes of eval  -PF    Precautions/Limitations, Hearing WFL;for purposes of eval  -PF    Prior Level of Function-Communication WFL  -PF    Prior Level of Function-Swallowing regular textures;thin liquids  -PF    Plans/Goals Discussed with patient;family  -PF    Barriers to Rehab none identified  -PF       Oral Motor Structure and Function    Dentition Assessment natural, present and adequate  -PF    Secretion Management WNL/WFL  -PF    Mucosal Quality moist, healthy  -PF       Oral Musculature and Cranial Nerve Assessment    Oral Motor General Assessment generalized oral motor weakness  -PF       General Eating/Swallowing Observations    Respiratory Support Currently in Use nasal cannula  -PF    O2 Liters 3L  -PF    Eating/Swallowing Skills fed by staff/caregiver  -PF    Positioning During Eating upright 90 degree;upright in chair  -PF    Utensils Used spoon;cup;straw  -PF    Consistencies Trialed regular textures;soft to chew textures;mechanical ground textures;pureed;thin liquids  -PF       MBS/VFSS Interpretation    VFSS Summary VFSS completed this date. The patient was standing upright at a 90-degree angle and viewed in the lateral projection. Pt self-fed all trials as provided by SLP respectively: NT by cup x2, NT by straw sequential sips x1, puree (applesauce) x2, mechanical soft/mixed consistency (peaches) x2, NT wash by cup, thin by spoon x2, and thin by cup x2 to complete the study.     Tremulous lingual movement. Complete labial seal resulting in no anterior spillage on any trial or consistency.  Premature spillage over the BOT and onto the valleculae on all consistencies prior to the initiation of pharyngeal swallow. Pt w/ natural dentition. Pt demonstrated anterior rotary and slow mastication, but adequate, on [facility] soft  solids.   A-P reduced. Swallow initiation timely.  Adequate laryngeal elevation and reduced forward hyoid movement.  Trace to min lingual residue and mild/mod vallecular residue noted after the swallow on all trials and consistencies. Reduced  approximation of PPW and BOT. Epiglottic inversion inconsistently deflects with certain consistencies and dependent upon bolus size. Anterior/curved cervical spine C1-C6 appears to impact epiglottic's ability to invert consistently and adequately during swallow. Vestibular closure adequate on NT by cup, puree, peaches; No penetration of aspiration observed; Material does not enter airway (a 1 on the [place] Penetration-Aspiration Scale). Epiglottic inversion incomplete on NT by straw sequential sip and thins. Pt demonstrated penetration on NT by straw sequential sip that does not clear after the swallow. Pt unable to cough or throat clear when prompted. Material enters the airway, remains above the vocal folds, and is not ejected from the airway (a 3 on the [place] Penetration-Aspiration Scale). Deep penetration on all trials of thin by cup w/ suspected eventual aspiration to and below the vocal folds as pt demonstrated a cough after thin trials acceptance. Esophageal scan was unremarkable.     Recommendations:       - Mech soft w/ nectar thick liquids (NTL), no straw; feeding assistance, [place] Water Protocol (see guidelines below)   - Meds: whole in NTL or puree, or as tolerated    - General reflux and aspiration precautions     - Safe swallow strategies: HOB at a 90 degree angle, slow rate of intake, small bites/sips, alternate liquid and solid     - ST will continue to follow to ensure tolerance and safety of rec'd diet, provide further recs as indicated.     Water Protocol:  The rationale to recommend water when a PO diet cannot appropriately/functionally sustain nutrition is because water is low risk for aspiration pna when compared to aspiration of food or other  liquids.    Benefits of a water protocol include but are not limited to:     Oral gratification  Engagement of oropharyngeal swallow musculature  Decrease likelihood of dehydration       Guidelines for proper implementation include:  Thorough oral care prior to consuming water  Upright at 90 degree hip flexion  Small sips at slow rate  Monitor for any changes in respiratory status and discontinue if distress noted     The [place] Free Water Protocol is a research-based protocol established in 1984       Penetration of thin by cup that does not clear after the swallow w/ suspected eventual aspiration below the vocal folds       -PF       SLP Evaluation Clinical Impression    SLP Swallowing Diagnosis moderate;oral dysphagia; mild/mod pharyngeal dysphagia  -PF    Functional Impact risk of aspiration/pneumonia;risk of malnutrition;risk of dehydration  -PF    Rehab Potential/Prognosis, Swallowing adequate, monitor progress closely  -PF    Swallow Criteria for Skilled Therapeutic Interventions Met demonstrates skilled criteria  -PF       Recommendations    Therapy Frequency (Swallow) 3 days per week;4 days per week  -PF    Predicted Duration Therapy Intervention (Days) until discharge  -PF    SLP Diet Recommendation NPO  -PF    Recommended Diagnostics VFSS (MBS)  -PF    Recommended Precautions and Strategies upright posture during/after eating  -PF    Oral Care Recommendations Oral Care BID/PRN;Before ice/water  -PF    SLP Rec. for Method of Medication Administration meds via alternate route  -PF    Monitor for Signs of Aspiration yes;notify SLP if any concerns  -PF       Swallow Goals (SLP)    Swallow LTGs Swallow Long Term Goal (free text)  -PF    Swallow STGs diet tolerance goal selection (SLP)  -PF    Diet Tolerance Goal Selection (SLP) Swallow Short Term Goal 1;Swallow Short Term Goal 2  -PF       (LTG) Swallow    (LTG) Swallow Pt will maximize swallow function for least restrictive PO diet, exhibiting no  complication associated with dysphagia, adequate PO intake, and demonstrating independent use of swallow compensation.  -PF    [place] (Swallow Long Term Goal) with minimal cues (75-90% accuracy)  -PF    Time Frame (Swallow Long Term Goal) by discharge  -PF    Progress/Outcomes (Swallow Long Term Goal) new goal  -PF       (STG) Swallow 1    (STG) Swallow 1 Patient will participate in ongoing assessment of swallow, including reevaluation clinically and/or including instrumental assessment of swallow if indicated, to further assess swallow function and return to oral nutrition.  -PF    [place] (Swallow Short Term Goal 1) with minimal cues (75-90% accuracy)  -PF    Time Frame (Swallow Short Term Goal 1) 1 week  -PF    Progress/Outcomes (Swallow Short Term Goal 1) new goal  -PF       (STG) Swallow 2    (STG) Swallow 2 The patient will participate in a full meal assessment to determine safety and adequacy of recommended diet, independent use of safe swallow compensations, and additional goals/recommendations to follow.  -PF    [place] (Swallow Short Term Goal 2) with minimal cues (75-90% accuracy)  -PF    Time Frame (Swallow Short Term Goal 2) 1 week  -PF    Progress/Outcomes (Swallow Short Term Goal 2) new goal  -PF              User Key  (r) = Recorded By, (t) = Taken By, (c) = Cosigned By      Initials Name Effective Dates    PF Betty Pino SLP 05/08/23 -                     EDUCATION  The patient has been educated in the following areas:   Dysphagia (Swallowing Impairment) Oral Care/Hydration Modified Diet Instruction.        SLP GOALS       Row Name 11/20/24 0900       (LTG) Swallow    (LTG) Swallow Pt will maximize swallow function for least restrictive PO diet, exhibiting no complication associated with dysphagia, adequate PO intake, and demonstrating independent use of swallow compensation.  -PF    [place] (Swallow Long Term Goal) with minimal cues (75-90% accuracy)  -PF    Time Frame  (Swallow Long Term Goal) by discharge  -PF    Progress/Outcomes (Swallow Long Term Goal) new goal  -PF       (STG) Swallow 1    (STG) Swallow 1 Patient will participate in ongoing assessment of swallow, including reevaluation clinically and/or including instrumental assessment of swallow if indicated, to further assess swallow function and return to oral nutrition.  -PF    [place] (Swallow Short Term Goal 1) with minimal cues (75-90% accuracy)  -PF    Time Frame (Swallow Short Term Goal 1) 1 week  -PF    Progress/Outcomes (Swallow Short Term Goal 1) new goal  -PF       (STG) Swallow 2    (STG) Swallow 2 The patient will participate in a full meal assessment to determine safety and adequacy of recommended diet, independent use of safe swallow compensations, and additional goals/recommendations to follow.  -PF    [place] (Swallow Short Term Goal 2) with minimal cues (75-90% accuracy)  -PF    Time Frame (Swallow Short Term Goal 2) 1 week  -PF    Progress/Outcomes (Swallow Short Term Goal 2) new goal  -PF              User Key  (r) = Recorded By, (t) = Taken By, (c) = Cosigned By      Initials Name Provider Type    PF Fakto, [place], SLP Speech and Language Pathologist               LUIS Tuttle  11/20/2024

## 2024-11-20 NOTE — CASE MANAGEMENT/SOCIAL WORK
"Continued Stay Note  [facility]     Patient Name: Sathya Flores  MRN: [id number]  Today's Date: 11/20/2024    Admit Date: 11/16/2024    Plan: [facility] skilled (pending), PASRR approved, precert required   Discharge Plan       Row Name 11/20/24 0852       Plan    Plan [facility] skilled (pending), PASRR approved, precert required    Plan Comments [place] declined for SNF due to \"out of network\".  [facility] and notified liaison Wendy. [place] Barriers: NPO for swallow study, 3lpm O2, continues IV unasyn, cardio monitoring weights/BP               Elza Esquivel RN      [facility]  Office: [phone]  Cell: [phone]  Fax # [phone]    "

## 2024-11-20 NOTE — PROGRESS NOTES
Daily Progress Note        CHF (congestive heart failure)    Assessment:    Acute hypoxic respiratory insufficiency    Elevated troponin and proBNP  Iron deficiency    UTI  >100,000 CFU/mL Klebsiella oxytoca       Viral panel positive for parainfluenza  MRSA DNA probe positive     Recommendations:     Oxygen titration currently on 2 L nasal cannula    Low-dose steroids IV Solu-Medrol 20 mg daily    antibiotic Unasyn    DVT prophylaxis Lovenox 40 mg subcu daily    Protonix 40 mg daily                     LOS: 4 days     Subjective         Objective     Vital signs for last 24 hours:  Vitals:    11/19/24 2355 11/20/24 0400 11/20/24 0730 11/20/24 0735   BP: 114/63 113/62     BP Location: Right arm Right arm     Patient Position: Lying Lying     Pulse: 83 96 93 101   Resp: 25 (!) 32 20    Temp: 98 °F (36.7 °C) 98.3 °F (36.8 °C)     TempSrc: Oral Oral     SpO2: 99% 96% 99% 96%   Weight:  82.5 kg (181 lb 14.1 oz)     Height:           Intake/Output last 3 shifts:  I/O last 3 completed shifts:  In: 480 [P.O.:480]  Out: 2200 [Urine:2200]  Intake/Output this shift:  No intake/output data recorded.      Radiology  Imaging Results (Last 24 Hours)       Procedure Component Value Units Date/Time    XR Chest 1 View [[id number]] Collected: 11/19/24 0952     Updated: 11/19/24 0956    Narrative:      XR CHEST 1 VW    Date of Exam: 11/19/2024 9:46 AM EST    Indication: shortness of breath    Comparison: 11/16/2024 chest radiograph, 11/17/2024 chest CT    Findings:  Mild scattered patchy areas of airspace disease concerning for pneumonia. Underlying emphysema with chronic interstitial lung disease similar to prior studies. No pneumothorax. Severe bilateral glenohumeral osteoarthritis. Heart size top normal,   exaggerated by technique. Aortic arch atherosclerosis. No pneumothorax or pleural effusion.      Impression:      Impression:  1. Mild scattered patchy areas of airspace disease concerning for pneumonia.  2. Underlying emphysema  and chronic interstitial lung disease.        Electronically Signed: Landen Mock MD    11/19/2024 9:54 AM EST    Workstation ID: [id number]            Labs:  Results from last 7 days   Lab Units 11/20/24  0305   WBC 10*3/mm3 10.06   HEMOGLOBIN g/dL 11.3*   HEMATOCRIT % 35.5*   PLATELETS 10*3/mm3 160     Results from last 7 days   Lab Units 11/20/24  0305 11/16/24  1815 11/16/24  1238   SODIUM mmol/L 137   < > 136   POTASSIUM mmol/L 4.6   < > 4.4   CHLORIDE mmol/L 101   < > 101   CO2 mmol/L 28.7   < > 28.4   BUN mg/dL 28*   < > 29*   CREATININE mg/dL 0.85   < > 0.94   CALCIUM mg/dL 8.9   < > 8.9   BILIRUBIN mg/dL  --   --  0.2   ALK PHOS U/L  --   --  116   ALT (SGPT) U/L  --   --  21   AST (SGOT) U/L  --   --  18   GLUCOSE mg/dL 91   < > 108*    < > = values in this interval not displayed.         Results from last 7 days   Lab Units 11/16/24  1238   ALBUMIN g/dL 3.3*     Results from last 7 days   Lab Units 11/16/24  1409 11/16/24  1238   HSTROP T ng/L 54* 56*         Results from last 7 days   Lab Units 11/19/24  0446   MAGNESIUM mg/dL 2.0         Results from last 7 days   Lab Units 11/16/24  1815   TSH uIU/mL 0.992           Meds:   SCHEDULE  amLODIPine, 5 mg, Oral, Daily  ampicillin-sulbactam, 3 g, Intravenous, Q6H  brimonidine, 1 drop, Right Eye, Q12H   And  timolol, 1 drop, Right Eye, Q12H  cloNIDine, 0.1 mg, Oral, BID  enoxaparin, 40 mg, Subcutaneous, Q24H  guaiFENesin, 1,200 mg, Oral, Q12H  ipratropium-albuterol, 3 mL, Nebulization, TID - RT  latanoprost, 1 drop, Right Eye, Nightly  methylPREDNISolone sodium succinate, 20 mg, Intravenous, Daily  metoprolol succinate XL, 12.5 mg, Oral, Q24H  pantoprazole, 40 mg, Oral, Q AM  primidone, 250 mg, Oral, TID  sodium chloride, 10 mL, Intravenous, Q12H      Infusions  Pharmacy to Dose enoxaparin (LOVENOX),       PRNs    acetaminophen **OR** acetaminophen **OR** acetaminophen    senna-docusate sodium **AND** polyethylene glycol **AND** bisacodyl **AND** bisacodyl     Calcium Replacement - Follow Nurse / BPA Driven Protocol    hydrALAZINE    Magnesium Standard Dose Replacement - Follow Nurse / BPA Driven Protocol    ondansetron ODT **OR** ondansetron    Pharmacy to Dose enoxaparin (LOVENOX)    Phosphorus Replacement - Follow Nurse / BPA Driven Protocol    Potassium Replacement - Follow Nurse / BPA Driven Protocol    [COMPLETED] Insert peripheral IV **AND** sodium chloride    sodium chloride    sodium chloride    Physical Exam:  Physical Exam  Cardiovascular:      Heart sounds: Murmur heard.      No gallop.   Pulmonary:      Effort: No respiratory distress.      Breath sounds: No stridor. Rhonchi and rales present. No wheezing.   Chest:      Chest wall: No tenderness.         ROS  Review of Systems   Respiratory:  Positive for cough and shortness of breath. Negative for wheezing and stridor.    Cardiovascular:  Positive for palpitations. Negative for chest pain and leg swelling.             Total time spent with patient greater than: 45 Minutes

## 2024-11-20 NOTE — PLAN OF CARE
Goal Outcome Evaluation:      No new concerns overnight. Resting with eyes closed this evening. Turn with 1 staff assist. No c/o discomfort. Call light in reach.

## 2024-11-20 NOTE — CASE MANAGEMENT/SOCIAL WORK
Continued Stay Note  [facility]     Patient Name: Sathya Flores  MRN: [id number]  Today's Date: 11/20/2024    Admit Date: 11/16/2024    Plan: [facility] skilled (accepted), PASRR approved, precert required   Discharge Plan       Row Name 11/20/24 1329       Plan    Plan [facility] skilled (accepted), PASRR approved, precert required    Plan Comments [facility] liachris Nguyen updated that patient accepted for skilled rehabilitation, CM awaiting new PT/OT notes to start precert. Notified PT/OT as well.               Elza Esquivel RN     [facility]  Office: [phone]  Cell: [phone]  Fax # [phone]

## 2024-11-21 ENCOUNTER — APPOINTMENT (OUTPATIENT)
Dept: CT IMAGING | Facility: HOSPITAL | Age: 89
End: 2024-11-21
Payer: MEDICARE

## 2024-11-21 LAB
ANION GAP SERPL CALCULATED.3IONS-SCNC: 9.8 MMOL/L (ref 5–15)
BASOPHILS # BLD AUTO: 0.03 10*3/MM3 (ref 0–0.2)
BASOPHILS NFR BLD AUTO: 0.3 % (ref 0–1.5)
BUN SERPL-MCNC: 31 MG/DL (ref 8–23)
BUN/CREAT SERPL: 39.7 (ref 7–25)
CALCIUM SPEC-SCNC: 9 MG/DL (ref 8.2–9.6)
CHLORIDE SERPL-SCNC: 101 MMOL/L (ref 98–107)
CO2 SERPL-SCNC: 26.2 MMOL/L (ref 22–29)
CREAT SERPL-MCNC: 0.78 MG/DL (ref 0.76–1.27)
DEPRECATED RDW RBC AUTO: 52.9 FL (ref 37–54)
EGFRCR SERPLBLD CKD-EPI 2021: 82.6 ML/MIN/1.73
EOSINOPHIL # BLD AUTO: 0.04 10*3/MM3 (ref 0–0.4)
EOSINOPHIL NFR BLD AUTO: 0.4 % (ref 0.3–6.2)
ERYTHROCYTE [DISTWIDTH] IN BLOOD BY AUTOMATED COUNT: 14.2 % (ref 12.3–15.4)
GLUCOSE SERPL-MCNC: 111 MG/DL (ref 65–99)
HCT VFR BLD AUTO: 38.2 % (ref 37.5–51)
HGB BLD-MCNC: 12 G/DL (ref 13–17.7)
IMM GRANULOCYTES # BLD AUTO: 0.06 10*3/MM3 (ref 0–0.05)
IMM GRANULOCYTES NFR BLD AUTO: 0.6 % (ref 0–0.5)
LYMPHOCYTES # BLD AUTO: 1.6 10*3/MM3 (ref 0.7–3.1)
LYMPHOCYTES NFR BLD AUTO: 15.6 % (ref 19.6–45.3)
MCH RBC QN AUTO: 31.7 PG (ref 26.6–33)
MCHC RBC AUTO-ENTMCNC: 31.4 G/DL (ref 31.5–35.7)
MCV RBC AUTO: 100.8 FL (ref 79–97)
MONOCYTES # BLD AUTO: 1.45 10*3/MM3 (ref 0.1–0.9)
MONOCYTES NFR BLD AUTO: 14.2 % (ref 5–12)
NEUTROPHILS NFR BLD AUTO: 68.9 % (ref 42.7–76)
NEUTROPHILS NFR BLD AUTO: 7.06 10*3/MM3 (ref 1.7–7)
NRBC BLD AUTO-RTO: 0 /100 WBC (ref 0–0.2)
NT-PROBNP SERPL-MCNC: 2992 PG/ML (ref 0–1800)
PLATELET # BLD AUTO: 187 10*3/MM3 (ref 140–450)
PMV BLD AUTO: 10.5 FL (ref 6–12)
POTASSIUM SERPL-SCNC: 4.6 MMOL/L (ref 3.5–5.2)
RBC # BLD AUTO: 3.79 10*6/MM3 (ref 4.14–5.8)
SODIUM SERPL-SCNC: 137 MMOL/L (ref 136–145)
WBC NRBC COR # BLD AUTO: 10.24 10*3/MM3 (ref 3.4–10.8)

## 2024-11-21 PROCEDURE — 25010000002 AMPICILLIN-SULBACTAM PER 1.5 G: Performed by: INTERNAL MEDICINE

## 2024-11-21 PROCEDURE — 97530 THERAPEUTIC ACTIVITIES: CPT

## 2024-11-21 PROCEDURE — 94664 DEMO&/EVAL PT USE INHALER: CPT

## 2024-11-21 PROCEDURE — 94761 N-INVAS EAR/PLS OXIMETRY MLT: CPT

## 2024-11-21 PROCEDURE — 94799 UNLISTED PULMONARY SVC/PX: CPT

## 2024-11-21 PROCEDURE — 80048 BASIC METABOLIC PNL TOTAL CA: CPT | Performed by: INTERNAL MEDICINE

## 2024-11-21 PROCEDURE — 99221 1ST HOSP IP/OBS SF/LOW 40: CPT | Performed by: PSYCHIATRY & NEUROLOGY

## 2024-11-21 PROCEDURE — 97535 SELF CARE MNGMENT TRAINING: CPT

## 2024-11-21 PROCEDURE — 70450 CT HEAD/BRAIN W/O DYE: CPT

## 2024-11-21 PROCEDURE — 25010000002 ENOXAPARIN PER 10 MG: Performed by: FAMILY MEDICINE

## 2024-11-21 PROCEDURE — 83880 ASSAY OF NATRIURETIC PEPTIDE: CPT | Performed by: NURSE PRACTITIONER

## 2024-11-21 PROCEDURE — 92526 ORAL FUNCTION THERAPY: CPT

## 2024-11-21 PROCEDURE — 25010000002 METHYLPREDNISOLONE PER 40 MG: Performed by: INTERNAL MEDICINE

## 2024-11-21 PROCEDURE — 99232 SBSQ HOSP IP/OBS MODERATE 35: CPT | Performed by: INTERNAL MEDICINE

## 2024-11-21 PROCEDURE — 85025 COMPLETE CBC W/AUTO DIFF WBC: CPT | Performed by: INTERNAL MEDICINE

## 2024-11-21 RX ORDER — CARBIDOPA AND LEVODOPA 25; 100 MG/1; MG/1
1 TABLET ORAL 3 TIMES DAILY
Status: DISCONTINUED | OUTPATIENT
Start: 2024-11-21 | End: 2024-11-22 | Stop reason: HOSPADM

## 2024-11-21 RX ADMIN — PRIMIDONE 250 MG: 250 TABLET ORAL at 18:25

## 2024-11-21 RX ADMIN — AMPICILLIN SODIUM AND SULBACTAM SODIUM 3 G: 2; 1 INJECTION, POWDER, FOR SOLUTION INTRAMUSCULAR; INTRAVENOUS at 20:20

## 2024-11-21 RX ADMIN — BRIMONIDINE TARTRATE 1 DROP: 2 SOLUTION/ DROPS OPHTHALMIC at 22:37

## 2024-11-21 RX ADMIN — HYDROCHLOROTHIAZIDE 25 MG: 12.5 TABLET ORAL at 08:07

## 2024-11-21 RX ADMIN — LATANOPROST 1 DROP: 50 SOLUTION/ DROPS OPHTHALMIC at 22:37

## 2024-11-21 RX ADMIN — METHYLPREDNISOLONE SODIUM SUCCINATE 20 MG: 40 INJECTION, POWDER, FOR SOLUTION INTRAMUSCULAR; INTRAVENOUS at 07:58

## 2024-11-21 RX ADMIN — Medication 10 ML: at 07:57

## 2024-11-21 RX ADMIN — AMPICILLIN SODIUM AND SULBACTAM SODIUM 3 G: 2; 1 INJECTION, POWDER, FOR SOLUTION INTRAMUSCULAR; INTRAVENOUS at 01:28

## 2024-11-21 RX ADMIN — TIMOLOL MALEATE 1 DROP: 5 SOLUTION/ DROPS OPHTHALMIC at 22:37

## 2024-11-21 RX ADMIN — Medication 10 ML: at 22:37

## 2024-11-21 RX ADMIN — TIMOLOL MALEATE 1 DROP: 5 SOLUTION/ DROPS OPHTHALMIC at 08:07

## 2024-11-21 RX ADMIN — CLONIDINE HYDROCHLORIDE 0.1 MG: 0.1 TABLET ORAL at 22:38

## 2024-11-21 RX ADMIN — AMPICILLIN SODIUM AND SULBACTAM SODIUM 3 G: 2; 1 INJECTION, POWDER, FOR SOLUTION INTRAMUSCULAR; INTRAVENOUS at 14:23

## 2024-11-21 RX ADMIN — ENOXAPARIN SODIUM 40 MG: 100 INJECTION SUBCUTANEOUS at 18:24

## 2024-11-21 RX ADMIN — PRIMIDONE 250 MG: 250 TABLET ORAL at 22:37

## 2024-11-21 RX ADMIN — AMPICILLIN SODIUM AND SULBACTAM SODIUM 3 G: 2; 1 INJECTION, POWDER, FOR SOLUTION INTRAMUSCULAR; INTRAVENOUS at 07:57

## 2024-11-21 RX ADMIN — METOPROLOL SUCCINATE 12.5 MG: 25 TABLET, EXTENDED RELEASE ORAL at 08:06

## 2024-11-21 RX ADMIN — PRIMIDONE 250 MG: 250 TABLET ORAL at 08:06

## 2024-11-21 RX ADMIN — IPRATROPIUM BROMIDE AND ALBUTEROL SULFATE 3 ML: .5; 3 SOLUTION RESPIRATORY (INHALATION) at 06:45

## 2024-11-21 RX ADMIN — AMLODIPINE BESYLATE 5 MG: 5 TABLET ORAL at 08:06

## 2024-11-21 RX ADMIN — PANTOPRAZOLE SODIUM 40 MG: 40 TABLET, DELAYED RELEASE ORAL at 08:06

## 2024-11-21 RX ADMIN — GUAIFENESIN 1200 MG: 600 TABLET, EXTENDED RELEASE ORAL at 22:37

## 2024-11-21 RX ADMIN — BRIMONIDINE TARTRATE 1 DROP: 2 SOLUTION/ DROPS OPHTHALMIC at 08:07

## 2024-11-21 RX ADMIN — GUAIFENESIN 1200 MG: 600 TABLET, EXTENDED RELEASE ORAL at 08:06

## 2024-11-21 RX ADMIN — CLONIDINE HYDROCHLORIDE 0.1 MG: 0.1 TABLET ORAL at 08:07

## 2024-11-21 NOTE — PLAN OF CARE
"Assessment: Sathya Flores presents with functional mobility impairments which indicate the need for skilled intervention. Mr. Flores is A&Ox4 today. He displays Parkinsonian symptoms including, pill rolling tremor in RUE, severe posterior lean, flat effect, bradykinesia, and shuffling steps. Pt is mod-maxA supine to sit EOB. Pt is maxA for STS transfer from EOB and from chair to complete pericare (dep). He is modA for taking steps to chair with RW, verbal and tactile cues needed. Pt is very soft spoken and difficult to understand at times but very pleasant. Will continue to follow and progress as tolerated.     Plan/Recommendations:   If medically appropriate, Moderate Intensity Therapy recommended post-acute care. This is recommended as therapy feels the patient would require 3-4 days per week and wouldn't tolerate \"3 hour daily\" rehab intensity. SNF would be the preferred choice. If the patient does not agree to SNF, arrange HH or OP depending on home bound status. If patient is medically complex, consider LTACH. Pt requires no DME at discharge.                                       "

## 2024-11-21 NOTE — PROGRESS NOTES
[facility] CARDIOLOGY  [facility]        LOS:  LOS: 5 days   Patient Name: Sathya Flores  Age/Sex: 89 y.o. male  : 1930  MRN: [id number]    Day of Service: 24   Length of Stay: 5  Encounter Provider: Derek Vanegas MD  Place of Service: [facility] CARDIOLOGY  Patient Care Team:  Provider, No Known as PCP - Rogelio Khan MD as PCP - Family Medicine    Subjective:     Chief Complaint:  F/U HF    Subjective:   Patient reports breathing is fair.  Not coughing during exam today.    Current Medications:   Scheduled Meds:amLODIPine, 5 mg, Oral, Daily  ampicillin-sulbactam, 3 g, Intravenous, Q6H  brimonidine, 1 drop, Right Eye, Q12H   And  timolol, 1 drop, Right Eye, Q12H  cloNIDine, 0.1 mg, Oral, BID  enoxaparin, 40 mg, Subcutaneous, Q24H  guaiFENesin, 1,200 mg, Oral, Q12H  hydroCHLOROthiazide, 25 mg, Oral, Daily  ipratropium-albuterol, 3 mL, Nebulization, TID - RT  latanoprost, 1 drop, Right Eye, Nightly  methylPREDNISolone sodium succinate, 20 mg, Intravenous, Daily  metoprolol succinate XL, 12.5 mg, Oral, Q24H  pantoprazole, 40 mg, Oral, Q AM  primidone, 250 mg, Oral, TID  sodium chloride, 10 mL, Intravenous, Q12H      Continuous Infusions:Pharmacy to Dose enoxaparin (LOVENOX),         Allergies:  No Known Allergies    Review of Systems   Unable to perform ROS: Mental status change       Objective:     Temp:  [97.5 °F (36.4 °C)-100.3 °F (37.9 °C)] 97.8 °F (36.6 °C)  Heart Rate:  [] 99  Resp:  [20-39] 35  BP: ()/(47-82) 125/58     Intake/Output Summary (Last 24 hours) at 2024 1005  Last data filed at 2024  Gross per 24 hour   Intake 360 ml   Output 800 ml   Net -440 ml     Body mass index is 29.89 kg/m².      24  0405 24  0400 24  0600   Weight: 78.2 kg (172 lb 6.4 oz) 82.5 kg (181 lb 14.1 oz) 84 kg (185 lb 3 oz)         Physical Exam:  Neuro:  CV:  Resp:  GI:  Ext:  Tele: AAOx3,  "no gross deficits  S1S2 RRR, no murmur, +JVD  shallow, coarse  BS+, abd soft  Pedal pulses palp, trace BLE edema  SR with PACs                                                   Lab Review:   Results from last 7 days   Lab Units 11/21/24  0136 11/20/24  0305 11/16/24  1815 11/16/24  1238   SODIUM mmol/L 137 137   < > 136   POTASSIUM mmol/L 4.6 4.6   < > 4.4   CHLORIDE mmol/L 101 101   < > 101   CO2 mmol/L 26.2 28.7   < > 28.4   BUN mg/dL 31* 28*   < > 29*   CREATININE mg/dL 0.78 0.85   < > 0.94   GLUCOSE mg/dL 111* 91   < > 108*   CALCIUM mg/dL 9.0 8.9   < > 8.9   AST (SGOT) U/L  --   --   --  18   ALT (SGPT) U/L  --   --   --  21    < > = values in this interval not displayed.     Results from last 7 days   Lab Units 11/16/24  1409 11/16/24  1238   HSTROP T ng/L 54* 56*     Results from last 7 days   Lab Units 11/21/24  0136 11/20/24  0305   WBC 10*3/mm3 10.24 10.06   HEMOGLOBIN g/dL 12.0* 11.3*   HEMATOCRIT % 38.2 35.5*   PLATELETS 10*3/mm3 187 160         Results from last 7 days   Lab Units 11/19/24  0446 11/16/24  1815   MAGNESIUM mg/dL 2.0 1.9           Invalid input(s): \"LDLCALC\"  Results from last 7 days   Lab Units 11/21/24  0136 11/16/24  1238   PROBNP pg/mL 2,992.0* 2,608.0*     Results from last 7 days   Lab Units 11/16/24  1815   TSH uIU/mL 0.992       Recent Radiology:  Imaging Results (Most Recent)       Procedure Component Value Units Date/Time    FL Video Swallow With Speech Single Contrast [[id number]] Resulted: 11/20/24 1040     Updated: 11/20/24 1040    Narrative:      This procedure was auto-finalized with no dictation required.    XR Chest 1 View [[id number]] Collected: 11/19/24 0952     Updated: 11/19/24 0956    Narrative:      XR CHEST 1 VW    Date of Exam: 11/19/2024 9:46 AM EST    Indication: shortness of breath    Comparison: 11/16/2024 chest radiograph, 11/17/2024 chest CT    Findings:  Mild scattered patchy areas of airspace disease concerning for pneumonia. Underlying emphysema with " chronic interstitial lung disease similar to prior studies. No pneumothorax. Severe bilateral glenohumeral osteoarthritis. Heart size top normal,   exaggerated by technique. Aortic arch atherosclerosis. No pneumothorax or pleural effusion.      Impression:      Impression:  1. Mild scattered patchy areas of airspace disease concerning for pneumonia.  2. Underlying emphysema and chronic interstitial lung disease.        Electronically Signed: Landen Mock MD    11/19/2024 9:54 AM EST    Workstation ID: [id number]    CT Chest Without Contrast Diagnostic [[id number]] Collected: 11/17/24 2013     Updated: 11/17/24 2020    Narrative:      CT CHEST WO CONTRAST DIAGNOSTIC    Date of Exam: 11/17/2024 8:00 PM EST    Indication: pulm edema vs infiltrate.    Comparison: Chest CT dated 8/18/2021    Technique: Axial CT images were obtained of the chest without contrast administration.  Sagittal and coronal reconstructions were performed.  Automated exposure control and iterative reconstruction methods were used.      FINDINGS:    Thoracic inlet: Unremarkable.    Great vessels: Atherosclerotic plaque is seen within the thoracic aorta and proximal arch vessels.    Mediastinum/Anna: Scattered subcentimeter mediastinal lymph nodes are noted, nonspecific. The esophagus appears unremarkable.    Lung parenchyma: Motion artifact limits evaluation of the lungs. Paraseptal emphysematous changes noted within the bilateral upper lobes. Mild hypoventilatory changes within the bilateral lung bases. Mild bilateral lower lobe infiltrates cannot be   excluded.    Trachea and airways: The trachea and central airways appear unremarkable.    Pleural space: No significant pleural effusion or pneumothorax.    Heart and pericardium: Coronary artery calcifications are present. Otherwise, the heart and pericardium appear unremarkable.    Chest wall: No acute or suspicious osseous or soft tissue lesion is identified.    Upper abdomen: No acute abnormality  is identified within the visualized upper abdomen.      Impression:      1.Examination is limited due to motion artifact.  2.Mild bilateral lower lobe infiltrates cannot be excluded.  3.Paraseptal emphysematous changes within the bilateral upper lobes.  4.Additional findings as detailed above.        Electronically Signed: Dom Noel MD    11/17/2024 8:18 PM EST    Workstation ID: [id number]    XR Chest 1 View [[id number]] Collected: 11/16/24 1220     Updated: 11/16/24 1223    Narrative:      XR CHEST 1 VW    Date of Exam: 11/16/2024 12:07 PM EST    Indication: cough    Comparison: Chest x-ray dated 6/17/2020    Findings:  Cardiac silhouette is enlarged. Vague bilateral lung opacities may represent edema or mild infiltrates. No significant pleural effusion or pneumothorax. No acute osseous lesion is seen. There are senescent changes of the aorta and skeletal structures.      Impression:      Impression:  1.Findings compatible with mild CHF. Concomitant mild infiltrates cannot be excluded.      Electronically Signed: Dom Noel MD    11/16/2024 12:21 PM EST    Workstation ID: [id number]            ECHOCARDIOGRAM:    Results for orders placed during the hospital encounter of 11/16/24    Adult Transthoracic Echo Complete W/ Cont if Necessary Per Protocol    Interpretation Summary    Left ventricular systolic function is normal. Calculated left ventricular EF = 64.3%    Left ventricular wall thickness is consistent with mild to moderate concentric hypertrophy.    Left ventricular diastolic function is consistent with (grade I) impaired relaxation.    The left atrial cavity is mildly dilated.    There is mild calcification of the aortic valve.    Estimated right ventricular systolic pressure from tricuspid regurgitation is markedly elevated (>55 mmHg).    Transthoracic echocardiography with EF of 64%.  Mild MR and no effusion    Electronically signed by Faustino García MD, 11/17/24, 2:35 PM EST.        I  reviewed the patient's new clinical results.    EKG:      Assessment:       CHF (congestive heart failure)    1) acute on chronic diastolic heart failure  - CT chest w/o showed vascular congestion  -TSH WNL  - 2D echo 11/2024 shows an EF of 64% with grade 1 diastolic dysfunction and mild MR.    2) Acute respiratory failure / parainfluenza virus / PNA    3) HTN     4) UTI  Tmax is 97.8 pulse is 99 respirations are 235 blood pressure is 125/58 sats are 99%  proBNP is elevated 2900  Sodium is 137 potassium is 4.6 creatinine 0.7 hemoglobin is 12.2  Normal LV systolic function  Diastolic dysfunction  Current medications include amlodipine 5 mg p.o. once a day clonidine 0.1 mg p.o. twice a day patient is on hydrochlorothiazide 25 mg p.o. once a day patient is on Toprol-XL 12.5 mg p.o. once a day patient is on Lovenox for DVT prophylaxis  Recommendation based on patient course

## 2024-11-21 NOTE — THERAPY TREATMENT NOTE
Acute Care - Speech Language Pathology   Swallow Treatment Note  [facility]     Patient Name: Sathya Flores  : 1930  MRN: [id number]  Today's Date: 2024               Admit Date: 2024    Visit Dx:     ICD-10-CM ICD-9-CM   1. Acute congestive heart failure, unspecified heart failure type  I50.9 428.0   2. Elevated troponin  R79.89 790.6   3. Atrial fibrillation, unspecified type  I48.91 427.31     Patient Active Problem List   Diagnosis    CHF (congestive heart failure)     History reviewed. No pertinent past medical history.  History reviewed. No pertinent surgical history.        EDUCATION  The patient has been educated in the following areas:   Dysphagia (Swallowing Impairment).        SLP GOALS       Row Name 24 1300       (LTG) Swallow    (LTG) Swallow Pt will maximize swallow function for least restrictive PO diet, exhibiting no complication associated with dysphagia, adequate PO intake, and demonstrating independent use of swallow compensation.  -MS    [place] (Swallow Long Term Goal) with minimal cues (75-90% accuracy)  -MS    Time Frame (Swallow Long Term Goal) by discharge  -MS    Progress/Outcomes (Swallow Long Term Goal) continuing progress toward goal  -MS       (STG) Swallow 1    (STG) Swallow 1 Patient will participate in ongoing assessment of swallow, including reevaluation clinically and/or including instrumental assessment of swallow if indicated, to further assess swallow function and return to oral nutrition.  -MS    [place] (Swallow Short Term Goal 1) with minimal cues (75-90% accuracy)  -MS    Time Frame (Swallow Short Term Goal 1) by discharge  -MS    Progress/Outcomes (Swallow Short Term Goal 1) continuing progress toward goal  -MS       (STG) Swallow 2    (STG) Swallow 2 The patient will participate in a full meal assessment to determine safety and adequacy of recommended diet, independent use of safe swallow compensations, and additional goals/recommendations  to follow.  -MS    [place] (Swallow Short Term Goal 2) with minimal cues (75-90% accuracy)  -MS    Time Frame (Swallow Short Term Goal 2) by discharge  -MS    Progress/Outcomes (Swallow Short Term Goal 2) continuing progress toward goal  -MS    Comment (Swallow Short Term Goal 2) Pt was seen for DT/meal at lunch. Pt is currently on mechanical soft diet and NTL liquids; no straw. Pt has adequate natural dentition. Pt was alert & awake sitting upright at 90 degrees in chair. Pt family friend in room and feeding pt. Pt tolerated lunch which included meatloaf, mashed potatoes, carrots, and macaroni. SLP educated family friend on FWP 30 minutes after consumption of food and NTL.Family friend stated pt has been doing good during meals. No s/s noted during meal of penetration/aspiration. No residue or pocketing. Slightly prolonged mastication during macaroni.  Recommending pt continue with mechanical soft diet and NTL at this time; no straw. ST will continue to follow closely to assure safety and tolerance with recommended diet.     Swallowing precautions include:  *Pt sitting upright at 90 degrees hip flexion  *Oral care to be completed at least x2 daily  *Notify SLP if any s/s of penetration/aspiration occur  *[place] water protocol- 30 minutes after consumption of food and NTL               -MS              User Key  (r) = Recorded By, (t) = Taken By, (c) = Cosigned By      Initials Name Provider Type    Tonny Cisse SLP    Speech and Language Pathologist                          LUIS De La Torre  11/21/2024

## 2024-11-21 NOTE — PROGRESS NOTES
Daily Progress Note        CHF (congestive heart failure)    Assessment:    Acute hypoxic respiratory insufficiency    Elevated troponin and proBNP  Iron deficiency    UTI  >100,000 CFU/mL Klebsiella oxytoca       Viral panel positive for parainfluenza  MRSA DNA probe positive     Recommendations:     Oxygen titration currently on 2 L nasal cannula    Low-dose steroids IV Solu-Medrol 20 mg daily    antibiotic Unasyn    DVT prophylaxis Lovenox 40 mg subcu daily    Protonix 40 mg daily                     LOS: 5 days     Subjective         Objective     Vital signs for last 24 hours:  Vitals:    11/21/24 0645 11/21/24 0649 11/21/24 0739 11/21/24 0806   BP:   119/60 125/58   BP Location:   Right arm    Patient Position:   Lying    Pulse: 103 104 99 99   Resp: (!) 36 (!) 32 (!) 35    Temp:   97.8 °F (36.6 °C)    TempSrc:   Oral    SpO2: 96% 99%     Weight:       Height:           Intake/Output last 3 shifts:  I/O last 3 completed shifts:  In: 360 [P.O.:360]  Out: 1250 [Urine:1250]  Intake/Output this shift:  No intake/output data recorded.      Radiology  Imaging Results (Last 24 Hours)       Procedure Component Value Units Date/Time    FL Video Swallow With Speech Single Contrast [[id number]] Resulted: 11/20/24 1040     Updated: 11/20/24 1040    Narrative:      This procedure was auto-finalized with no dictation required.            Labs:  Results from last 7 days   Lab Units 11/21/24  0136   WBC 10*3/mm3 10.24   HEMOGLOBIN g/dL 12.0*   HEMATOCRIT % 38.2   PLATELETS 10*3/mm3 187     Results from last 7 days   Lab Units 11/21/24  0136 11/16/24  1815 11/16/24  1238   SODIUM mmol/L 137   < > 136   POTASSIUM mmol/L 4.6   < > 4.4   CHLORIDE mmol/L 101   < > 101   CO2 mmol/L 26.2   < > 28.4   BUN mg/dL 31*   < > 29*   CREATININE mg/dL 0.78   < > 0.94   CALCIUM mg/dL 9.0   < > 8.9   BILIRUBIN mg/dL  --   --  0.2   ALK PHOS U/L  --   --  116   ALT (SGPT) U/L  --   --  21   AST (SGOT) U/L  --   --  18   GLUCOSE mg/dL 111*   < >  108*    < > = values in this interval not displayed.         Results from last 7 days   Lab Units 11/16/24  1238   ALBUMIN g/dL 3.3*     Results from last 7 days   Lab Units 11/16/24  1409 11/16/24  1238   HSTROP T ng/L 54* 56*         Results from last 7 days   Lab Units 11/19/24  0446   MAGNESIUM mg/dL 2.0         Results from last 7 days   Lab Units 11/16/24  1815   TSH uIU/mL 0.992           Meds:   SCHEDULE  amLODIPine, 5 mg, Oral, Daily  ampicillin-sulbactam, 3 g, Intravenous, Q6H  brimonidine, 1 drop, Right Eye, Q12H   And  timolol, 1 drop, Right Eye, Q12H  cloNIDine, 0.1 mg, Oral, BID  enoxaparin, 40 mg, Subcutaneous, Q24H  guaiFENesin, 1,200 mg, Oral, Q12H  hydroCHLOROthiazide, 25 mg, Oral, Daily  ipratropium-albuterol, 3 mL, Nebulization, TID - RT  latanoprost, 1 drop, Right Eye, Nightly  methylPREDNISolone sodium succinate, 20 mg, Intravenous, Daily  metoprolol succinate XL, 12.5 mg, Oral, Q24H  pantoprazole, 40 mg, Oral, Q AM  primidone, 250 mg, Oral, TID  sodium chloride, 10 mL, Intravenous, Q12H      Infusions  Pharmacy to Dose enoxaparin (LOVENOX),       PRNs    acetaminophen **OR** acetaminophen **OR** acetaminophen    senna-docusate sodium **AND** polyethylene glycol **AND** bisacodyl **AND** bisacodyl    Calcium Replacement - Follow Nurse / BPA Driven Protocol    hydrALAZINE    Magnesium Standard Dose Replacement - Follow Nurse / BPA Driven Protocol    ondansetron ODT **OR** ondansetron    Pharmacy to Dose enoxaparin (LOVENOX)    Phosphorus Replacement - Follow Nurse / BPA Driven Protocol    Potassium Replacement - Follow Nurse / BPA Driven Protocol    [COMPLETED] Insert peripheral IV **AND** sodium chloride    sodium chloride    sodium chloride    Physical Exam:  Physical Exam  Cardiovascular:      Heart sounds: Murmur heard.      No gallop.   Pulmonary:      Effort: No respiratory distress.      Breath sounds: No stridor. Rhonchi and rales present. No wheezing.   Chest:      Chest wall: No  tenderness.         ROS  Review of Systems   Respiratory:  Positive for cough and shortness of breath. Negative for wheezing and stridor.    Cardiovascular:  Positive for palpitations. Negative for chest pain and leg swelling.             Total time spent with patient greater than: 45 Minutes

## 2024-11-21 NOTE — PLAN OF CARE
Goal Outcome Evaluation:         Patient continues treatment for UTI, ASP pna, CHF exacerbation.     Remains on Zosyn q12.     Swallow study revealed he has been aspiration. Diet upgraded to mech soft and nectar thick liquid with diamond water protocol     Lungs coarse this shift. 1x dose IV lasix administered, hctz restarted.

## 2024-11-21 NOTE — THERAPY TREATMENT NOTE
"Subjective: Pt agreeable to therapeutic plan of care.    Objective:     Precautions -  high fall risk    Bed mobility - Max-A  Transfers - Max-A and with rolling walker  Ambulation - N/A feet N/A or Not attempted.    Vitals: desaturates with activity   86% on 2.5L during transfer, educated on PLB, recovered once seated to mid 90s    Pain: 0 VAS   Location: N/A  Intervention for pain: N/A    Education: Provided education on the importance of mobility in the acute care setting, Verbal/Tactile Cues, Transfer Training, and Energy conservation strategies    Assessment: Sathya Flores presents with functional mobility impairments which indicate the need for skilled intervention. Mr. Flores is A&Ox4 today. He displays Parkinsonian symptoms including, pill rolling tremor in RUE, severe posterior lean, flat effect, bradykinesia, and shuffling steps. Pt is mod-maxA supine to sit EOB. Pt is maxA for STS transfer from EOB and from chair to complete pericare (dep). He is modA for taking steps to chair with RW, verbal and tactile cues needed. Pt is very soft spoken and difficult to understand at times but very pleasant. Will continue to follow and progress as tolerated.     Plan/Recommendations:   If medically appropriate, Moderate Intensity Therapy recommended post-acute care. This is recommended as therapy feels the patient would require 3-4 days per week and wouldn't tolerate \"3 hour daily\" rehab intensity. SNF would be the preferred choice. If the patient does not agree to SNF, arrange HH or OP depending on home bound status. If patient is medically complex, consider LTACH. Pt requires no DME at discharge.     Pt desires Skilled Rehab placement at discharge. Pt cooperative; agreeable to therapeutic recommendations and plan of care.     Basic Mobility 6-click:  Rollin = Total, A lot = 2, A little = 3; 4 = None  Supine>Sit:   1 = Total, A lot = 2, A little = 3; 4 = None   Sit>Stand with arms:  1 = Total, A lot = 2, A " little = 3; 4 = None  Bed>Chair:   1 = Total, A lot = 2, A little = 3; 4 = None  Ambulate in room:  1 = Total, A lot = 2, A little = 3; 4 = None  3-5 Steps with railin = Total, A lot = 2, A little = 3; 4 = None  Score: 9    Modified [place]: N/A = No pre-op stroke/TIA    Post-Tx Position: Up in Chair, Staff Present, Alarms activated, and Call light and personal items within reach  PPE: gloves, surgical mask, and gown    Therapy Charges for Today       Code Description Service Date Service Provider Modifiers Qty    16599739953  PT THERAPEUTIC ACT EA 15 MIN 2024 Amita Pugh, PT GP 2    [id number]  PT SELF CARE/MGMT/TRAIN EA 15 MIN 2024 Amita Pugh, PT GP 1           PT Charges       Row Name 24 1318             Time Calculation    Start Time 1002  -      Stop Time 1038  -      Time Calculation (min) 36 min  -      PT Received On 24  -      PT - Next Appointment 24  -         Time Calculation- PT    Total Timed Code Minutes- PT 36 minute(s)  -                User Key  (r) = Recorded By, (t) = Taken By, (c) = Cosigned By      Initials Name Provider Type    Amita Dent, PT Physical Therapist

## 2024-11-21 NOTE — PLAN OF CARE
Goal Outcome Evaluation:      Pt alert to self only. Adequate urine output, 1 BM this shift. Vital signs stable, pt has no complaints at this time.

## 2024-11-21 NOTE — PROGRESS NOTES
LOS: 5 days   Patient Care Team:  Provider, No Known as PCP - Rogelio Khan MD as PCP - Family Medicine    Subjective     Patient is up to the chair and wanting to go home    Review of Systems   Constitutional:  Positive for activity change, appetite change and fatigue.   HENT: Negative.     Respiratory:  Positive for shortness of breath.    Cardiovascular: Negative.    Gastrointestinal: Negative.    Genitourinary: Negative.    Musculoskeletal:  Positive for gait problem.   Neurological:  Positive for weakness.   Psychiatric/Behavioral: Negative.             Objective     Vital Signs  Temp:  [97.5 °F (36.4 °C)-100.3 °F (37.9 °C)] 97.8 °F (36.6 °C)  Heart Rate:  [] 99  Resp:  [20-39] 35  BP: ()/(47-82) 125/58      Physical Exam  Vitals reviewed.   Constitutional:       Appearance: He is not ill-appearing.   HENT:      Head: Normocephalic and atraumatic.      Right Ear: External ear normal.      Left Ear: External ear normal.      Nose: Nose normal.      Mouth/Throat:      Mouth: Mucous membranes are dry.   Eyes:      General:         Right eye: No discharge.         Left eye: No discharge.   Cardiovascular:      Rate and Rhythm: Normal rate and regular rhythm.      Pulses: Normal pulses.      Heart sounds: Normal heart sounds.   Pulmonary:      Effort: Pulmonary effort is normal.      Breath sounds: Normal breath sounds.   Abdominal:      General: Bowel sounds are normal.      Palpations: Abdomen is soft.   Musculoskeletal:         General: Normal range of motion.      Cervical back: Normal range of motion.   Skin:     General: Skin is warm and dry.   Neurological:      Mental Status: He is alert and oriented to person, place, and time.   Psychiatric:         Behavior: Behavior normal.              Results Review:    Lab Results (last 24 hours)       Procedure Component Value Units Date/Time    Basic Metabolic Panel [[id number]]  (Abnormal) Collected: 11/21/24 0136    Specimen: Blood  Updated: 11/21/24 0229     Glucose 111 mg/dL      BUN 31 mg/dL      Creatinine 0.78 mg/dL      Sodium 137 mmol/L      Potassium 4.6 mmol/L      Comment: Specimen hemolyzed.  Result may be falsely elevated.        Chloride 101 mmol/L      CO2 26.2 mmol/L      Calcium 9.0 mg/dL      BUN/Creatinine Ratio 39.7     Anion Gap 9.8 mmol/L      eGFR 82.6 mL/min/1.73     Narrative:      GFR Normal >60  Chronic Kidney Disease <60  Kidney Failure <15    The GFR formula is only valid for adults with stable renal function between ages 18 and 70.    BNP [[id number]]  (Abnormal) Collected: 11/21/24 0136    Specimen: Blood Updated: 11/21/24 0227     proBNP 2,992.0 pg/mL     Narrative:      This assay is used as an aid in the diagnosis of individuals suspected of having heart failure. It can be used as an aid in the diagnosis of acute decompensated heart failure (ADHF) in patients presenting with signs and symptoms of ADHF to the emergency department (ED). In addition, NT-proBNP of <300 pg/mL indicates ADHF is not likely.    Age Range Result Interpretation  NT-proBNP Concentration (pg/mL:      <50             Positive            >450                   Gray                 300-450                    Negative             <300    50-75           Positive            >900                  Gray                300-900                  Negative            <300      >75             Positive            >1800                  Gray                300-1800                  Negative            <300    CBC & Differential [[id number]]  (Abnormal) Collected: 11/21/24 0136    Specimen: Blood Updated: 11/21/24 0202    Narrative:      The following orders were created for panel order CBC & Differential.  Procedure                               Abnormality         Status                     ---------                               -----------         ------                     CBC Auto Differential[[id number]]        Abnormal            Final result                  Please view results for these tests on the individual orders.    CBC Auto Differential [[id number]]  (Abnormal) Collected: 11/21/24 0136    Specimen: Blood Updated: 11/21/24 0202     WBC 10.24 10*3/mm3      RBC 3.79 10*6/mm3      Hemoglobin 12.0 g/dL      Hematocrit 38.2 %      .8 fL      MCH 31.7 pg      MCHC 31.4 g/dL      RDW 14.2 %      RDW-SD 52.9 fl      MPV 10.5 fL      Platelets 187 10*3/mm3      Neutrophil % 68.9 %      Lymphocyte % 15.6 %      Monocyte % 14.2 %      Eosinophil % 0.4 %      Basophil % 0.3 %      Immature Grans % 0.6 %      Neutrophils, Absolute 7.06 10*3/mm3      Lymphocytes, Absolute 1.60 10*3/mm3      Monocytes, Absolute 1.45 10*3/mm3      Eosinophils, Absolute 0.04 10*3/mm3      Basophils, Absolute 0.03 10*3/mm3      Immature Grans, Absolute 0.06 10*3/mm3      nRBC 0.0 /100 WBC              Imaging Results (Last 24 Hours)       ** No results found for the last 24 hours. **                 I reviewed the patient's new clinical results.    Medication Review:   Scheduled Meds:amLODIPine, 5 mg, Oral, Daily  ampicillin-sulbactam, 3 g, Intravenous, Q6H  brimonidine, 1 drop, Right Eye, Q12H   And  timolol, 1 drop, Right Eye, Q12H  cloNIDine, 0.1 mg, Oral, BID  enoxaparin, 40 mg, Subcutaneous, Q24H  guaiFENesin, 1,200 mg, Oral, Q12H  hydroCHLOROthiazide, 25 mg, Oral, Daily  ipratropium-albuterol, 3 mL, Nebulization, TID - RT  latanoprost, 1 drop, Right Eye, Nightly  methylPREDNISolone sodium succinate, 20 mg, Intravenous, Daily  metoprolol succinate XL, 12.5 mg, Oral, Q24H  pantoprazole, 40 mg, Oral, Q AM  primidone, 250 mg, Oral, TID  sodium chloride, 10 mL, Intravenous, Q12H      Continuous Infusions:Pharmacy to Dose enoxaparin (LOVENOX),       PRN Meds:.  acetaminophen **OR** acetaminophen **OR** acetaminophen    senna-docusate sodium **AND** polyethylene glycol **AND** bisacodyl **AND** bisacodyl    Calcium Replacement - Follow Nurse / BPA Driven Protocol    hydrALAZINE     Magnesium Standard Dose Replacement - Follow Nurse / BPA Driven Protocol    ondansetron ODT **OR** ondansetron    Pharmacy to Dose enoxaparin (LOVENOX)    Phosphorus Replacement - Follow Nurse / BPA Driven Protocol    Potassium Replacement - Follow Nurse / BPA Driven Protocol    [COMPLETED] Insert peripheral IV **AND** sodium chloride    sodium chloride    sodium chloride     Interval History:    11/21 patient has improved and overall doing well, hemodynamically stable with rehab bed available tomorrow    Assessment & Plan     Dyspnea dyspnea  Pulmonary edema  Fluid overload  Aspiration pna  Parainfluenza virus  MRSA positive  Elevated troponin  Elevated D-dimer  -Cardiology pulmonary following  -CT chest with vascular congestion  -diuresis      Glaucoma  -home drops     UTI  -rocephin  -culture klebsiella oxytoca     HTN  Amlodipine/clonidine/metoprolol     Essential tremors  -mysoline     DVT prophylaxis:lovenox  GI prophylaxis:protonix  Code status:DNR        Plan for disposition:Inpt rehab    Candelaria Spears, APRN  11/21/24  11:11 EST

## 2024-11-21 NOTE — PLAN OF CARE
Goal Outcome Evaluation:   Pt was seen for DT/meal at lunch. Pt is currently on mechanical soft diet and NTL liquids; no straw. Pt has adequate natural dentition. Pt was alert & awake sitting upright at 90 degrees in chair. Pt family friend in room and feeding pt. Pt tolerated lunch which included meatloaf, mashed potatoes, carrots, and macaroni. SLP educated family friend on FWP 30 minutes after consumption of food and NTL.Family friend stated pt has been doing good during meals. No s/s noted during meal of penetration/aspiration. No residue or pocketing. Slightly prolonged mastication during macaroni.  Recommending pt continue with mechanical soft diet and NTL at this time; no straw. ST will continue to follow closely to assure safety and tolerance with recommended diet.     Swallowing precautions include:  *Pt sitting upright at 90 degrees hip flexion  *Oral care to be completed at least x2 daily  *Notify SLP if any s/s of penetration/aspiration occur  *[place] water protocol- 30 minutes after consumption of food and NTL

## 2024-11-21 NOTE — CONSULTS
2  Primary Care Provider: Provider, No Known     Consult requested by: BEBO Pedersen    Reason for Consultation: Neurological evaluation /tremors, shuffling gait, rule out parkinsonism    History taken from: chart RN    Chief complaint: He was admitted for shortness of air       SUBJECTIVE:    History of present illness: Background per H&P: Sathya Flores is a 89 y.o. male who was evaluated in room 2210 at [facility]    Source of information is mostly the medical records    He was admitted on the 17th for shortness of air and fatigue.  He was diagnosed with parainfluenza and other conditions and congestive heart failure    He is a frail 89-year-old gentleman and he has history of tremors  Neurology consultation was obtained because of concern for parkinsonism    He is so frail and difficult to evaluate    He is on primidone but he cannot tell me who is giving him his primidone and what kind of movement disorder he has    He has generalized weakness    When distracted some pill-rolling type tremor was seen    The question is whether to treat such patients or not because they are very difficult to titrate and since we are hospital-based physicians we do not do outpatient follow-ups and sometimes even their medications are not followed and if there are side effects there is no way to contact us or for us to make changes    He is still weak and has so many medical issues, does he have any cognitive problems    I do not have much of this information    There is not even a head CT done    There is nothing suggesting seizure or passing out or large strokes though or CNS infection        As per admitting,  Patient is a 89 y.o. male who presents with past medical history of hypertension, tremors, and glaucoma. He presented from free standing ER due to edema and shortness of breath. Per note from ER he does not lay flat and stay in a recliner. Spoke with son in law and he states patient lives alone and this is  his first hospitalization. He states that family check in with him three times daily and more. He has been eating and drinking ok, has had some urinary urgency, and he agrees he is more confused but that is not his norm. Patient at examination is [place] and mildly confused. IN the ED, D-dimer elevated, cxr with pulmonary edema, leukocytosis,elevated troponin, and UTI with parainfluenzal virus and MRSA +.        As per cardiology,  Elderly patient with cough with mild fluid overload and elevated BNP  Medical management only  EKG shows sinus rhythm with PACs  Echo reviewed with normal EF with mild MR  Treat with gentle diuresis  Continue antihypertensive medications  D-dimer elevated and CT angio pending  Positive for parainfluenza virus         As per pulmonary,  89-year-old male presented on 11/17/2024 from urgent care with increasing shortness of breath, chest x-ray on 11/16/2024 showed bilateral alveolar infiltrates suggestive of pulmonary edema        Assessment:  Acute hypoxic respiratory insufficiency  Chest x-ray suggestive of pulmonary edema  Elevated troponin and proBNP  Iron deficiency  Elevated D-dimer  Abnormal UA  Viral panel positive for parainfluenza  MRSA DNA probe positive     Recommendations:     CT PE protocol  Oxygen titration  Low-dose steroids IV Solu-Medrol 20 mg daily  Empiric antibiotic Rocephin  Check urine cultures  DVT prophylaxis Lovenox 40 mg subcu daily  Protonix 40 mg daily      - Portions of the above HPI were copied from previous encounters and edited as appropriate. PMH as detailed below.     Review of Systems   Really not possible because of his present condition, he is very weak and lethargic    PATIENT HISTORY:  History reviewed. No pertinent past medical history., History reviewed. No pertinent surgical history., History reviewed. No pertinent family history.,   Social History     Tobacco Use    Smoking status: Former     Types: Cigarettes   Vaping Use    Vaping status: Never Used    Substance Use Topics    Alcohol use: Not Currently    Drug use: Never   ,   Prior to Admission medications    Medication Sig Start Date End Date Taking? Authorizing Provider   brimonidine-timolol (COMBIGAN) 0.2-0.5 % ophthalmic solution Administer 1 drop to the right eye Every 12 (Twelve) Hours.   Yes Shayy Maloney MD   latanoprost (XALATAN) 0.005 % ophthalmic solution Administer 1 drop to the right eye Every Night.   Yes Shayy Maloney MD   acetaminophen (TYLENOL) 325 MG tablet Take 2 tablets by mouth Every 4 (Four) Hours As Needed for Mild Pain.    Shayy Maloney MD   amLODIPine (NORVASC) 5 MG tablet Take 1 tablet by mouth Daily.    Shayy Maloney MD   cloNIDine (CATAPRES) 0.1 MG tablet Take 1 tablet by mouth 2 (Two) Times a Day.    Shayy Maloney MD   lisinopril-hydrochlorothiazide (PRINZIDE,ZESTORETIC) 20-12.5 MG per tablet Take 1 tablet by mouth Daily.    Shayy Maloney MD   PENTOXIFYLLINE ER PO Take 400 mg by mouth 3 (Three) Times a Day.    Shayy Maloney MD   primidone (MYSOLINE) 250 MG tablet Take 1 tablet by mouth 3 (Three) Times a Day.    Shayy Maloney MD   vitamin B-6 (PYRIDOXINE) 50 MG tablet Take 0.5 tablets by mouth Daily.    Shayy Maloney MD    Allergies:  Patient has no known allergies.    Current Facility-Administered Medications   Medication Dose Route Frequency Provider Last Rate Last Admin    acetaminophen (TYLENOL) tablet 650 mg  650 mg Oral Q4H PRN Nasreen De La Cruz MD   650 mg at 11/18/24 1042    Or    acetaminophen (TYLENOL) 160 MG/5ML oral solution 650 mg  650 mg Oral Q4H PRN Nasreen De La Cruz MD   650 mg at 11/19/24 0556    Or    acetaminophen (TYLENOL) suppository 650 mg  650 mg Rectal Q4H PRN Nasreen De La Cruz MD        amLODIPine (NORVASC) tablet 5 mg  5 mg Oral Daily Filomena Melgar APRN   5 mg at 11/21/24 0806    ampicillin-sulbactam (UNASYN) 3 g in sodium chloride 0.9 % 100 mL MBP  3 g Intravenous Q6H Chacha Mccarty,  MD   3 g at 11/21/24 0757    sennosides-docusate (PERICOLACE) 8.6-50 MG per tablet 2 tablet  2 tablet Oral BID PRN Nasreen De La Cruz MD        And    polyethylene glycol (MIRALAX) packet 17 g  17 g Oral Daily PRN Nasreen De La Cruz MD        And    bisacodyl (DULCOLAX) EC tablet 5 mg  5 mg Oral Daily PRN Nasreen De La Cruz MD        And    bisacodyl (DULCOLAX) suppository 10 mg  10 mg Rectal Daily PRN Nasreen De La Cruz MD        brimonidine (ALPHAGAN) 0.2 % ophthalmic solution 1 drop  1 drop Right Eye Q12H Chacha Mccarty MD   1 drop at 11/21/24 0807    And    timolol (TIMOPTIC) 0.5 % ophthalmic solution 1 drop  1 drop Right Eye Q12H Chacha Mccarty MD   1 drop at 11/21/24 0807    Calcium Replacement - Follow Nurse / BPA Driven Protocol   Not Applicable PRNasreen Vilchis MD        cloNIDine (CATAPRES) tablet 0.1 mg  0.1 mg Oral BID Candelaria Spears APRN   0.1 mg at 11/21/24 0807    Enoxaparin Sodium (LOVENOX) syringe 40 mg  40 mg Subcutaneous Q24H Nasreen De La Cruz MD   40 mg at 11/20/24 1904    guaiFENesin (MUCINEX) 12 hr tablet 1,200 mg  1,200 mg Oral Q12H Chacha Mccarty MD   1,200 mg at 11/21/24 0806    hydrALAZINE (APRESOLINE) injection 10 mg  10 mg Intravenous Q6H PRN Nasreen De La Cruz MD        hydroCHLOROthiazide tablet 25 mg  25 mg Oral Daily Chacha Mccarty MD   25 mg at 11/21/24 0807    ipratropium-albuterol (DUO-NEB) nebulizer solution 3 mL  3 mL Nebulization TID - RT Chacha Mccarty MD   3 mL at 11/21/24 0645    latanoprost (XALATAN) 0.005 % ophthalmic solution 1 drop  1 drop Right Eye Nightly Chacha Mccarty MD   1 drop at 11/20/24 2038    Magnesium Standard Dose Replacement - Follow Nurse / BPA Driven Protocol   Not Applicable Nasreen Cade MD        methylPREDNISolone sodium succinate (SOLU-Medrol) injection 20 mg  20 mg Intravenous Daily Gian Pérez MD   20 mg at 11/21/24 0758    metoprolol succinate XL (TOPROL-XL) 24 hr tablet 12.5 mg  12.5 mg Oral Q24H Nasreen De La Cruz MD   12.5 mg at 11/21/24 0806    ondansetron  ODT (ZOFRAN-ODT) disintegrating tablet 4 mg  4 mg Oral Q6H PRN Nasreen De La Cruz MD        Or    ondansetron (ZOFRAN) injection 4 mg  4 mg Intravenous Q6H PRN Nasreen De La Cruz MD        pantoprazole (PROTONIX) EC tablet 40 mg  40 mg Oral Q AM Chacha Mccarty MD   40 mg at 11/21/24 0806    Pharmacy to Dose enoxaparin (LOVENOX)   Not Applicable Continuous PRNasreen Vilchis MD        Phosphorus Replacement - Follow Nurse / BPA Driven Protocol   Not Applicable PRNasreen Vilchis MD        Potassium Replacement - Follow Nurse / BPA Driven Protocol   Not Applicable PRNasreen Vilchis MD        primidone (MYSOLINE) tablet 250 mg  250 mg Oral TID Candelaria Spears APRN   250 mg at 11/21/24 0806    sodium chloride 0.9 % flush 10 mL  10 mL Intravenous PRN Sathya Weinstein MD        sodium chloride 0.9 % flush 10 mL  10 mL Intravenous Q12H Nasreen De La Cruz MD   10 mL at 11/21/24 0757    sodium chloride 0.9 % flush 10 mL  10 mL Intravenous PRN Nasreen De La Cruz MD        sodium chloride 0.9 % infusion 40 mL  40 mL Intravenous PRN Nasreen De La Cruz MD            ________________________________________________________        OBJECTIVE:  Upon today's exam, patient was sitting in bedside chair in no acute distress but very weak      The patient is awake lethargic and oriented to himself only  He can name and he can follow commands and repeat  Cranial nerve examination demonstrate:  Full fields of vision to confrontation  Pupils are round, reactive to light and accommodation and size of about 3 mm  No ptosis or nystagmus  Funduscopic examination was not successful  Eye movements are conjugate     Sensation on the face and scalp are normal  Muscles of mastication are normal and symmetric  Muscles of  facial expression are normal and symmetric  Hearing is intact bilaterally  Head turning and shoulder shrugs were unremarkable  Tongue was midline  I could not visualize  oropharynx or uvula     Motor examination:  Increased tone and  strength was mostly 4 -/5  No fasciculations     Sensory examination:  Intact for soft touch, pain   Romberg was not evaluated     Reflexes:  0/4     Coordination:  He had some pill-rolling type tremor and he really struggled with any kind of repetitive movements or finger-to-nose to finger     Gait:  Deferred     Toe signs:  [place]            ________________________________________________________   RESULTS REVIEW:    VITAL SIGNS:   Temp:  [97.8 °F (36.6 °C)-100.3 °F (37.9 °C)] 98.3 °F (36.8 °C)  Heart Rate:  [] 80  Resp:  [20-39] 33  BP: ()/(47-82) 99/56     LABS:      Lab 11/21/24 0136 11/20/24 0305 11/19/24 0446 11/18/24 0456 11/17/24 0622 11/16/24  2204   WBC 10.24 10.06 13.12* 15.43* 12.50*  --    HEMOGLOBIN 12.0* 11.3* 11.4* 12.1* 13.2  --    HEMATOCRIT 38.2 35.5* 34.8* 38.7 40.3  --    PLATELETS 187 160 149 163 195  --    NEUTROS ABS 7.06* 6.63 8.88* 9.12* 8.64*  --    IMMATURE GRANS (ABS) 0.06* 0.05 0.06* 0.07* 0.04  --    LYMPHS ABS 1.60 1.88 1.90 3.28* 1.93  --    MONOS ABS 1.45* 1.46* 2.24* 2.92* 1.84*  --    EOS ABS 0.04 0.03 0.02 0.01 0.02  --    .8* 100.3* 97.5* 99.7* 97.3*  --    D DIMER QUANT  --   --   --   --   --  2.86*         Lab 11/21/24 0136 11/20/24 0305 11/19/24 0446 11/18/24 0456 11/17/24 0622 11/16/24  1815   SODIUM 137 137 137 138 139 138   POTASSIUM 4.6 4.6 4.4 4.2 4.3 4.4   CHLORIDE 101 101 101 100 99 102   CO2 26.2 28.7 26.4 29.3* 29.0 25.5   ANION GAP 9.8 7.3 9.6 8.7 11.0 10.5   BUN 31* 28* 32* 29* 23 27*   CREATININE 0.78 0.85 0.88 1.08 0.87 0.81   EGFR 82.6 80.5 79.7 63.6 80.0 81.7   GLUCOSE 111* 91 104* 105* 100* 127*   CALCIUM 9.0 8.9 8.6 8.9 9.4 9.0   MAGNESIUM  --   --  2.0  --   --  1.9   TSH  --   --   --   --   --  0.992         Lab 11/16/24  1238   TOTAL PROTEIN 7.0   ALBUMIN 3.3*   GLOBULIN 3.7   ALT (SGPT) 21   AST (SGOT) 18   BILIRUBIN 0.2   ALK PHOS 116         Lab 11/21/24  0136 11/16/24  1409 11/16/24  1238   PROBNP 2,992.0*  --  2,608.0*    HSTROP T  --  54* 56*             Lab 11/16/24  1815   IRON 23*   IRON SATURATION (TSAT) 10*   TIBC 240*   TRANSFERRIN 161*         UA          11/16/2024    17:15 11/17/2024    12:13   Urinalysis   Squamous Epithelial Cells, UA None Seen  0-2    Specific Gravity, UA 1.017  1.018    Ketones, UA Negative  Trace    Blood, UA Small (1+)  Moderate (2+)    Leukocytes, UA Large (3+)  Moderate (2+)    Nitrite, UA Positive  Positive    RBC, UA 0-2  6-10    WBC, UA Too Numerous to Count  Too Numerous to Count    Bacteria, UA 4+  4+        Lab Results   Component Value Date    TSH 0.992 11/16/2024       IMAGING STUDIES:  No radiology results for the last day    I reviewed the patient's new clinical results.    ________________________________________________________     PROBLEM LIST:    CHF (congestive heart failure)            ASSESSMENT/PLAN:  Generalized weakness  Rigidity  Tremor, some pill-rolling characteristics    It is very difficult to evaluate and treat patients like his condition with multiple medical issues going on    I am reluctant but I may try some Sinemet and see how he does and also get a head CT and labs      I discussed the patient's findings and my recommendations with patient and nursing staff    Eleanor Sampson MD  11/21/24  14:05 EST

## 2024-11-22 VITALS
HEIGHT: 66 IN | BODY MASS INDEX: 29.76 KG/M2 | SYSTOLIC BLOOD PRESSURE: 129 MMHG | TEMPERATURE: 98.3 F | RESPIRATION RATE: 34 BRPM | DIASTOLIC BLOOD PRESSURE: 71 MMHG | OXYGEN SATURATION: 93 % | WEIGHT: 185.19 LBS | HEART RATE: 82 BPM

## 2024-11-22 PROBLEM — I50.31 ACUTE HEART FAILURE WITH PRESERVED EJECTION FRACTION (HFPEF): Status: ACTIVE | Noted: 2024-11-22

## 2024-11-22 PROCEDURE — 94761 N-INVAS EAR/PLS OXIMETRY MLT: CPT

## 2024-11-22 PROCEDURE — 25010000002 AMPICILLIN-SULBACTAM PER 1.5 G: Performed by: INTERNAL MEDICINE

## 2024-11-22 PROCEDURE — 63710000001 PREDNISONE PER 5 MG: Performed by: NURSE PRACTITIONER

## 2024-11-22 PROCEDURE — 94799 UNLISTED PULMONARY SVC/PX: CPT

## 2024-11-22 PROCEDURE — 94664 DEMO&/EVAL PT USE INHALER: CPT

## 2024-11-22 RX ORDER — CEFDINIR 300 MG/1
300 CAPSULE ORAL 2 TIMES DAILY
Qty: 10 CAPSULE | Refills: 0 | Status: SHIPPED | OUTPATIENT
Start: 2024-11-22 | End: 2024-11-22

## 2024-11-22 RX ORDER — GUAIFENESIN 600 MG/1
1200 TABLET, EXTENDED RELEASE ORAL EVERY 12 HOURS SCHEDULED
Qty: 20 TABLET | Refills: 0 | Status: SHIPPED | OUTPATIENT
Start: 2024-11-22

## 2024-11-22 RX ORDER — HYDROCHLOROTHIAZIDE 25 MG/1
25 TABLET ORAL DAILY
Qty: 30 TABLET | Refills: 1 | Status: SHIPPED | OUTPATIENT
Start: 2024-11-22

## 2024-11-22 RX ORDER — IPRATROPIUM BROMIDE AND ALBUTEROL SULFATE 2.5; .5 MG/3ML; MG/3ML
3 SOLUTION RESPIRATORY (INHALATION) 3 TIMES DAILY PRN
Qty: 360 ML | Refills: 12 | Status: SHIPPED | OUTPATIENT
Start: 2024-11-22

## 2024-11-22 RX ORDER — CARBIDOPA AND LEVODOPA 25; 100 MG/1; MG/1
1 TABLET ORAL 3 TIMES DAILY
Qty: 90 TABLET | Refills: 1 | Status: SHIPPED | OUTPATIENT
Start: 2024-11-22

## 2024-11-22 RX ORDER — PREDNISONE 10 MG/1
10 TABLET ORAL
Qty: 3 TABLET | Refills: 0 | Status: SHIPPED | OUTPATIENT
Start: 2024-11-22 | End: 2024-11-25

## 2024-11-22 RX ORDER — PREDNISONE 10 MG/1
10 TABLET ORAL
Status: DISCONTINUED | OUTPATIENT
Start: 2024-11-22 | End: 2024-11-22 | Stop reason: HOSPADM

## 2024-11-22 RX ORDER — SULFAMETHOXAZOLE AND TRIMETHOPRIM 800; 160 MG/1; MG/1
1 TABLET ORAL EVERY 12 HOURS SCHEDULED
Status: DISCONTINUED | OUTPATIENT
Start: 2024-11-22 | End: 2024-11-22

## 2024-11-22 RX ORDER — SULFAMETHOXAZOLE AND TRIMETHOPRIM 800; 160 MG/1; MG/1
1 TABLET ORAL EVERY 12 HOURS SCHEDULED
Qty: 4 TABLET | Refills: 0 | Status: SHIPPED | OUTPATIENT
Start: 2024-11-22 | End: 2024-11-22

## 2024-11-22 RX ORDER — METOPROLOL SUCCINATE 25 MG/1
12.5 TABLET, EXTENDED RELEASE ORAL
Qty: 30 TABLET | Refills: 1 | Status: SHIPPED | OUTPATIENT
Start: 2024-11-22

## 2024-11-22 RX ORDER — PANTOPRAZOLE SODIUM 40 MG/1
40 TABLET, DELAYED RELEASE ORAL
Qty: 30 TABLET | Refills: 1 | Status: SHIPPED | OUTPATIENT
Start: 2024-11-23

## 2024-11-22 RX ADMIN — AMPICILLIN SODIUM AND SULBACTAM SODIUM 3 G: 2; 1 INJECTION, POWDER, FOR SOLUTION INTRAMUSCULAR; INTRAVENOUS at 06:36

## 2024-11-22 RX ADMIN — METOPROLOL SUCCINATE 12.5 MG: 25 TABLET, EXTENDED RELEASE ORAL at 09:10

## 2024-11-22 RX ADMIN — AMLODIPINE BESYLATE 5 MG: 5 TABLET ORAL at 09:10

## 2024-11-22 RX ADMIN — HYDROCHLOROTHIAZIDE 25 MG: 12.5 TABLET ORAL at 09:10

## 2024-11-22 RX ADMIN — IPRATROPIUM BROMIDE AND ALBUTEROL SULFATE 3 ML: .5; 3 SOLUTION RESPIRATORY (INHALATION) at 08:15

## 2024-11-22 RX ADMIN — PRIMIDONE 250 MG: 250 TABLET ORAL at 09:10

## 2024-11-22 RX ADMIN — BRIMONIDINE TARTRATE 1 DROP: 2 SOLUTION/ DROPS OPHTHALMIC at 09:11

## 2024-11-22 RX ADMIN — TIMOLOL MALEATE 1 DROP: 5 SOLUTION/ DROPS OPHTHALMIC at 09:11

## 2024-11-22 RX ADMIN — Medication 10 ML: at 09:12

## 2024-11-22 RX ADMIN — AMPICILLIN SODIUM AND SULBACTAM SODIUM 3 G: 2; 1 INJECTION, POWDER, FOR SOLUTION INTRAMUSCULAR; INTRAVENOUS at 01:54

## 2024-11-22 RX ADMIN — GUAIFENESIN 1200 MG: 600 TABLET, EXTENDED RELEASE ORAL at 09:10

## 2024-11-22 RX ADMIN — CLONIDINE HYDROCHLORIDE 0.1 MG: 0.1 TABLET ORAL at 09:11

## 2024-11-22 RX ADMIN — PANTOPRAZOLE SODIUM 40 MG: 40 TABLET, DELAYED RELEASE ORAL at 06:36

## 2024-11-22 RX ADMIN — PREDNISONE 10 MG: 10 TABLET ORAL at 09:11

## 2024-11-22 RX ADMIN — AMOXICILLIN AND CLAVULANATE POTASSIUM 1 TABLET: 875; 125 TABLET, FILM COATED ORAL at 09:15

## 2024-11-22 NOTE — NURSING NOTE
"Patient questioned about CODE status - patient stated, \"I want to be a DNR.\" Patient was AOx4 upon statement - family at bedside confirmed that was what patient had in his Living Will paperwork and that they had spoken about this before.   "
89-year-old male presented to the hospital with edema and shortness of breath.  Patient consult received to assess patient's bilateral elbows.  Patient is frail in appearance.  Arms are propped up on pillows and has silicone border foam dressings in place.  Patient has 2 areas.  The right elbow area appears to be a small skin tear.  It is middermal, it is clean and pink.  Would continue with the silicone border foam dressing and try to leave in place weekly and keep the arms up on pillows the left elbow injury.  I suspect this is a skin tear or shearing.  It is not really over the bony prominence.  It is middermal are partial-thickness it is approximately 1 x 1 cm.  It is pink.  It is clean.  There is scant exudate noted.  Would recommend keeping the area covered with a silicone border foam dressing and continue with pressure injury prevention strategies  
Discharge order received from MD.  Report called to [facility].  IV discontinued without complication.  Patient discharged to [facility] via wheelchair accompanied by EMS staff.  
Report called to Janett at [facility].  
murmur loudness: III/VI/heard best @ [facility]

## 2024-11-22 NOTE — CASE MANAGEMENT/SOCIAL WORK
Continued Stay Note  AJ Batistayd     Patient Name: Sathya Flores  MRN: [id number]  Today's Date: 11/22/2024    Admit Date: 11/16/2024    Plan: [facility] skilled (accepted), PASRR approved, Precert approved Valid 11/22-11/26   Discharge Plan       Row Name 11/22/24 1354       Plan    Patient/Family in Agreement with Plan yes    Plan Comments CM met with patient and family at bedside delivered copy IMM 11/22. Pt is on 2L NC, no home O2. Family does not have O2 for pt and facility is unable to transport. CM arranged w/c van for transport, Nurse notified. Pt has d/c orders.               Expected Discharge Date and Time       Expected Discharge Date Expected Discharge Time    Nov 22, 2024         Jena Linton RN    phone [phone]  fax [phone]

## 2024-11-22 NOTE — SIGNIFICANT NOTE
11/22/24 1129   OTHER   Discipline physical therapist   Rehab Time/Intention   Session Not Performed other (see comments)  ([facility] dc pending)

## 2024-11-22 NOTE — CASE MANAGEMENT/SOCIAL WORK
Case Management Discharge Note      Final Note: [place] [facility]    Provided Post Acute Provider List?: Yes  Post Acute Provider List: Nursing Home  Provided Post Acute Provider Quality & Resource List?: N/A  Delivered To: Support Person  Support Person: Zulay Wells  Method of Delivery: Telephone (list left in room with dtkingsley Aguirre)    Selected Continued Care - Discharged on 11/22/2024 Admission date: 11/16/2024 - Discharge disposition: Skilled Nursing Facility (DC - External)     Transportation Services  W/C Arnaldo: Doris Mcintosh    Final Discharge Disposition Code: 03 - skilled nursing facility (SNF)

## 2024-11-22 NOTE — PROGRESS NOTES
Patient stable  Head CT okay    Today his daughter was present at bedside and she is the POA    They have an established neurologist and he wanted to treat the patient for Parkinson's disease but there was concern about side effects and she refused    So she did not have him take Sinemet    There is not much for me to do otherwise, he can follow-up with his established neurologist and make whatever plans they agree upon

## 2024-11-22 NOTE — PROGRESS NOTES
Daily Progress Note        CHF (congestive heart failure)    Acute heart failure with preserved ejection fraction (HFpEF)    Assessment:    Acute hypoxic respiratory insufficiency    Elevated troponin and proBNP  Iron deficiency    UTI  >100,000 CFU/mL Klebsiella oxytoca       Viral panel positive for parainfluenza  MRSA DNA probe positive     Recommendations:     Oxygen titration currently on 2 L nasal cannula    Low-dose steroids IV Solu-Medrol 20 mg daily    antibiotic Unasyn    DVT prophylaxis Lovenox 40 mg subcu daily    Protonix 40 mg daily                     LOS: 6 days     Subjective         Objective     Vital signs for last 24 hours:  Vitals:    11/22/24 0428 11/22/24 0750 11/22/24 0815 11/22/24 0819   BP: 111/50 150/69     BP Location: Right arm Right arm     Patient Position: Lying Lying     Pulse: 82 98 94 100   Resp: 19 (!) 30 20 22   Temp: 98.3 °F (36.8 °C) 98.1 °F (36.7 °C)     TempSrc: Oral Oral     SpO2: 98% 94% 95% 97%   Weight:       Height:           Intake/Output last 3 shifts:  I/O last 3 completed shifts:  In: 1080 [P.O.:1080]  Out: 2800 [Urine:2800]  Intake/Output this shift:  No intake/output data recorded.      Radiology  Imaging Results (Last 24 Hours)       Procedure Component Value Units Date/Time    CT Head Without Contrast [[id number]] Collected: 11/21/24 1540     Updated: 11/21/24 1545    Narrative:      CT HEAD WO CONTRAST    Date of Exam: 11/21/2024 3:10 PM EST    Indication: Mental status changes.    Comparison: Head CT 4/12/2010    Technique: Axial CT images were obtained of the head without contrast administration.  Coronal reconstructions were performed.  Automated exposure control and iterative reconstruction methods were used.      Findings:  Negative for large territory loss of gray-white differentiation, acute intracranial hemorrhage, large mass lesion, midline shift or hydrocephalus. Mild to moderate global parenchymal volume loss slightly progressed from 2010. Patchy  periventricular   hypodensity suggesting chronic microvascular ischemic change progressed from 2010. No large extra-axial fluid collection. Distal vertebral artery and carotid atherosclerotic disease. Surgical changes of the lens. No obstructive sinus disease or layering   fluid. Small to moderate right and small left mastoid effusions noted. Negative for depressed skull fracture or aggressive lesion.      Impression:      Impression:  1. No acute intracranial findings by CT.  2. Global parenchymal volume loss and sequelae of chronic microvascular ischemic change, progressed since 2010 comparison.      Electronically Signed: Wilder Graham MD    11/21/2024 3:42 PM EST    Workstation ID: [id number]            Labs:  Results from last 7 days   Lab Units 11/21/24  0136   WBC 10*3/mm3 10.24   HEMOGLOBIN g/dL 12.0*   HEMATOCRIT % 38.2   PLATELETS 10*3/mm3 187     Results from last 7 days   Lab Units 11/21/24  0136 11/16/24  1815 11/16/24  1238   SODIUM mmol/L 137   < > 136   POTASSIUM mmol/L 4.6   < > 4.4   CHLORIDE mmol/L 101   < > 101   CO2 mmol/L 26.2   < > 28.4   BUN mg/dL 31*   < > 29*   CREATININE mg/dL 0.78   < > 0.94   CALCIUM mg/dL 9.0   < > 8.9   BILIRUBIN mg/dL  --   --  0.2   ALK PHOS U/L  --   --  116   ALT (SGPT) U/L  --   --  21   AST (SGOT) U/L  --   --  18   GLUCOSE mg/dL 111*   < > 108*    < > = values in this interval not displayed.         Results from last 7 days   Lab Units 11/16/24  1238   ALBUMIN g/dL 3.3*     Results from last 7 days   Lab Units 11/16/24  1409 11/16/24  1238   HSTROP T ng/L 54* 56*         Results from last 7 days   Lab Units 11/19/24  0446   MAGNESIUM mg/dL 2.0         Results from last 7 days   Lab Units 11/16/24  1815   TSH uIU/mL 0.992           Meds:   SCHEDULE  amLODIPine, 5 mg, Oral, Daily  amoxicillin-clavulanate, 1 tablet, Oral, Q12H  brimonidine, 1 drop, Right Eye, Q12H   And  timolol, 1 drop, Right Eye, Q12H  carbidopa-levodopa, 1 tablet, Oral, TID  cloNIDine, 0.1 mg,  Oral, BID  enoxaparin, 40 mg, Subcutaneous, Q24H  guaiFENesin, 1,200 mg, Oral, Q12H  hydroCHLOROthiazide, 25 mg, Oral, Daily  ipratropium-albuterol, 3 mL, Nebulization, TID - RT  latanoprost, 1 drop, Right Eye, Nightly  metoprolol succinate XL, 12.5 mg, Oral, Q24H  pantoprazole, 40 mg, Oral, Q AM  predniSONE, 10 mg, Oral, Daily With Breakfast  primidone, 250 mg, Oral, TID  sodium chloride, 10 mL, Intravenous, Q12H      Infusions  Pharmacy to Dose enoxaparin (LOVENOX),       PRNs    acetaminophen **OR** acetaminophen **OR** acetaminophen    senna-docusate sodium **AND** polyethylene glycol **AND** bisacodyl **AND** bisacodyl    Calcium Replacement - Follow Nurse / BPA Driven Protocol    hydrALAZINE    Magnesium Standard Dose Replacement - Follow Nurse / BPA Driven Protocol    ondansetron ODT **OR** ondansetron    Pharmacy to Dose enoxaparin (LOVENOX)    Phosphorus Replacement - Follow Nurse / BPA Driven Protocol    Potassium Replacement - Follow Nurse / BPA Driven Protocol    [COMPLETED] Insert peripheral IV **AND** sodium chloride    sodium chloride    sodium chloride    Physical Exam:  Physical Exam  Cardiovascular:      Heart sounds: Murmur heard.      No gallop.   Pulmonary:      Effort: No respiratory distress.      Breath sounds: No stridor. Rhonchi and rales present. No wheezing.   Chest:      Chest wall: No tenderness.         ROS  Review of Systems   Respiratory:  Positive for cough and shortness of breath. Negative for wheezing and stridor.    Cardiovascular:  Positive for palpitations. Negative for chest pain and leg swelling.             Total time spent with patient greater than: 45 Minutes

## 2024-11-22 NOTE — PLAN OF CARE
Goal Outcome Evaluation:    VSS, turned q2h, no c/o pain. Incontinent. No BM this shift. Continues on IV abx. Slept well most of shift. Tolerating thickened liquids. Plan for d/c in AM to [place]. Plan of care ongoing

## 2024-11-22 NOTE — PAYOR COMM NOTE
"Morgan Miranda (89 y.o. Male)  1930  [facility] Ref #[id number]   Clinical UD- Pt to DC 24. Plan to dc to SNF 24    AUTHORIZATION PENDING  PLEASE FORWARD DETERMINATION TO FOLLOWING CONTACT:    KASSANDRA CALZADA LPN UR  Utilization Review Nurse  [facility]  Direct & confidential phone # [phone]  Fax # [phone]        Date of Birth   1930    Social Security Number       Address   [address] [place] [address] [place] [address]    Home Phone   [phone]    MRN   [id number]       Caodaism   Unknown    Marital Status                               Admission Date   24    Admission Type   Urgent    Admitting Provider   Nasreen De La Cruz MD    Attending Provider   Chacha Mccarty MD    Department, Room/Bed   [facility] 2F,        Discharge Date       Discharge Disposition   Skilled Nursing Facility (DC - External)    Discharge Destination                                 Attending Provider: Chacha Mccarty MD    Allergies: No Known Allergies    Isolation: None   Infection: None   Code Status: No CPR    Ht: 167.6 cm (66\")   Wt: 84 kg (185 lb 3 oz)    Admission Cmt: None   Principal Problem: CHF (congestive heart failure) [I50.9]                   Active Insurance as of 2024       Primary Coverage       Payor Plan Insurance Group Employer/Plan Group    [facility] MEDICARE REPLACEMENT AARP MED ADV HMO [id number]       Payor Plan Address Payor Plan Phone Number Payor Plan Fax Number Effective Dates    [address]   2024 - None Entered    [place] [id number]         Subscriber Name Subscriber Birth Date Member ID       MORGAN MIRANDA 1930 [id number]                     Emergency Contacts        (Rel.) Home Phone Work Phone Mobile Phone    ALENADSNEENA (Daughter) [phone] -- --    PhoebeRogelio (Son) -- -- [phone]                 Physician Progress Notes (last 48 hours)        Candelaria Spears, APRN at 24 1111       " Attestation signed by Chacha Mccarty MD at 11/21/24 3187    I have reviewed this documentation and agree.                       LOS: 5 days   Patient Care Team:  Provider, No Known as PCP - Rogelio Khan MD as PCP - Family Medicine    Subjective     Patient is up to the chair and wanting to go home    Review of Systems   Constitutional:  Positive for activity change, appetite change and fatigue.   HENT: Negative.     Respiratory:  Positive for shortness of breath.    Cardiovascular: Negative.    Gastrointestinal: Negative.    Genitourinary: Negative.    Musculoskeletal:  Positive for gait problem.   Neurological:  Positive for weakness.   Psychiatric/Behavioral: Negative.             Objective     Vital Signs  Temp:  [97.5 °F (36.4 °C)-100.3 °F (37.9 °C)] 97.8 °F (36.6 °C)  Heart Rate:  [] 99  Resp:  [20-39] 35  BP: ()/(47-82) 125/58      Physical Exam  Vitals reviewed.   Constitutional:       Appearance: He is not ill-appearing.   HENT:      Head: Normocephalic and atraumatic.      Right Ear: External ear normal.      Left Ear: External ear normal.      Nose: Nose normal.      Mouth/Throat:      Mouth: Mucous membranes are dry.   Eyes:      General:         Right eye: No discharge.         Left eye: No discharge.   Cardiovascular:      Rate and Rhythm: Normal rate and regular rhythm.      Pulses: Normal pulses.      Heart sounds: Normal heart sounds.   Pulmonary:      Effort: Pulmonary effort is normal.      Breath sounds: Normal breath sounds.   Abdominal:      General: Bowel sounds are normal.      Palpations: Abdomen is soft.   Musculoskeletal:         General: Normal range of motion.      Cervical back: Normal range of motion.   Skin:     General: Skin is warm and dry.   Neurological:      Mental Status: He is alert and oriented to person, place, and time.   Psychiatric:         Behavior: Behavior normal.              Results Review:    Lab Results (last 24 hours)       Procedure Component  Value Units Date/Time    Basic Metabolic Panel [[id number]]  (Abnormal) Collected: 11/21/24 0136    Specimen: Blood Updated: 11/21/24 0229     Glucose 111 mg/dL      BUN 31 mg/dL      Creatinine 0.78 mg/dL      Sodium 137 mmol/L      Potassium 4.6 mmol/L      Comment: Specimen hemolyzed.  Result may be falsely elevated.        Chloride 101 mmol/L      CO2 26.2 mmol/L      Calcium 9.0 mg/dL      BUN/Creatinine Ratio 39.7     Anion Gap 9.8 mmol/L      eGFR 82.6 mL/min/1.73     Narrative:      GFR Normal >60  Chronic Kidney Disease <60  Kidney Failure <15    The GFR formula is only valid for adults with stable renal function between ages 18 and 70.    BNP [[id number]]  (Abnormal) Collected: 11/21/24 0136    Specimen: Blood Updated: 11/21/24 0227     proBNP 2,992.0 pg/mL     Narrative:      This assay is used as an aid in the diagnosis of individuals suspected of having heart failure. It can be used as an aid in the diagnosis of acute decompensated heart failure (ADHF) in patients presenting with signs and symptoms of ADHF to the emergency department (ED). In addition, NT-proBNP of <300 pg/mL indicates ADHF is not likely.    Age Range Result Interpretation  NT-proBNP Concentration (pg/mL:      <50             Positive            >450                   Gray                 300-450                    Negative             <300    50-75           Positive            >900                  Gray                300-900                  Negative            <300      >75             Positive            >1800                  Gray                300-1800                  Negative            <300    CBC & Differential [[id number]]  (Abnormal) Collected: 11/21/24 0136    Specimen: Blood Updated: 11/21/24 0202    Narrative:      The following orders were created for panel order CBC & Differential.  Procedure                               Abnormality         Status                     ---------                               -----------          ------                     CBC Auto Differential[[id number]]        Abnormal            Final result                 Please view results for these tests on the individual orders.    CBC Auto Differential [[id number]]  (Abnormal) Collected: 11/21/24 0136    Specimen: Blood Updated: 11/21/24 0202     WBC 10.24 10*3/mm3      RBC 3.79 10*6/mm3      Hemoglobin 12.0 g/dL      Hematocrit 38.2 %      .8 fL      MCH 31.7 pg      MCHC 31.4 g/dL      RDW 14.2 %      RDW-SD 52.9 fl      MPV 10.5 fL      Platelets 187 10*3/mm3      Neutrophil % 68.9 %      Lymphocyte % 15.6 %      Monocyte % 14.2 %      Eosinophil % 0.4 %      Basophil % 0.3 %      Immature Grans % 0.6 %      Neutrophils, Absolute 7.06 10*3/mm3      Lymphocytes, Absolute 1.60 10*3/mm3      Monocytes, Absolute 1.45 10*3/mm3      Eosinophils, Absolute 0.04 10*3/mm3      Basophils, Absolute 0.03 10*3/mm3      Immature Grans, Absolute 0.06 10*3/mm3      nRBC 0.0 /100 WBC              Imaging Results (Last 24 Hours)       ** No results found for the last 24 hours. **                 I reviewed the patient's new clinical results.    Medication Review:   Scheduled Meds:amLODIPine, 5 mg, Oral, Daily  ampicillin-sulbactam, 3 g, Intravenous, Q6H  brimonidine, 1 drop, Right Eye, Q12H   And  timolol, 1 drop, Right Eye, Q12H  cloNIDine, 0.1 mg, Oral, BID  enoxaparin, 40 mg, Subcutaneous, Q24H  guaiFENesin, 1,200 mg, Oral, Q12H  hydroCHLOROthiazide, 25 mg, Oral, Daily  ipratropium-albuterol, 3 mL, Nebulization, TID - RT  latanoprost, 1 drop, Right Eye, Nightly  methylPREDNISolone sodium succinate, 20 mg, Intravenous, Daily  metoprolol succinate XL, 12.5 mg, Oral, Q24H  pantoprazole, 40 mg, Oral, Q AM  primidone, 250 mg, Oral, TID  sodium chloride, 10 mL, Intravenous, Q12H      Continuous Infusions:Pharmacy to Dose enoxaparin (LOVENOX),       PRN Meds:.  acetaminophen **OR** acetaminophen **OR** acetaminophen    senna-docusate sodium **AND** polyethylene glycol  **AND** bisacodyl **AND** bisacodyl    Calcium Replacement - Follow Nurse / BPA Driven Protocol    hydrALAZINE    Magnesium Standard Dose Replacement - Follow Nurse / BPA Driven Protocol    ondansetron ODT **OR** ondansetron    Pharmacy to Dose enoxaparin (LOVENOX)    Phosphorus Replacement - Follow Nurse / BPA Driven Protocol    Potassium Replacement - Follow Nurse / BPA Driven Protocol    [COMPLETED] Insert peripheral IV **AND** sodium chloride    sodium chloride    sodium chloride     Interval History:     patient has improved and overall doing well, hemodynamically stable with rehab bed available tomorrow    Assessment & Plan     Dyspnea dyspnea  Pulmonary edema  Fluid overload  Aspiration pna  Parainfluenza virus  MRSA positive  Elevated troponin  Elevated D-dimer  -Cardiology pulmonary following  -CT chest with vascular congestion  -diuresis      Glaucoma  -home drops     UTI  -rocephin  -culture klebsiella oxytoca     HTN  Amlodipine/clonidine/metoprolol     Essential tremors  -mysoline     DVT prophylaxis:lovenox  GI prophylaxis:protonix  Code status:DNR        Plan for disposition:Inpt rehab    BEBO Doran  24  11:11 EST          Electronically signed by Chacha Mccarty MD at 24 1234       Derek Vanegas MD at 24 1005          [facility] CARDIOLOGY  [facility]        LOS:  LOS: 5 days   Patient Name: Sathya Flores  Age/Sex: 89 y.o. male  : 1930  MRN: [id number]    Day of Service: 24   Length of Stay: 5  Encounter Provider: Derek Vanegas MD  Place of Service: [facility] CARDIOLOGY  Patient Care Team:  Provider, No Known as PCP - Rogelio Khan MD as PCP - Family Medicine    Subjective:     Chief Complaint:  F/U HF    Subjective:   Patient reports breathing is fair.  Not coughing during exam today.    Current Medications:   Scheduled Meds:amLODIPine, 5 mg, Oral,  Daily  ampicillin-sulbactam, 3 g, Intravenous, Q6H  brimonidine, 1 drop, Right Eye, Q12H   And  timolol, 1 drop, Right Eye, Q12H  cloNIDine, 0.1 mg, Oral, BID  enoxaparin, 40 mg, Subcutaneous, Q24H  guaiFENesin, 1,200 mg, Oral, Q12H  hydroCHLOROthiazide, 25 mg, Oral, Daily  ipratropium-albuterol, 3 mL, Nebulization, TID - RT  latanoprost, 1 drop, Right Eye, Nightly  methylPREDNISolone sodium succinate, 20 mg, Intravenous, Daily  metoprolol succinate XL, 12.5 mg, Oral, Q24H  pantoprazole, 40 mg, Oral, Q AM  primidone, 250 mg, Oral, TID  sodium chloride, 10 mL, Intravenous, Q12H      Continuous Infusions:Pharmacy to Dose enoxaparin (LOVENOX),         Allergies:  No Known Allergies    Review of Systems   Unable to perform ROS: Mental status change       Objective:     Temp:  [97.5 °F (36.4 °C)-100.3 °F (37.9 °C)] 97.8 °F (36.6 °C)  Heart Rate:  [] 99  Resp:  [20-39] 35  BP: ()/(47-82) 125/58     Intake/Output Summary (Last 24 hours) at 11/21/2024 1005  Last data filed at 11/20/2024 2000  Gross per 24 hour   Intake 360 ml   Output 800 ml   Net -440 ml     Body mass index is 29.89 kg/m².      11/18/24  0405 11/20/24  0400 11/21/24  0600   Weight: 78.2 kg (172 lb 6.4 oz) 82.5 kg (181 lb 14.1 oz) 84 kg (185 lb 3 oz)         Physical Exam:  Neuro:  CV:  Resp:  GI:  Ext:  Tele: AAOx3, no gross deficits  S1S2 RRR, no murmur, +JVD  shallow, coarse  BS+, abd soft  Pedal pulses palp, trace BLE edema  SR with PACs                                                   Lab Review:   Results from last 7 days   Lab Units 11/21/24  0136 11/20/24  0305 11/16/24  1815 11/16/24  1238   SODIUM mmol/L 137 137   < > 136   POTASSIUM mmol/L 4.6 4.6   < > 4.4   CHLORIDE mmol/L 101 101   < > 101   CO2 mmol/L 26.2 28.7   < > 28.4   BUN mg/dL 31* 28*   < > 29*   CREATININE mg/dL 0.78 0.85   < > 0.94   GLUCOSE mg/dL 111* 91   < > 108*   CALCIUM mg/dL 9.0 8.9   < > 8.9   AST (SGOT) U/L  --   --   --  18   ALT (SGPT) U/L  --   --   --  21     "< > = values in this interval not displayed.     Results from last 7 days   Lab Units 11/16/24  1409 11/16/24  1238   HSTROP T ng/L 54* 56*     Results from last 7 days   Lab Units 11/21/24  0136 11/20/24  0305   WBC 10*3/mm3 10.24 10.06   HEMOGLOBIN g/dL 12.0* 11.3*   HEMATOCRIT % 38.2 35.5*   PLATELETS 10*3/mm3 187 160         Results from last 7 days   Lab Units 11/19/24  0446 11/16/24  1815   MAGNESIUM mg/dL 2.0 1.9           Invalid input(s): \"LDLCALC\"  Results from last 7 days   Lab Units 11/21/24  0136 11/16/24  1238   PROBNP pg/mL 2,992.0* 2,608.0*     Results from last 7 days   Lab Units 11/16/24  1815   TSH uIU/mL 0.992       Recent Radiology:  Imaging Results (Most Recent)       Procedure Component Value Units Date/Time    FL Video Swallow With Speech Single Contrast [[id number]] Resulted: 11/20/24 1040     Updated: 11/20/24 1040    Narrative:      This procedure was auto-finalized with no dictation required.    XR Chest 1 View [[id number]] Collected: 11/19/24 0952     Updated: 11/19/24 0956    Narrative:      XR CHEST 1 VW    Date of Exam: 11/19/2024 9:46 AM EST    Indication: shortness of breath    Comparison: 11/16/2024 chest radiograph, 11/17/2024 chest CT    Findings:  Mild scattered patchy areas of airspace disease concerning for pneumonia. Underlying emphysema with chronic interstitial lung disease similar to prior studies. No pneumothorax. Severe bilateral glenohumeral osteoarthritis. Heart size top normal,   exaggerated by technique. Aortic arch atherosclerosis. No pneumothorax or pleural effusion.      Impression:      Impression:  1. Mild scattered patchy areas of airspace disease concerning for pneumonia.  2. Underlying emphysema and chronic interstitial lung disease.        Electronically Signed: Landen Mock MD    11/19/2024 9:54 AM EST    Workstation ID: [id number]    CT Chest Without Contrast Diagnostic [[id number]] Collected: 11/17/24 2013     Updated: 11/17/24 2020    Narrative:      CT CHEST " WO CONTRAST DIAGNOSTIC    Date of Exam: 11/17/2024 8:00 PM EST    Indication: pulm edema vs infiltrate.    Comparison: Chest CT dated 8/18/2021    Technique: Axial CT images were obtained of the chest without contrast administration.  Sagittal and coronal reconstructions were performed.  Automated exposure control and iterative reconstruction methods were used.      FINDINGS:    Thoracic inlet: Unremarkable.    Great vessels: Atherosclerotic plaque is seen within the thoracic aorta and proximal arch vessels.    Mediastinum/Anna: Scattered subcentimeter mediastinal lymph nodes are noted, nonspecific. The esophagus appears unremarkable.    Lung parenchyma: Motion artifact limits evaluation of the lungs. Paraseptal emphysematous changes noted within the bilateral upper lobes. Mild hypoventilatory changes within the bilateral lung bases. Mild bilateral lower lobe infiltrates cannot be   excluded.    Trachea and airways: The trachea and central airways appear unremarkable.    Pleural space: No significant pleural effusion or pneumothorax.    Heart and pericardium: Coronary artery calcifications are present. Otherwise, the heart and pericardium appear unremarkable.    Chest wall: No acute or suspicious osseous or soft tissue lesion is identified.    Upper abdomen: No acute abnormality is identified within the visualized upper abdomen.      Impression:      1.Examination is limited due to motion artifact.  2.Mild bilateral lower lobe infiltrates cannot be excluded.  3.Paraseptal emphysematous changes within the bilateral upper lobes.  4.Additional findings as detailed above.        Electronically Signed: Dom Noel MD    11/17/2024 8:18 PM EST    Workstation ID: [id number]    XR Chest 1 View [[id number]] Collected: 11/16/24 1220     Updated: 11/16/24 1223    Narrative:      XR CHEST 1 VW    Date of Exam: 11/16/2024 12:07 PM EST    Indication: cough    Comparison: Chest x-ray dated 6/17/2020    Findings:  Cardiac  silhouette is enlarged. Vague bilateral lung opacities may represent edema or mild infiltrates. No significant pleural effusion or pneumothorax. No acute osseous lesion is seen. There are senescent changes of the aorta and skeletal structures.      Impression:      Impression:  1.Findings compatible with mild CHF. Concomitant mild infiltrates cannot be excluded.      Electronically Signed: Dom Noel MD    11/16/2024 12:21 PM EST    Workstation ID: [id number]            ECHOCARDIOGRAM:    Results for orders placed during the hospital encounter of 11/16/24    Adult Transthoracic Echo Complete W/ Cont if Necessary Per Protocol    Interpretation Summary    Left ventricular systolic function is normal. Calculated left ventricular EF = 64.3%    Left ventricular wall thickness is consistent with mild to moderate concentric hypertrophy.    Left ventricular diastolic function is consistent with (grade I) impaired relaxation.    The left atrial cavity is mildly dilated.    There is mild calcification of the aortic valve.    Estimated right ventricular systolic pressure from tricuspid regurgitation is markedly elevated (>55 mmHg).    Transthoracic echocardiography with EF of 64%.  Mild MR and no effusion    Electronically signed by Faustino García MD, 11/17/24, 2:35 PM EST.        I reviewed the patient's new clinical results.    EKG:      Assessment:       CHF (congestive heart failure)    1) acute on chronic diastolic heart failure  - CT chest w/o showed vascular congestion  -TSH WNL  - 2D echo 11/2024 shows an EF of 64% with grade 1 diastolic dysfunction and mild MR.    2) Acute respiratory failure / parainfluenza virus / PNA    3) HTN     4) UTI  Tmax is 97.8 pulse is 99 respirations are 235 blood pressure is 125/58 sats are 99%  proBNP is elevated 2900  Sodium is 137 potassium is 4.6 creatinine 0.7 hemoglobin is 12.2  Normal LV systolic function  Diastolic dysfunction  Current medications include amlodipine 5 mg  p.o. once a day clonidine 0.1 mg p.o. twice a day patient is on hydrochlorothiazide 25 mg p.o. once a day patient is on Toprol-XL 12.5 mg p.o. once a day patient is on Lovenox for DVT prophylaxis  Recommendation based on patient course                      Electronically signed by Derek Vanegas MD at 11/21/24 1008       Gian Pérez MD at 11/21/24 0825          Daily Progress Note        CHF (congestive heart failure)    Assessment:    Acute hypoxic respiratory insufficiency    Elevated troponin and proBNP  Iron deficiency    UTI  >100,000 CFU/mL Klebsiella oxytoca       Viral panel positive for parainfluenza  MRSA DNA probe positive     Recommendations:     Oxygen titration currently on 2 L nasal cannula    Low-dose steroids IV Solu-Medrol 20 mg daily    antibiotic Unasyn    DVT prophylaxis Lovenox 40 mg subcu daily    Protonix 40 mg daily                     LOS: 5 days     Subjective         Objective     Vital signs for last 24 hours:  Vitals:    11/21/24 0645 11/21/24 0649 11/21/24 0739 11/21/24 0806   BP:   119/60 125/58   BP Location:   Right arm    Patient Position:   Lying    Pulse: 103 104 99 99   Resp: (!) 36 (!) 32 (!) 35    Temp:   97.8 °F (36.6 °C)    TempSrc:   Oral    SpO2: 96% 99%     Weight:       Height:           Intake/Output last 3 shifts:  I/O last 3 completed shifts:  In: 360 [P.O.:360]  Out: 1250 [Urine:1250]  Intake/Output this shift:  No intake/output data recorded.      Radiology  Imaging Results (Last 24 Hours)       Procedure Component Value Units Date/Time    FL Video Swallow With Speech Single Contrast [[id number]] Resulted: 11/20/24 1040     Updated: 11/20/24 1040    Narrative:      This procedure was auto-finalized with no dictation required.            Labs:  Results from last 7 days   Lab Units 11/21/24  0136   WBC 10*3/mm3 10.24   HEMOGLOBIN g/dL 12.0*   HEMATOCRIT % 38.2   PLATELETS 10*3/mm3 187     Results from last 7 days   Lab Units 11/21/24  0136 11/16/24  1815  11/16/24  1238   SODIUM mmol/L 137   < > 136   POTASSIUM mmol/L 4.6   < > 4.4   CHLORIDE mmol/L 101   < > 101   CO2 mmol/L 26.2   < > 28.4   BUN mg/dL 31*   < > 29*   CREATININE mg/dL 0.78   < > 0.94   CALCIUM mg/dL 9.0   < > 8.9   BILIRUBIN mg/dL  --   --  0.2   ALK PHOS U/L  --   --  116   ALT (SGPT) U/L  --   --  21   AST (SGOT) U/L  --   --  18   GLUCOSE mg/dL 111*   < > 108*    < > = values in this interval not displayed.         Results from last 7 days   Lab Units 11/16/24  1238   ALBUMIN g/dL 3.3*     Results from last 7 days   Lab Units 11/16/24  1409 11/16/24  1238   HSTROP T ng/L 54* 56*         Results from last 7 days   Lab Units 11/19/24  0446   MAGNESIUM mg/dL 2.0         Results from last 7 days   Lab Units 11/16/24  1815   TSH uIU/mL 0.992           Meds:   SCHEDULE  amLODIPine, 5 mg, Oral, Daily  ampicillin-sulbactam, 3 g, Intravenous, Q6H  brimonidine, 1 drop, Right Eye, Q12H   And  timolol, 1 drop, Right Eye, Q12H  cloNIDine, 0.1 mg, Oral, BID  enoxaparin, 40 mg, Subcutaneous, Q24H  guaiFENesin, 1,200 mg, Oral, Q12H  hydroCHLOROthiazide, 25 mg, Oral, Daily  ipratropium-albuterol, 3 mL, Nebulization, TID - RT  latanoprost, 1 drop, Right Eye, Nightly  methylPREDNISolone sodium succinate, 20 mg, Intravenous, Daily  metoprolol succinate XL, 12.5 mg, Oral, Q24H  pantoprazole, 40 mg, Oral, Q AM  primidone, 250 mg, Oral, TID  sodium chloride, 10 mL, Intravenous, Q12H      Infusions  Pharmacy to Dose enoxaparin (LOVENOX),       PRNs    acetaminophen **OR** acetaminophen **OR** acetaminophen    senna-docusate sodium **AND** polyethylene glycol **AND** bisacodyl **AND** bisacodyl    Calcium Replacement - Follow Nurse / BPA Driven Protocol    hydrALAZINE    Magnesium Standard Dose Replacement - Follow Nurse / BPA Driven Protocol    ondansetron ODT **OR** ondansetron    Pharmacy to Dose enoxaparin (LOVENOX)    Phosphorus Replacement - Follow Nurse / BPA Driven Protocol    Potassium Replacement - Follow Nurse  / BPA Driven Protocol    [COMPLETED] Insert peripheral IV **AND** sodium chloride    sodium chloride    sodium chloride    Physical Exam:  Physical Exam  Cardiovascular:      Heart sounds: Murmur heard.      No gallop.   Pulmonary:      Effort: No respiratory distress.      Breath sounds: No stridor. Rhonchi and rales present. No wheezing.   Chest:      Chest wall: No tenderness.         ROS  Review of Systems   Respiratory:  Positive for cough and shortness of breath. Negative for wheezing and stridor.    Cardiovascular:  Positive for palpitations. Negative for chest pain and leg swelling.             Total time spent with patient greater than: 45 Minutes    Electronically signed by Gian Pérez MD at 24 192       Filomena Melgar APRN at 24 1608       Attestation signed by Faustino García MD at 24 074    I have reviewed this documentation and agree.                  [facility] CARDIOLOGY  [facility]        LOS:  LOS: 4 days   Patient Name: Sathya Flores  Age/Sex: 89 y.o. male  : 1930  MRN: [id number]    Day of Service: 24   Length of Stay: 4  Encounter Provider: BEBO Hess  Place of Service: [facility] CARDIOLOGY  Patient Care Team:  Provider, No Known as PCP - Rogelio Khan MD as PCP - Family Medicine    Subjective:     Chief Complaint:  F/U HF    Subjective:   Patient reports breathing is fair.  Not coughing during exam today.    Current Medications:   Scheduled Meds:amLODIPine, 5 mg, Oral, Daily  ampicillin-sulbactam, 3 g, Intravenous, Q6H  brimonidine, 1 drop, Right Eye, Q12H   And  timolol, 1 drop, Right Eye, Q12H  cloNIDine, 0.1 mg, Oral, BID  enoxaparin, 40 mg, Subcutaneous, Q24H  guaiFENesin, 1,200 mg, Oral, Q12H  [START ON 2024] hydroCHLOROthiazide, 25 mg, Oral, Daily  ipratropium-albuterol, 3 mL, Nebulization, TID - RT  latanoprost, 1 drop, Right Eye,  Nightly  methylPREDNISolone sodium succinate, 20 mg, Intravenous, Daily  metoprolol succinate XL, 12.5 mg, Oral, Q24H  pantoprazole, 40 mg, Oral, Q AM  primidone, 250 mg, Oral, TID  sodium chloride, 10 mL, Intravenous, Q12H      Continuous Infusions:Pharmacy to Dose enoxaparin (LOVENOX),         Allergies:  No Known Allergies    ROS    Objective:     Temp:  [97.5 °F (36.4 °C)-100.4 °F (38 °C)] 100.2 °F (37.9 °C)  Heart Rate:  [] 92  Resp:  [20-32] 24  BP: ()/(47-68) 97/47     Intake/Output Summary (Last 24 hours) at 11/20/2024 1608  Last data filed at 11/20/2024 0400  Gross per 24 hour   Intake 0 ml   Output 1600 ml   Net -1600 ml     Body mass index is 29.36 kg/m².      11/16/24  1630 11/18/24  0405 11/20/24  0400   Weight: 79.7 kg (175 lb 11.3 oz) 78.2 kg (172 lb 6.4 oz) 82.5 kg (181 lb 14.1 oz)         Physical Exam:  Neuro:  CV:  Resp:  GI:  Ext:  Tele: AAOx3, no gross deficits  S1S2 RRR, no murmur, +JVD  shallow, coarse  BS+, abd soft  Pedal pulses palp, trace BLE edema  SR with PACs                                                   Lab Review:   Results from last 7 days   Lab Units 11/20/24  0305 11/19/24  0446 11/16/24  1815 11/16/24  1238   SODIUM mmol/L 137 137   < > 136   POTASSIUM mmol/L 4.6 4.4   < > 4.4   CHLORIDE mmol/L 101 101   < > 101   CO2 mmol/L 28.7 26.4   < > 28.4   BUN mg/dL 28* 32*   < > 29*   CREATININE mg/dL 0.85 0.88   < > 0.94   GLUCOSE mg/dL 91 104*   < > 108*   CALCIUM mg/dL 8.9 8.6   < > 8.9   AST (SGOT) U/L  --   --   --  18   ALT (SGPT) U/L  --   --   --  21    < > = values in this interval not displayed.     Results from last 7 days   Lab Units 11/16/24  1409 11/16/24  1238   HSTROP T ng/L 54* 56*     Results from last 7 days   Lab Units 11/20/24  0305 11/19/24  0446   WBC 10*3/mm3 10.06 13.12*   HEMOGLOBIN g/dL 11.3* 11.4*   HEMATOCRIT % 35.5* 34.8*   PLATELETS 10*3/mm3 160 149         Results from last 7 days   Lab Units 11/19/24  0446 11/16/24  1815   MAGNESIUM mg/dL  "2.0 1.9           Invalid input(s): \"LDLCALC\"  Results from last 7 days   Lab Units 11/16/24  1238   PROBNP pg/mL 2,608.0*     Results from last 7 days   Lab Units 11/16/24  1815   TSH uIU/mL 0.992       Recent Radiology:  Imaging Results (Most Recent)       Procedure Component Value Units Date/Time    FL Video Swallow With Speech Single Contrast [[id number]] Resulted: 11/20/24 1040     Updated: 11/20/24 1040    Narrative:      This procedure was auto-finalized with no dictation required.    XR Chest 1 View [[id number]] Collected: 11/19/24 0952     Updated: 11/19/24 0956    Narrative:      XR CHEST 1 VW    Date of Exam: 11/19/2024 9:46 AM EST    Indication: shortness of breath    Comparison: 11/16/2024 chest radiograph, 11/17/2024 chest CT    Findings:  Mild scattered patchy areas of airspace disease concerning for pneumonia. Underlying emphysema with chronic interstitial lung disease similar to prior studies. No pneumothorax. Severe bilateral glenohumeral osteoarthritis. Heart size top normal,   exaggerated by technique. Aortic arch atherosclerosis. No pneumothorax or pleural effusion.      Impression:      Impression:  1. Mild scattered patchy areas of airspace disease concerning for pneumonia.  2. Underlying emphysema and chronic interstitial lung disease.        Electronically Signed: Landen Mock MD    11/19/2024 9:54 AM EST    Workstation ID: [id number]    CT Chest Without Contrast Diagnostic [[id number]] Collected: 11/17/24 2013     Updated: 11/17/24 2020    Narrative:      CT CHEST WO CONTRAST DIAGNOSTIC    Date of Exam: 11/17/2024 8:00 PM EST    Indication: pulm edema vs infiltrate.    Comparison: Chest CT dated 8/18/2021    Technique: Axial CT images were obtained of the chest without contrast administration.  Sagittal and coronal reconstructions were performed.  Automated exposure control and iterative reconstruction methods were used.      FINDINGS:    Thoracic inlet: Unremarkable.    Great vessels: " Atherosclerotic plaque is seen within the thoracic aorta and proximal arch vessels.    Mediastinum/Anna: Scattered subcentimeter mediastinal lymph nodes are noted, nonspecific. The esophagus appears unremarkable.    Lung parenchyma: Motion artifact limits evaluation of the lungs. Paraseptal emphysematous changes noted within the bilateral upper lobes. Mild hypoventilatory changes within the bilateral lung bases. Mild bilateral lower lobe infiltrates cannot be   excluded.    Trachea and airways: The trachea and central airways appear unremarkable.    Pleural space: No significant pleural effusion or pneumothorax.    Heart and pericardium: Coronary artery calcifications are present. Otherwise, the heart and pericardium appear unremarkable.    Chest wall: No acute or suspicious osseous or soft tissue lesion is identified.    Upper abdomen: No acute abnormality is identified within the visualized upper abdomen.      Impression:      1.Examination is limited due to motion artifact.  2.Mild bilateral lower lobe infiltrates cannot be excluded.  3.Paraseptal emphysematous changes within the bilateral upper lobes.  4.Additional findings as detailed above.        Electronically Signed: Dom Noel MD    11/17/2024 8:18 PM EST    Workstation ID: [id number]    XR Chest 1 View [[id number]] Collected: 11/16/24 1220     Updated: 11/16/24 1223    Narrative:      XR CHEST 1 VW    Date of Exam: 11/16/2024 12:07 PM EST    Indication: cough    Comparison: Chest x-ray dated 6/17/2020    Findings:  Cardiac silhouette is enlarged. Vague bilateral lung opacities may represent edema or mild infiltrates. No significant pleural effusion or pneumothorax. No acute osseous lesion is seen. There are senescent changes of the aorta and skeletal structures.      Impression:      Impression:  1.Findings compatible with mild CHF. Concomitant mild infiltrates cannot be excluded.      Electronically Signed: Dom Noel MD    11/16/2024 12:21 PM EST     Workstation ID: [id number]            ECHOCARDIOGRAM:    Results for orders placed during the hospital encounter of 11/16/24    Adult Transthoracic Echo Complete W/ Cont if Necessary Per Protocol    Interpretation Summary    Left ventricular systolic function is normal. Calculated left ventricular EF = 64.3%    Left ventricular wall thickness is consistent with mild to moderate concentric hypertrophy.    Left ventricular diastolic function is consistent with (grade I) impaired relaxation.    The left atrial cavity is mildly dilated.    There is mild calcification of the aortic valve.    Estimated right ventricular systolic pressure from tricuspid regurgitation is markedly elevated (>55 mmHg).    Transthoracic echocardiography with EF of 64%.  Mild MR and no effusion    Electronically signed by Faustino García MD, 11/17/24, 2:35 PM EST.        I reviewed the patient's new clinical results.    EKG:      Assessment:       CHF (congestive heart failure)    1) acute on chronic diastolic heart failure  - CT chest w/o showed vascular congestion  -TSH WNL  - 2D echo 11/2024 shows an EF of 64% with grade 1 diastolic dysfunction and mild MR.  - s/p IV lasix     2) Acute respiratory failure / parainfluenza virus / PNA  - pulmonary following  - repeat CXR suggests PNA     3) HTN     4) UTI    Plan:   Daily weights trended up today with JVD on exam.  Will give a dose of IV lasix 20 mg x one.  Repeat BNP.  Tele shows SR with frequent PACs.  Continue on beta blocker.        Electronically signed by BEBO Hess, 11/20/24, 4:13 PM EST.     Electronically signed by Faustino García MD at 11/20/24 9119       Chacha Mccarty MD at 11/20/24 1302               LOS: 4 days   Patient Care Team:  Provider, No Known as PCP - Rogelio Khan MD as PCP - Family Medicine    Subjective     Interval History: Improving slightly    Patient Complaints: No specific complaints, up in chair, able to answer questions  appropriately    History taken from: patient    Review of Systems   Constitutional:  Positive for activity change, appetite change and fatigue. Negative for diaphoresis and fever.   HENT:  Negative for facial swelling.    Eyes:  Negative for visual disturbance.   Respiratory:  Positive for cough. Negative for shortness of breath, wheezing and stridor.    Cardiovascular:  Negative for chest pain, palpitations and leg swelling.   Gastrointestinal:  Negative for abdominal pain, constipation, diarrhea, nausea and vomiting.   Endocrine: Negative for polyuria.   Genitourinary:  Negative for dysuria.   Musculoskeletal:  Positive for arthralgias and gait problem.   Neurological:  Negative for headaches.   Psychiatric/Behavioral:  Negative for confusion.            Objective     Vital Signs  Temp:  [97.5 °F (36.4 °C)-100.4 °F (38 °C)] 97.5 °F (36.4 °C)  Heart Rate:  [] 84  Resp:  [20-32] 22  BP: (111-127)/(58-68) 126/58    Physical Exam:     General Appearance:    Alert, cooperative, in no acute distress, frail   Head:    Normocephalic, without obvious abnormality, atraumatic   Eyes:            Lids and lashes normal, conjunctivae and sclerae normal, no   icterus, no pallor, corneas clear, PERRLA   Ears:    Ears appear intact with no abnormalities noted   Throat:   No oral lesions, no thrush, oral mucosa moist   Neck:   No adenopathy, supple, trachea midline, no thyromegaly, no   carotid bruit, no JVD   Lungs:     Scattered rhonchi    Heart:    Regular rhythm and normal rate, normal S1 and S2, no            murmur, no gallop, no rub, no click   Chest Wall:    No abnormalities observed   Abdomen:     Normal bowel sounds, no masses, no organomegaly, soft        Non-tender non-distended, no guarding,   Extremities:   Moves all extremities well, no edema, no cyanosis, no             Redness   Pulses:   Pulses palpable and equal bilaterally   Skin:   Scattered scabbed lesions on face, scalp, ears, chest wall, arms   Lymph  nodes:   No palpable adenopathy   Neurologic:   No localizing deficits, gait not assessed.        Results Review:    Lab Results (last 24 hours)       Procedure Component Value Units Date/Time    Basic Metabolic Panel [[id number]]  (Abnormal) Collected: 11/20/24 0305    Specimen: Blood Updated: 11/20/24 0402     Glucose 91 mg/dL      BUN 28 mg/dL      Creatinine 0.85 mg/dL      Sodium 137 mmol/L      Potassium 4.6 mmol/L      Comment: Specimen hemolyzed.  Result may be falsely elevated.        Chloride 101 mmol/L      CO2 28.7 mmol/L      Calcium 8.9 mg/dL      BUN/Creatinine Ratio 32.9     Anion Gap 7.3 mmol/L      eGFR 80.5 mL/min/1.73     Narrative:      GFR Normal >60  Chronic Kidney Disease <60  Kidney Failure <15    The GFR formula is only valid for adults with stable renal function between ages 18 and 70.    CBC & Differential [[id number]]  (Abnormal) Collected: 11/20/24 0305    Specimen: Blood Updated: 11/20/24 0324    Narrative:      The following orders were created for panel order CBC & Differential.  Procedure                               Abnormality         Status                     ---------                               -----------         ------                     CBC Auto Differential[[id number]]        Abnormal            Final result                 Please view results for these tests on the individual orders.    CBC Auto Differential [[id number]]  (Abnormal) Collected: 11/20/24 0305    Specimen: Blood Updated: 11/20/24 0324     WBC 10.06 10*3/mm3      RBC 3.54 10*6/mm3      Hemoglobin 11.3 g/dL      Hematocrit 35.5 %      .3 fL      MCH 31.9 pg      MCHC 31.8 g/dL      RDW 14.2 %      RDW-SD 53.3 fl      MPV 10.5 fL      Platelets 160 10*3/mm3      Neutrophil % 65.9 %      Lymphocyte % 18.7 %      Monocyte % 14.5 %      Eosinophil % 0.3 %      Basophil % 0.1 %      Immature Grans % 0.5 %      Neutrophils, Absolute 6.63 10*3/mm3      Lymphocytes, Absolute 1.88 10*3/mm3      Monocytes,  Absolute 1.46 10*3/mm3      Eosinophils, Absolute 0.03 10*3/mm3      Basophils, Absolute 0.01 10*3/mm3      Immature Grans, Absolute 0.05 10*3/mm3      nRBC 0.0 /100 WBC              Imaging Results (Last 24 Hours)       Procedure Component Value Units Date/Time    FL Video Swallow With Speech Single Contrast [[id number]] Resulted: 11/20/24 1040     Updated: 11/20/24 1040    Narrative:      This procedure was auto-finalized with no dictation required.                 I reviewed the patient's new clinical results.    Medication Review:   Scheduled Meds:amLODIPine, 5 mg, Oral, Daily  ampicillin-sulbactam, 3 g, Intravenous, Q6H  brimonidine, 1 drop, Right Eye, Q12H   And  timolol, 1 drop, Right Eye, Q12H  cloNIDine, 0.1 mg, Oral, BID  enoxaparin, 40 mg, Subcutaneous, Q24H  guaiFENesin, 1,200 mg, Oral, Q12H  ipratropium-albuterol, 3 mL, Nebulization, TID - RT  latanoprost, 1 drop, Right Eye, Nightly  methylPREDNISolone sodium succinate, 20 mg, Intravenous, Daily  metoprolol succinate XL, 12.5 mg, Oral, Q24H  pantoprazole, 40 mg, Oral, Q AM  primidone, 250 mg, Oral, TID  sodium chloride, 10 mL, Intravenous, Q12H      Continuous Infusions:Pharmacy to Dose enoxaparin (LOVENOX),       PRN Meds:.  acetaminophen **OR** acetaminophen **OR** acetaminophen    senna-docusate sodium **AND** polyethylene glycol **AND** bisacodyl **AND** bisacodyl    Calcium Replacement - Follow Nurse / BPA Driven Protocol    hydrALAZINE    Magnesium Standard Dose Replacement - Follow Nurse / BPA Driven Protocol    ondansetron ODT **OR** ondansetron    Pharmacy to Dose enoxaparin (LOVENOX)    Phosphorus Replacement - Follow Nurse / BPA Driven Protocol    Potassium Replacement - Follow Nurse / BPA Driven Protocol    [COMPLETED] Insert peripheral IV **AND** sodium chloride    sodium chloride    sodium chloride     Assessment & Plan       CHF (congestive heart failure)  - appears euvolemic and well compensated. Will restart HCTZ.    Parainfluenza -  resolving    Aspiration pneumonia - WBC trending down after change to Unasyn - continue    Dysphagia/aspiration - swallow study findings noted; now on altered diet    Tremor - currently on primidone; PT noted shuffling gait; unclear if patient has been diagnosed with Parkinson's - will try to find records in office system.      Glaucoma - home meds  HTN - clonidine, metoprolol, amlodipine    Lovenox for dvt prophy    Plan for disposition:SNF    Chacha Mccarty MD  11/20/24  15:02 EST            Electronically signed by Chacha Mccarty MD at 11/20/24 1526          Consult Notes (last 48 hours)        Eleanor Sampson MD at 11/21/24 1405        Consult Orders    1. Inpatient Neurology Consult General [[id number]] ordered by Chacha Mccarty MD at 11/20/24 6692                 2  Primary Care Provider: Provider, No Known     Consult requested by: BEBO Pedersen    Reason for Consultation: Neurological evaluation /tremors, shuffling gait, rule out parkinsonism    History taken from: chart RN    Chief complaint: He was admitted for shortness of air    Subjective   SUBJECTIVE:    History of present illness: Background per H&P: Sathya Flores is a 89 y.o. male who was evaluated in room 2210 at [facility]    Source of information is mostly the medical records    He was admitted on the 17th for shortness of air and fatigue.  He was diagnosed with parainfluenza and other conditions and congestive heart failure    He is a frail 89-year-old gentleman and he has history of tremors  Neurology consultation was obtained because of concern for parkinsonism    He is so frail and difficult to evaluate    He is on primidone but he cannot tell me who is giving him his primidone and what kind of movement disorder he has    He has generalized weakness    When distracted some pill-rolling type tremor was seen    The question is whether to treat such patients or not because they are very difficult to titrate and since we are  hospital-based physicians we do not do outpatient follow-ups and sometimes even their medications are not followed and if there are side effects there is no way to contact us or for us to make changes    He is still weak and has so many medical issues, does he have any cognitive problems    I do not have much of this information    There is not even a head CT done    There is nothing suggesting seizure or passing out or large strokes though or CNS infection        As per admitting,  Patient is a 89 y.o. male who presents with past medical history of hypertension, tremors, and glaucoma. He presented from free standing ER due to edema and shortness of breath. Per note from ER he does not lay flat and stay in a recliner. Spoke with son in law and he states patient lives alone and this is his first hospitalization. He states that family check in with him three times daily and more. He has been eating and drinking ok, has had some urinary urgency, and he agrees he is more confused but that is not his norm. Patient at examination is [place] and mildly confused. IN the ED, D-dimer elevated, cxr with pulmonary edema, leukocytosis,elevated troponin, and UTI with parainfluenzal virus and MRSA +.        As per cardiology,  Elderly patient with cough with mild fluid overload and elevated BNP  Medical management only  EKG shows sinus rhythm with PACs  Echo reviewed with normal EF with mild MR  Treat with gentle diuresis  Continue antihypertensive medications  D-dimer elevated and CT angio pending  Positive for parainfluenza virus         As per pulmonary,  89-year-old male presented on 11/17/2024 from urgent care with increasing shortness of breath, chest x-ray on 11/16/2024 showed bilateral alveolar infiltrates suggestive of pulmonary edema        Assessment:  Acute hypoxic respiratory insufficiency  Chest x-ray suggestive of pulmonary edema  Elevated troponin and proBNP  Iron deficiency  Elevated D-dimer  Abnormal UA  Viral panel  positive for parainfluenza  MRSA DNA probe positive     Recommendations:     CT PE protocol  Oxygen titration  Low-dose steroids IV Solu-Medrol 20 mg daily  Empiric antibiotic Rocephin  Check urine cultures  DVT prophylaxis Lovenox 40 mg subcu daily  Protonix 40 mg daily      - Portions of the above HPI were copied from previous encounters and edited as appropriate. PMH as detailed below.     Review of Systems   Really not possible because of his present condition, he is very weak and lethargic    PATIENT HISTORY:  History reviewed. No pertinent past medical history., History reviewed. No pertinent surgical history., History reviewed. No pertinent family history.,   Social History     Tobacco Use    Smoking status: Former     Types: Cigarettes   Vaping Use    Vaping status: Never Used   Substance Use Topics    Alcohol use: Not Currently    Drug use: Never   ,   Prior to Admission medications    Medication Sig Start Date End Date Taking? Authorizing Provider   brimonidine-timolol (COMBIGAN) 0.2-0.5 % ophthalmic solution Administer 1 drop to the right eye Every 12 (Twelve) Hours.   Yes Shayy Maloney MD   latanoprost (XALATAN) 0.005 % ophthalmic solution Administer 1 drop to the right eye Every Night.   Yes Shayy Maloney MD   acetaminophen (TYLENOL) 325 MG tablet Take 2 tablets by mouth Every 4 (Four) Hours As Needed for Mild Pain.    Shayy Maloney MD   amLODIPine (NORVASC) 5 MG tablet Take 1 tablet by mouth Daily.    Shayy Maloney MD   cloNIDine (CATAPRES) 0.1 MG tablet Take 1 tablet by mouth 2 (Two) Times a Day.    Shayy Maloney MD   lisinopril-hydrochlorothiazide (PRINZIDE,ZESTORETIC) 20-12.5 MG per tablet Take 1 tablet by mouth Daily.    hSayy Maloney MD   PENTOXIFYLLINE ER PO Take 400 mg by mouth 3 (Three) Times a Day.    Shayy Maloney MD   primidone (MYSOLINE) 250 MG tablet Take 1 tablet by mouth 3 (Three) Times a Day.    Shayy Maloney MD   vitamin  B-6 (PYRIDOXINE) 50 MG tablet Take 0.5 tablets by mouth Daily.    Provider, MD Shayy    Allergies:  Patient has no known allergies.    Current Facility-Administered Medications   Medication Dose Route Frequency Provider Last Rate Last Admin    acetaminophen (TYLENOL) tablet 650 mg  650 mg Oral Q4H PRN Nasreen De La Cruz MD   650 mg at 11/18/24 1042    Or    acetaminophen (TYLENOL) 160 MG/5ML oral solution 650 mg  650 mg Oral Q4H PRN Nasreen De La Cruz MD   650 mg at 11/19/24 0556    Or    acetaminophen (TYLENOL) suppository 650 mg  650 mg Rectal Q4H PRN Nasreen De La Cruz MD        amLODIPine (NORVASC) tablet 5 mg  5 mg Oral Daily Filomena Melgar APRN   5 mg at 11/21/24 0806    ampicillin-sulbactam (UNASYN) 3 g in sodium chloride 0.9 % 100 mL MBP  3 g Intravenous Q6H Chacha Mccarty MD   3 g at 11/21/24 0757    sennosides-docusate (PERICOLACE) 8.6-50 MG per tablet 2 tablet  2 tablet Oral BID PRN Nasreen De La Cruz MD        And    polyethylene glycol (MIRALAX) packet 17 g  17 g Oral Daily PRN Nasreen De La Cruz MD        And    bisacodyl (DULCOLAX) EC tablet 5 mg  5 mg Oral Daily PRN Nasreen De La Cruz MD        And    bisacodyl (DULCOLAX) suppository 10 mg  10 mg Rectal Daily PRN Nasreen De La Cruz MD        brimonidine (ALPHAGAN) 0.2 % ophthalmic solution 1 drop  1 drop Right Eye Q12H Chacha Mccarty MD   1 drop at 11/21/24 0807    And    timolol (TIMOPTIC) 0.5 % ophthalmic solution 1 drop  1 drop Right Eye Q12H Chacha Mccarty MD   1 drop at 11/21/24 0807    Calcium Replacement - Follow Nurse / BPA Driven Protocol   Not Applicable PRNasreen Vilchis MD        cloNIDine (CATAPRES) tablet 0.1 mg  0.1 mg Oral BID [place], APRN   0.1 mg at 11/21/24 0807    Enoxaparin Sodium (LOVENOX) syringe 40 mg  40 mg Subcutaneous Q24H Nasreen De La Cruz MD   40 mg at 11/20/24 1904    guaiFENesin (MUCINEX) 12 hr tablet 1,200 mg  1,200 mg Oral Q12H Chacha Mccarty MD   1,200 mg at 11/21/24 0806    hydrALAZINE (APRESOLINE) injection 10 mg   10 mg Intravenous Q6H PRN Nasreen De La Cruz MD        hydroCHLOROthiazide tablet 25 mg  25 mg Oral Daily Chacha Mccarty MD   25 mg at 11/21/24 0807    ipratropium-albuterol (DUO-NEB) nebulizer solution 3 mL  3 mL Nebulization TID - RT Chacha Mccarty MD   3 mL at 11/21/24 0645    latanoprost (XALATAN) 0.005 % ophthalmic solution 1 drop  1 drop Right Eye Nightly Chacha Mccarty MD   1 drop at 11/20/24 2038    Magnesium Standard Dose Replacement - Follow Nurse / BPA Driven Protocol   Not Applicable PRN Nasreen eD La Cruz MD        methylPREDNISolone sodium succinate (SOLU-Medrol) injection 20 mg  20 mg Intravenous Daily Gian Pérez MD   20 mg at 11/21/24 0758    metoprolol succinate XL (TOPROL-XL) 24 hr tablet 12.5 mg  12.5 mg Oral Q24H Nasreen De La Cruz MD   12.5 mg at 11/21/24 0806    ondansetron ODT (ZOFRAN-ODT) disintegrating tablet 4 mg  4 mg Oral Q6H PRN Nasreen De La Cruz MD        Or    ondansetron (ZOFRAN) injection 4 mg  4 mg Intravenous Q6H PRN Nasreen De La Cruz MD        pantoprazole (PROTONIX) EC tablet 40 mg  40 mg Oral Q AM Chacha Mccarty MD   40 mg at 11/21/24 0806    Pharmacy to Dose enoxaparin (LOVENOX)   Not Applicable Continuous PRNasreen Vilchis MD        Phosphorus Replacement - Follow Nurse / BPA Driven Protocol   Not Applicable PRN Nasreen De La Cruz MD        Potassium Replacement - Follow Nurse / BPA Driven Protocol   Not Applicable PRNasreen Vilchis MD        primidone (MYSOLINE) tablet 250 mg  250 mg Oral TID Candelaria Spears APRN   250 mg at 11/21/24 0806    sodium chloride 0.9 % flush 10 mL  10 mL Intravenous PRN Sathya Weinstein MD        sodium chloride 0.9 % flush 10 mL  10 mL Intravenous Q12H Nasreen De La Cruz MD   10 mL at 11/21/24 0757    sodium chloride 0.9 % flush 10 mL  10 mL Intravenous PRN Nasreen De La Cruz MD        sodium chloride 0.9 % infusion 40 mL  40 mL Intravenous Nasreen Cade MD            ________________________________________________________     Objective    OBJECTIVE:  Upon today's exam, patient was sitting in bedside chair in no acute distress but very weak      The patient is awake lethargic and oriented to himself only  He can name and he can follow commands and repeat  Cranial nerve examination demonstrate:  Full fields of vision to confrontation  Pupils are round, reactive to light and accommodation and size of about 3 mm  No ptosis or nystagmus  Funduscopic examination was not successful  Eye movements are conjugate     Sensation on the face and scalp are normal  Muscles of mastication are normal and symmetric  Muscles of  facial expression are normal and symmetric  Hearing is intact bilaterally  Head turning and shoulder shrugs were unremarkable  Tongue was midline  I could not visualize  oropharynx or uvula     Motor examination:  Increased tone and strength was mostly 4 -/5  No fasciculations     Sensory examination:  Intact for soft touch, pain   Romberg was not evaluated     Reflexes:  0/4     Coordination:  He had some pill-rolling type tremor and he really struggled with any kind of repetitive movements or finger-to-nose to finger     Gait:  Deferred     Toe signs:  [place]            ________________________________________________________   RESULTS REVIEW:    VITAL SIGNS:   Temp:  [97.8 °F (36.6 °C)-100.3 °F (37.9 °C)] 98.3 °F (36.8 °C)  Heart Rate:  [] 80  Resp:  [20-39] 33  BP: ()/(47-82) 99/56     LABS:      Lab 11/21/24  0136 11/20/24  0305 11/19/24  0446 11/18/24  0456 11/17/24  0622 11/16/24  2204   WBC 10.24 10.06 13.12* 15.43* 12.50*  --    HEMOGLOBIN 12.0* 11.3* 11.4* 12.1* 13.2  --    HEMATOCRIT 38.2 35.5* 34.8* 38.7 40.3  --    PLATELETS 187 160 149 163 195  --    NEUTROS ABS 7.06* 6.63 8.88* 9.12* 8.64*  --    IMMATURE GRANS (ABS) 0.06* 0.05 0.06* 0.07* 0.04  --    LYMPHS ABS 1.60 1.88 1.90 3.28* 1.93  --    MONOS ABS 1.45* 1.46* 2.24* 2.92* 1.84*  --    EOS ABS 0.04 0.03 0.02 0.01 0.02  --    .8* 100.3* 97.5* 99.7* 97.3*   --    D DIMER QUANT  --   --   --   --   --  2.86*         Lab 11/21/24  0136 11/20/24  0305 11/19/24  0446 11/18/24  0456 11/17/24  0622 11/16/24  1815   SODIUM 137 137 137 138 139 138   POTASSIUM 4.6 4.6 4.4 4.2 4.3 4.4   CHLORIDE 101 101 101 100 99 102   CO2 26.2 28.7 26.4 29.3* 29.0 25.5   ANION GAP 9.8 7.3 9.6 8.7 11.0 10.5   BUN 31* 28* 32* 29* 23 27*   CREATININE 0.78 0.85 0.88 1.08 0.87 0.81   EGFR 82.6 80.5 79.7 63.6 80.0 81.7   GLUCOSE 111* 91 104* 105* 100* 127*   CALCIUM 9.0 8.9 8.6 8.9 9.4 9.0   MAGNESIUM  --   --  2.0  --   --  1.9   TSH  --   --   --   --   --  0.992         Lab 11/16/24  1238   TOTAL PROTEIN 7.0   ALBUMIN 3.3*   GLOBULIN 3.7   ALT (SGPT) 21   AST (SGOT) 18   BILIRUBIN 0.2   ALK PHOS 116         Lab 11/21/24  0136 11/16/24  1409 11/16/24  1238   PROBNP 2,992.0*  --  2,608.0*   HSTROP T  --  54* 56*             Lab 11/16/24  1815   IRON 23*   IRON SATURATION (TSAT) 10*   TIBC 240*   TRANSFERRIN 161*         UA          11/16/2024    17:15 11/17/2024    12:13   Urinalysis   Squamous Epithelial Cells, UA None Seen  0-2    Specific Gravity, UA 1.017  1.018    Ketones, UA Negative  Trace    Blood, UA Small (1+)  Moderate (2+)    Leukocytes, UA Large (3+)  Moderate (2+)    Nitrite, UA Positive  Positive    RBC, UA 0-2  6-10    WBC, UA Too Numerous to Count  Too Numerous to Count    Bacteria, UA 4+  4+        Lab Results   Component Value Date    TSH 0.992 11/16/2024       IMAGING STUDIES:  No radiology results for the last day    I reviewed the patient's new clinical results.    ________________________________________________________     PROBLEM LIST:    CHF (congestive heart failure)            ASSESSMENT/PLAN:  Generalized weakness  Rigidity  Tremor, some pill-rolling characteristics    It is very difficult to evaluate and treat patients like his condition with multiple medical issues going on    I am reluctant but I may try some Sinemet and see how he does and also get a head CT and  labs      I discussed the patient's findings and my recommendations with patient and nursing staff    Eleanor Sampson MD  11/21/24  14:05 EST          Electronically signed by Eleanor Sampson MD at 11/21/24 1412          Discharge Summary        Candelaria Spears APRN at 11/22/24 0709            Date of Discharge:  11/22/2024    Discharge Diagnosis:   Dyspnea dyspnea  Pulmonary edema  Fluid overload  Aspiration pna  Parainfluenza virus  MRSA positive  Elevated troponin  Elevated D-dimer  Glaucoma  Urinary tract infection  Hypertension  Essential tremor    Presenting Problem/History of Present Illness  Active Hospital Problems    Diagnosis  POA    **CHF (congestive heart failure) [I50.9]  Yes    Acute heart failure with preserved ejection fraction (HFpEF) [I50.31]  Yes      Resolved Hospital Problems   No resolved problems to display.          Hospital Course    Patient is a 89 y.o. male presented with past medical history of hypertension, tremors, and glaucoma. He presented from free standing ER due to edema and shortness of breath. Per note from ER he does not lay flat and stay in a recliner. Spoke with son in law and he states patient lives alone and this is his first hospitalization. He states that family check in with him three times daily and more.  Patient was treated for pulmonary edema, fluid overload, aspiration pneumonia, parainfluenza virus and urinary tract infection.  Patient was seen by pulmonary, neurology and cardiology.  He was treated with gentle diuretics, antibiotics and supportive care. UTI grew klebsiella oxytoca. He was seen by speech therapy and diet altered for dysphagia.  He is feeling improved and ready for rehab.  He has been hemodynamically stable.  He will need follow-up PCP in 2 weeks.    Procedures Performed         Consults:   Consults       Date and Time Order Name Status Description    11/20/2024  3:02 PM Inpatient Neurology Consult General Completed     11/17/2024 10:19 AM  Inpatient Pulmonology Consult Completed     11/16/2024  7:24 PM Inpatient Cardiology Consult              Pertinent Test Results:    Lab Results (most recent)       Procedure Component Value Units Date/Time    Basic Metabolic Panel [[id number]]  (Abnormal) Collected: 11/21/24 0136    Specimen: Blood Updated: 11/21/24 0229     Glucose 111 mg/dL      BUN 31 mg/dL      Creatinine 0.78 mg/dL      Sodium 137 mmol/L      Potassium 4.6 mmol/L      Comment: Specimen hemolyzed.  Result may be falsely elevated.        Chloride 101 mmol/L      CO2 26.2 mmol/L      Calcium 9.0 mg/dL      BUN/Creatinine Ratio 39.7     Anion Gap 9.8 mmol/L      eGFR 82.6 mL/min/1.73     Narrative:      GFR Normal >60  Chronic Kidney Disease <60  Kidney Failure <15    The GFR formula is only valid for adults with stable renal function between ages 18 and 70.    BNP [[id number]]  (Abnormal) Collected: 11/21/24 0136    Specimen: Blood Updated: 11/21/24 0227     proBNP 2,992.0 pg/mL     Narrative:      This assay is used as an aid in the diagnosis of individuals suspected of having heart failure. It can be used as an aid in the diagnosis of acute decompensated heart failure (ADHF) in patients presenting with signs and symptoms of ADHF to the emergency department (ED). In addition, NT-proBNP of <300 pg/mL indicates ADHF is not likely.    Age Range Result Interpretation  NT-proBNP Concentration (pg/mL:      <50             Positive            >450                   Gray                 300-450                    Negative             <300    50-75           Positive            >900                  Gray                300-900                  Negative            <300      >75             Positive            >1800                  Gray                300-1800                  Negative            <300    CBC & Differential [[id number]]  (Abnormal) Collected: 11/21/24 0136    Specimen: Blood Updated: 11/21/24 0202    Narrative:      The following orders were  created for panel order CBC & Differential.  Procedure                               Abnormality         Status                     ---------                               -----------         ------                     CBC Auto Differential[[id number]]        Abnormal            Final result                 Please view results for these tests on the individual orders.    CBC Auto Differential [[id number]]  (Abnormal) Collected: 11/21/24 0136    Specimen: Blood Updated: 11/21/24 0202     WBC 10.24 10*3/mm3      RBC 3.79 10*6/mm3      Hemoglobin 12.0 g/dL      Hematocrit 38.2 %      .8 fL      MCH 31.7 pg      MCHC 31.4 g/dL      RDW 14.2 %      RDW-SD 52.9 fl      MPV 10.5 fL      Platelets 187 10*3/mm3      Neutrophil % 68.9 %      Lymphocyte % 15.6 %      Monocyte % 14.2 %      Eosinophil % 0.4 %      Basophil % 0.3 %      Immature Grans % 0.6 %      Neutrophils, Absolute 7.06 10*3/mm3      Lymphocytes, Absolute 1.60 10*3/mm3      Monocytes, Absolute 1.45 10*3/mm3      Eosinophils, Absolute 0.04 10*3/mm3      Basophils, Absolute 0.03 10*3/mm3      Immature Grans, Absolute 0.06 10*3/mm3      nRBC 0.0 /100 WBC     Basic Metabolic Panel [[id number]]  (Abnormal) Collected: 11/20/24 0305    Specimen: Blood Updated: 11/20/24 0402     Glucose 91 mg/dL      BUN 28 mg/dL      Creatinine 0.85 mg/dL      Sodium 137 mmol/L      Potassium 4.6 mmol/L      Comment: Specimen hemolyzed.  Result may be falsely elevated.        Chloride 101 mmol/L      CO2 28.7 mmol/L      Calcium 8.9 mg/dL      BUN/Creatinine Ratio 32.9     Anion Gap 7.3 mmol/L      eGFR 80.5 mL/min/1.73     Narrative:      GFR Normal >60  Chronic Kidney Disease <60  Kidney Failure <15    The GFR formula is only valid for adults with stable renal function between ages 18 and 70.    CBC & Differential [[id number]]  (Abnormal) Collected: 11/20/24 0305    Specimen: Blood Updated: 11/20/24 0324    Narrative:      The following orders were created for panel order CBC  & Differential.  Procedure                               Abnormality         Status                     ---------                               -----------         ------                     CBC Auto Differential[[id number]]        Abnormal            Final result                 Please view results for these tests on the individual orders.    CBC Auto Differential [[id number]]  (Abnormal) Collected: 11/20/24 0305    Specimen: Blood Updated: 11/20/24 0324     WBC 10.06 10*3/mm3      RBC 3.54 10*6/mm3      Hemoglobin 11.3 g/dL      Hematocrit 35.5 %      .3 fL      MCH 31.9 pg      MCHC 31.8 g/dL      RDW 14.2 %      RDW-SD 53.3 fl      MPV 10.5 fL      Platelets 160 10*3/mm3      Neutrophil % 65.9 %      Lymphocyte % 18.7 %      Monocyte % 14.5 %      Eosinophil % 0.3 %      Basophil % 0.1 %      Immature Grans % 0.5 %      Neutrophils, Absolute 6.63 10*3/mm3      Lymphocytes, Absolute 1.88 10*3/mm3      Monocytes, Absolute 1.46 10*3/mm3      Eosinophils, Absolute 0.03 10*3/mm3      Basophils, Absolute 0.01 10*3/mm3      Immature Grans, Absolute 0.05 10*3/mm3      nRBC 0.0 /100 WBC     Urine Culture - Urine, Urine, Clean Catch [[id number]]  (Abnormal)  (Susceptibility) Collected: 11/17/24 1213    Specimen: Urine, Clean Catch Updated: 11/19/24 1037     Urine Culture >100,000 CFU/mL Klebsiella oxytoca    Narrative:      Colonization of the urinary tract without infection is common. Treatment is discouraged unless the patient is symptomatic, pregnant, or undergoing an invasive urologic procedure.    Susceptibility        Klebsiella oxytoca      GAVI      Amoxicillin + Clavulanate Susceptible      Ampicillin Resistant      Ampicillin + Sulbactam Susceptible      Cefazolin (Urine) Susceptible      Cefepime Susceptible      Ceftazidime Susceptible      Ceftriaxone Susceptible      Gentamicin Susceptible      Levofloxacin Susceptible      Nitrofurantoin Susceptible      Piperacillin + Tazobactam Susceptible       Trimethoprim + Sulfamethoxazole Susceptible                           Magnesium [[id number]]  (Normal) Collected: 11/19/24 0446    Specimen: Blood Updated: 11/19/24 0537     Magnesium 2.0 mg/dL     Urinalysis With Culture If Indicated - Urine, Clean Catch [[id number]]  (Abnormal) Collected: 11/17/24 1213    Specimen: Urine, Clean Catch Updated: 11/17/24 1226     Color, UA Yellow     Appearance, UA Cloudy     pH, UA 7.5     Specific Gravity, UA 1.018     Glucose, UA Negative     Ketones, UA Trace     Bilirubin, UA Negative     Blood, UA Moderate (2+)     Protein, UA 30 mg/dL (1+)     Leuk Esterase, UA Moderate (2+)     Nitrite, UA Positive     Urobilinogen, UA 1.0 E.U./dL    Narrative:      In absence of clinical symptoms, the presence of pyuria, bacteria, and/or nitrites on the urinalysis result does not correlate with infection.    Urinalysis, Microscopic Only - Urine, Clean Catch [[id number]]  (Abnormal) Collected: 11/17/24 1213    Specimen: Urine, Clean Catch Updated: 11/17/24 1226     RBC, UA 6-10 /HPF      WBC, UA Too Numerous to Count /HPF      Bacteria, UA 4+ /HPF      Squamous Epithelial Cells, UA 0-2 /HPF      Hyaline Casts, UA 3-6 /LPF      Methodology Automated Microscopy    Extra Tubes [[id number]] Collected: 11/17/24 0622    Specimen: Blood from Arm, Right Updated: 11/17/24 0630    Narrative:      The following orders were created for panel order Extra Tubes.  Procedure                               Abnormality         Status                     ---------                               -----------         ------                     Light Blue Top[[id number]]                                   Final result                 Please view results for these tests on the individual orders.    Light Blue Top [[id number]] Collected: 11/17/24 0622    Specimen: Blood from Arm, Right Updated: 11/17/24 0630     Extra Tube Hold for add-ons.     Comment: Auto resulted       D-dimer, Quantitative [[id number]]  (Abnormal)  "Collected: 11/16/24 2204    Specimen: Blood from Arm, Right Updated: 11/16/24 2232     D-Dimer, Quantitative 2.86 MCGFEU/mL     Narrative:      According to the assay 's published package insert, a normal (<0.50 MCGFEU/mL) D-dimer result in conjunction with a non-high clinical probability assessment, excludes deep vein thrombosis (DVT) and pulmonary embolism (PE) with high sensitivity.    D-dimer values increase with age and this can make VTE exclusion of an older population difficult. To address this, the [employer], based on best available evidence and recent guidelines, recommends that clinicians use age-adjusted D-dimer thresholds in patients greater than 50 years of age with: a) a low probability of PE who do not meet all Pulmonary Embolism Rule Out Criteria, or b) in those with intermediate probability of PE.   The formula for an age-adjusted D-dimer cut-off is \"age/100\".  For example, a 60 year old patient would have an age-adjusted cut-off of 0.60 MCGFEU/mL and an 80 year old 0.80 MCGFEU/mL.    Magnesium [[id number]]  (Normal) Collected: 11/16/24 1815    Specimen: Blood Updated: 11/16/24 1902     Magnesium 1.9 mg/dL     Iron Profile [[id number]]  (Abnormal) Collected: 11/16/24 1815    Specimen: Blood Updated: 11/16/24 1857     Iron 23 mcg/dL      Iron Saturation (TSAT) 10 %      Transferrin 161 mg/dL      TIBC 240 mcg/dL     TSH Rfx On Abnormal To Free T4 [[id number]]  (Normal) Collected: 11/16/24 1815    Specimen: Blood Updated: 11/16/24 1857     TSH 0.992 uIU/mL     MRSA Screen, PCR (Inpatient) - Swab, Nares [[id number]]  (Abnormal) Collected: 11/16/24 1715    Specimen: Swab from Nares Updated: 11/16/24 1835     MRSA PCR MRSA Detected    Narrative:      The negative predictive value of this diagnostic test is high and should only be used to consider de-escalating anti-MRSA therapy. A positive result may indicate colonization with MRSA and must be correlated clinically.    " Respiratory Panel PCR w/COVID-19(SARS-CoV-2) LEVI/NIKO/JOSE ARMANDO/PAD/COR/RYNE In-House, NP Swab in UTM/VTM, 2 HR TAT - Swab, Nasopharynx [[id number]]  (Abnormal) Collected: 11/16/24 1715    Specimen: Swab from Nasopharynx Updated: 11/16/24 1815     ADENOVIRUS, PCR Not Detected     Coronavirus 229E Not Detected     Coronavirus HKU1 Not Detected     Coronavirus NL63 Not Detected     Coronavirus OC43 Not Detected     COVID19 Not Detected     Human Metapneumovirus Not Detected     Human Rhinovirus/Enterovirus Not Detected     Influenza A PCR Not Detected     Influenza B PCR Not Detected     Parainfluenza Virus 1 Not Detected     Parainfluenza Virus 2 Not Detected     Parainfluenza Virus 3 Not Detected     Parainfluenza Virus 4 Detected     RSV, PCR Not Detected     Bordetella pertussis pcr Not Detected     Bordetella parapertussis PCR Not Detected     Chlamydophila pneumoniae PCR Not Detected     Mycoplasma pneumo by PCR Not Detected    Narrative:      In the setting of a positive respiratory panel with a viral infection PLUS a negative procalcitonin without other underlying concern for bacterial infection, consider observing off antibiotics or discontinuation of antibiotics and continue supportive care. If the respiratory panel is positive for atypical bacterial infection (Bordetella pertussis, Chlamydophila pneumoniae, or Mycoplasma pneumoniae), consider antibiotic de-escalation to target atypical bacterial infection.    Urinalysis With Microscopic If Indicated (No Culture) - Urine, Clean Catch [[id number]]  (Abnormal) Collected: 11/16/24 1715    Specimen: Urine, Clean Catch Updated: 11/16/24 1752     Color, UA Yellow     Appearance, UA Clear     pH, UA 5.5     Specific Gravity, UA 1.017     Glucose, UA Negative     Ketones, UA Negative     Bilirubin, UA Negative     Blood, UA Small (1+)     Protein, UA Trace     Leuk Esterase, UA Large (3+)     Nitrite, UA Positive     Urobilinogen, UA 1.0 E.U./dL    Urinalysis, Microscopic  Only - Urine, Clean Catch [[id number]]  (Abnormal) Collected: 11/16/24 1715    Specimen: Urine, Clean Catch Updated: 11/16/24 1752     RBC, UA 0-2 /HPF      WBC, UA Too Numerous to Count /HPF      Bacteria, UA 4+ /HPF      Squamous Epithelial Cells, UA None Seen /HPF      Hyaline Casts, UA None Seen /LPF      Methodology Automated Microscopy    High Sensitivity Troponin T 2Hr [[id number]]  (Abnormal) Collected: 11/16/24 1409    Specimen: Blood Updated: 11/16/24 1429     HS Troponin T 54 ng/L      Troponin T Delta -2 ng/L     Narrative:      High Sensitive Troponin T Reference Range:  <14.0 ng/L- Negative Female for AMI  <22.0 ng/L- Negative Male for AMI  >=14 - Abnormal Female indicating possible myocardial injury.  >=22 - Abnormal Male indicating possible myocardial injury.   Clinicians would have to utilize clinical acumen, EKG, Troponin, and serial changes to determine if it is an Acute Myocardial Infarction or myocardial injury due to an underlying chronic condition.         Single High Sensitivity Troponin T [[id number]]  (Abnormal) Collected: 11/16/24 1238    Specimen: Blood Updated: 11/16/24 1314     HS Troponin T 56 ng/L     Narrative:      High Sensitive Troponin T Reference Range:  <14.0 ng/L- Negative Female for AMI  <22.0 ng/L- Negative Male for AMI  >=14 - Abnormal Female indicating possible myocardial injury.  >=22 - Abnormal Male indicating possible myocardial injury.   Clinicians would have to utilize clinical acumen, EKG, Troponin, and serial changes to determine if it is an Acute Myocardial Infarction or myocardial injury due to an underlying chronic condition.         BNP [[id number]]  (Abnormal) Collected: 11/16/24 1238    Specimen: Blood Updated: 11/16/24 1312     proBNP 2,608.0 pg/mL     Narrative:      This assay is used as an aid in the diagnosis of individuals suspected of having heart failure. It can be used as an aid in the diagnosis of acute decompensated heart failure (ADHF) in patients  presenting with signs and symptoms of ADHF to the emergency department (ED). In addition, NT-proBNP of <300 pg/mL indicates ADHF is not likely.    Age Range Result Interpretation  NT-proBNP Concentration (pg/mL:      <50             Positive            >450                   Gray                 300-450                    Negative             <300    50-75           Positive            >900                  Gray                300-900                  Negative            <300      >75             Positive            >1800                  Gray                300-1800                  Negative            <300    Comprehensive Metabolic Panel [[id number]]  (Abnormal) Collected: 11/16/24 1238    Specimen: Blood Updated: 11/16/24 1306     Glucose 108 mg/dL      BUN 29 mg/dL      Creatinine 0.94 mg/dL      Sodium 136 mmol/L      Potassium 4.4 mmol/L      Chloride 101 mmol/L      CO2 28.4 mmol/L      Calcium 8.9 mg/dL      Total Protein 7.0 g/dL      Albumin 3.3 g/dL      ALT (SGPT) 21 U/L      AST (SGOT) 18 U/L      Alkaline Phosphatase 116 U/L      Total Bilirubin 0.2 mg/dL      Globulin 3.7 gm/dL      A/G Ratio 0.9 g/dL      BUN/Creatinine Ratio 30.9     Anion Gap 6.6 mmol/L      eGFR 75.1 mL/min/1.73     Narrative:      GFR Normal >60  Chronic Kidney Disease <60  Kidney Failure <15    The GFR formula is only valid for adults with stable renal function between ages 18 and 70.    COVID-19 and FLU A/B PCR, 1 HR TAT - Swab, Nasopharynx [[id number]]  (Normal) Collected: 11/16/24 1204    Specimen: Swab from Nasopharynx Updated: 11/16/24 1241     COVID19 Not Detected     Influenza A PCR Not Detected     Influenza B PCR Not Detected    Narrative:      Fact sheet for providers: https://www.fda.gov/media/927053/download    Fact sheet for patients: https://www.fda.gov/media/017636/download    Test performed by PCR.             Results for orders placed during the hospital encounter of 11/16/24    Adult Transthoracic Echo Complete  W/ Cont if Necessary Per Protocol    Interpretation Summary    Left ventricular systolic function is normal. Calculated left ventricular EF = 64.3%    Left ventricular wall thickness is consistent with mild to moderate concentric hypertrophy.    Left ventricular diastolic function is consistent with (grade I) impaired relaxation.    The left atrial cavity is mildly dilated.    There is mild calcification of the aortic valve.    Estimated right ventricular systolic pressure from tricuspid regurgitation is markedly elevated (>55 mmHg).    Transthoracic echocardiography with EF of 64%.  Mild MR and no effusion    Electronically signed by Faustino García MD, 11/17/24, 2:35 PM EST.       Condition on Discharge:  Stable    Vital Signs  Temp:  [97.2 °F (36.2 °C)-98.9 °F (37.2 °C)] 98.3 °F (36.8 °C)  Heart Rate:  [78-99] 82  Resp:  [14-35] 19  BP: ()/(50-67) 111/50      Physical Exam  Vitals reviewed.   Constitutional:       Appearance: He is not ill-appearing.   HENT:      Head: Normocephalic and atraumatic.      Right Ear: External ear normal.      Left Ear: External ear normal.      Nose: Nose normal.      Mouth/Throat:      Mouth: Mucous membranes are moist.   Eyes:      General:         Right eye: No discharge.         Left eye: No discharge.   Cardiovascular:      Rate and Rhythm: Normal rate and regular rhythm.      Pulses: Normal pulses.      Heart sounds: Normal heart sounds.   Pulmonary:      Effort: Pulmonary effort is normal.      Breath sounds: Normal breath sounds.   Abdominal:      General: Bowel sounds are normal.      Palpations: Abdomen is soft.   Musculoskeletal:         General: Normal range of motion.      Cervical back: Normal range of motion.   Skin:     General: Skin is warm and dry.   Neurological:      Mental Status: He is alert and oriented to person, place, and time.   Psychiatric:         Behavior: Behavior normal.              Discharge Disposition  Skilled Nursing Facility ([place] -  External)    Discharge Medications     Discharge Medications        New Medications        Instructions Start Date   carbidopa-levodopa  MG per tablet  Commonly known as: SINEMET   1 tablet, Oral, 3 Times Daily      cefdinir 300 MG capsule  Commonly known as: OMNICEF   300 mg, Oral, 2 Times Daily      guaiFENesin 600 MG 12 hr tablet  Commonly known as: MUCINEX   1,200 mg, Oral, Every 12 Hours Scheduled      hydroCHLOROthiazide 25 MG tablet   25 mg, Oral, Daily      ipratropium-albuterol 0.5-2.5 mg/3 ml nebulizer  Commonly known as: DUO-NEB   3 mL, Nebulization, 3 Times Daily PRN      metoprolol succinate XL 25 MG 24 hr tablet  Commonly known as: TOPROL-XL   12.5 mg, Oral, Every 24 Hours Scheduled      pantoprazole 40 MG EC tablet  Commonly known as: PROTONIX   40 mg, Oral, Every Early Morning   Start Date: November 23, 2024     predniSONE 10 MG tablet  Commonly known as: DELTASONE   10 mg, Oral, Daily With Breakfast      sulfamethoxazole-trimethoprim 800-160 MG per tablet  Commonly known as: BACTRIM DS,SEPTRA DS   1 tablet, Oral, Every 12 Hours Scheduled             Continue These Medications        Instructions Start Date   acetaminophen 325 MG tablet  Commonly known as: TYLENOL   650 mg, Oral, Every 4 Hours PRN      amLODIPine 5 MG tablet  Commonly known as: NORVASC   5 mg, Oral, Daily      brimonidine-timolol 0.2-0.5 % ophthalmic solution  Commonly known as: COMBIGAN   1 drop, Every 12 Hours      cloNIDine 0.1 MG tablet  Commonly known as: CATAPRES   0.1 mg, Oral, 2 Times Daily      latanoprost 0.005 % ophthalmic solution  Commonly known as: XALATAN   1 drop, Nightly      primidone 250 MG tablet  Commonly known as: MYSOLINE   250 mg, Oral, 3 Times Daily      vitamin B-6 50 MG tablet  Commonly known as: PYRIDOXINE   25 mg, Oral, Daily             Stop These Medications      lisinopril-hydrochlorothiazide 20-12.5 MG per tablet  Commonly known as: PRINZIDE,ZESTORETIC     PENTOXIFYLLINE ER PO               Discharge Diet:   Diet Instructions       Diet: Regular/House Diet; Mechanical Ground (NDD 2); [place] Thick; No Straw      Discharge Diet: Regular/House Diet    Texture: Mechanical Ground (NDD 2)    Fluid Consistency: Nectar Thick    Diet Modifiers / Additional Diets: No Straw            Activity at Discharge:   Activity Instructions       Gradually Increase Activity Until at Pre-Hospitalization Level              Follow-up Appointments  No future appointments.  Additional Instructions for the Follow-ups that You Need to Schedule       Discharge Follow-up with PCP   As directed       Currently Documented PCP:    Provider, No Known    PCP Phone Number:    [phone]     Follow Up Details: follow up with Dr. De La Cruz in 2 weeks                Test Results Pending at Discharge  Pending Results       Procedure [Order ID] Specimen - Date/Time    Folate [[id number]]     Specimen: Blood     T4, Free [[id number]]     Specimen: Blood     TSH [[id number]]     Specimen: Blood     Vitamin B12 [[id number]]     Specimen: Blood              BEBO Doran  11/22/24  07:25 EST    Time: Discharge 25 min          Electronically signed by Candelaria Spears APRN at 11/22/24 0778

## 2024-11-22 NOTE — DISCHARGE SUMMARY
Date of Discharge:  11/22/2024    Discharge Diagnosis:   Dyspnea dyspnea  Pulmonary edema  Fluid overload  Aspiration pna  Parainfluenza virus  MRSA positive  Elevated troponin  Elevated D-dimer  Glaucoma  Urinary tract infection  Hypertension  Essential tremor    Presenting Problem/History of Present Illness  Active Hospital Problems    Diagnosis  POA    **CHF (congestive heart failure) [I50.9]  Yes    Acute heart failure with preserved ejection fraction (HFpEF) [I50.31]  Yes      Resolved Hospital Problems   No resolved problems to display.          Hospital Course    Patient is a 89 y.o. male presented with past medical history of hypertension, tremors, and glaucoma. He presented from free standing ER due to edema and shortness of breath. Per note from ER he does not lay flat and stay in a recliner. Spoke with son in law and he states patient lives alone and this is his first hospitalization. He states that family check in with him three times daily and more.  Patient was treated for pulmonary edema, fluid overload, aspiration pneumonia, parainfluenza virus and urinary tract infection.  Patient was seen by pulmonary, neurology and cardiology.  He was treated with gentle diuretics, antibiotics and supportive care. UTI grew klebsiella oxytoca. He was seen by speech therapy and diet altered for dysphagia.  He is feeling improved and ready for rehab.  He has been hemodynamically stable.  He will need follow-up PCP in 2 weeks.    Procedures Performed         Consults:   Consults       Date and Time Order Name Status Description    11/20/2024  3:02 PM Inpatient Neurology Consult General Completed     11/17/2024 10:19 AM Inpatient Pulmonology Consult Completed     11/16/2024  7:24 PM Inpatient Cardiology Consult              Pertinent Test Results:    Lab Results (most recent)       Procedure Component Value Units Date/Time    Basic Metabolic Panel [[id number]]  (Abnormal) Collected: 11/21/24 0136    Specimen: Blood  Updated: 11/21/24 0229     Glucose 111 mg/dL      BUN 31 mg/dL      Creatinine 0.78 mg/dL      Sodium 137 mmol/L      Potassium 4.6 mmol/L      Comment: Specimen hemolyzed.  Result may be falsely elevated.        Chloride 101 mmol/L      CO2 26.2 mmol/L      Calcium 9.0 mg/dL      BUN/Creatinine Ratio 39.7     Anion Gap 9.8 mmol/L      eGFR 82.6 mL/min/1.73     Narrative:      GFR Normal >60  Chronic Kidney Disease <60  Kidney Failure <15    The GFR formula is only valid for adults with stable renal function between ages 18 and 70.    BNP [[id number]]  (Abnormal) Collected: 11/21/24 0136    Specimen: Blood Updated: 11/21/24 0227     proBNP 2,992.0 pg/mL     Narrative:      This assay is used as an aid in the diagnosis of individuals suspected of having heart failure. It can be used as an aid in the diagnosis of acute decompensated heart failure (ADHF) in patients presenting with signs and symptoms of ADHF to the emergency department (ED). In addition, NT-proBNP of <300 pg/mL indicates ADHF is not likely.    Age Range Result Interpretation  NT-proBNP Concentration (pg/mL:      <50             Positive            >450                   Gray                 300-450                    Negative             <300    50-75           Positive            >900                  Gray                300-900                  Negative            <300      >75             Positive            >1800                  Gray                300-1800                  Negative            <300    CBC & Differential [[id number]]  (Abnormal) Collected: 11/21/24 0136    Specimen: Blood Updated: 11/21/24 0202    Narrative:      The following orders were created for panel order CBC & Differential.  Procedure                               Abnormality         Status                     ---------                               -----------         ------                     CBC Auto Differential[[id number]]        Abnormal            Final result                  Please view results for these tests on the individual orders.    CBC Auto Differential [[id number]]  (Abnormal) Collected: 11/21/24 0136    Specimen: Blood Updated: 11/21/24 0202     WBC 10.24 10*3/mm3      RBC 3.79 10*6/mm3      Hemoglobin 12.0 g/dL      Hematocrit 38.2 %      .8 fL      MCH 31.7 pg      MCHC 31.4 g/dL      RDW 14.2 %      RDW-SD 52.9 fl      MPV 10.5 fL      Platelets 187 10*3/mm3      Neutrophil % 68.9 %      Lymphocyte % 15.6 %      Monocyte % 14.2 %      Eosinophil % 0.4 %      Basophil % 0.3 %      Immature Grans % 0.6 %      Neutrophils, Absolute 7.06 10*3/mm3      Lymphocytes, Absolute 1.60 10*3/mm3      Monocytes, Absolute 1.45 10*3/mm3      Eosinophils, Absolute 0.04 10*3/mm3      Basophils, Absolute 0.03 10*3/mm3      Immature Grans, Absolute 0.06 10*3/mm3      nRBC 0.0 /100 WBC     Basic Metabolic Panel [[id number]]  (Abnormal) Collected: 11/20/24 0305    Specimen: Blood Updated: 11/20/24 0402     Glucose 91 mg/dL      BUN 28 mg/dL      Creatinine 0.85 mg/dL      Sodium 137 mmol/L      Potassium 4.6 mmol/L      Comment: Specimen hemolyzed.  Result may be falsely elevated.        Chloride 101 mmol/L      CO2 28.7 mmol/L      Calcium 8.9 mg/dL      BUN/Creatinine Ratio 32.9     Anion Gap 7.3 mmol/L      eGFR 80.5 mL/min/1.73     Narrative:      GFR Normal >60  Chronic Kidney Disease <60  Kidney Failure <15    The GFR formula is only valid for adults with stable renal function between ages 18 and 70.    CBC & Differential [[id number]]  (Abnormal) Collected: 11/20/24 0305    Specimen: Blood Updated: 11/20/24 0324    Narrative:      The following orders were created for panel order CBC & Differential.  Procedure                               Abnormality         Status                     ---------                               -----------         ------                     CBC Auto Differential[[id number]]        Abnormal            Final result                 Please view results  for these tests on the individual orders.    CBC Auto Differential [[id number]]  (Abnormal) Collected: 11/20/24 0305    Specimen: Blood Updated: 11/20/24 0324     WBC 10.06 10*3/mm3      RBC 3.54 10*6/mm3      Hemoglobin 11.3 g/dL      Hematocrit 35.5 %      .3 fL      MCH 31.9 pg      MCHC 31.8 g/dL      RDW 14.2 %      RDW-SD 53.3 fl      MPV 10.5 fL      Platelets 160 10*3/mm3      Neutrophil % 65.9 %      Lymphocyte % 18.7 %      Monocyte % 14.5 %      Eosinophil % 0.3 %      Basophil % 0.1 %      Immature Grans % 0.5 %      Neutrophils, Absolute 6.63 10*3/mm3      Lymphocytes, Absolute 1.88 10*3/mm3      Monocytes, Absolute 1.46 10*3/mm3      Eosinophils, Absolute 0.03 10*3/mm3      Basophils, Absolute 0.01 10*3/mm3      Immature Grans, Absolute 0.05 10*3/mm3      nRBC 0.0 /100 WBC     Urine Culture - Urine, Urine, Clean Catch [[id number]]  (Abnormal)  (Susceptibility) Collected: 11/17/24 1213    Specimen: Urine, Clean Catch Updated: 11/19/24 1037     Urine Culture >100,000 CFU/mL Klebsiella oxytoca    Narrative:      Colonization of the urinary tract without infection is common. Treatment is discouraged unless the patient is symptomatic, pregnant, or undergoing an invasive urologic procedure.    Susceptibility        Klebsiella oxytoca      GAVI      Amoxicillin + Clavulanate Susceptible      Ampicillin Resistant      Ampicillin + Sulbactam Susceptible      Cefazolin (Urine) Susceptible      Cefepime Susceptible      Ceftazidime Susceptible      Ceftriaxone Susceptible      Gentamicin Susceptible      Levofloxacin Susceptible      Nitrofurantoin Susceptible      Piperacillin + Tazobactam Susceptible      Trimethoprim + Sulfamethoxazole Susceptible                           Magnesium [[id number]]  (Normal) Collected: 11/19/24 0446    Specimen: Blood Updated: 11/19/24 0537     Magnesium 2.0 mg/dL     Urinalysis With Culture If Indicated - Urine, Clean Catch [[id number]]  (Abnormal) Collected: 11/17/24 1213     Specimen: Urine, Clean Catch Updated: 11/17/24 1226     Color, UA Yellow     Appearance, UA Cloudy     pH, UA 7.5     Specific Gravity, UA 1.018     Glucose, UA Negative     Ketones, UA Trace     Bilirubin, UA Negative     Blood, UA Moderate (2+)     Protein, UA 30 mg/dL (1+)     Leuk Esterase, UA Moderate (2+)     Nitrite, UA Positive     Urobilinogen, UA 1.0 E.U./dL    Narrative:      In absence of clinical symptoms, the presence of pyuria, bacteria, and/or nitrites on the urinalysis result does not correlate with infection.    Urinalysis, Microscopic Only - Urine, Clean Catch [[id number]]  (Abnormal) Collected: 11/17/24 1213    Specimen: Urine, Clean Catch Updated: 11/17/24 1226     RBC, UA 6-10 /HPF      WBC, UA Too Numerous to Count /HPF      Bacteria, UA 4+ /HPF      Squamous Epithelial Cells, UA 0-2 /HPF      Hyaline Casts, UA 3-6 /LPF      Methodology Automated Microscopy    Extra Tubes [[id number]] Collected: 11/17/24 0622    Specimen: Blood from Arm, Right Updated: 11/17/24 0630    Narrative:      The following orders were created for panel order Extra Tubes.  Procedure                               Abnormality         Status                     ---------                               -----------         ------                     Light Blue Top[[id number]]                                   Final result                 Please view results for these tests on the individual orders.    Light Blue Top [[id number]] Collected: 11/17/24 0622    Specimen: Blood from Arm, Right Updated: 11/17/24 0630     Extra Tube Hold for add-ons.     Comment: Auto resulted       D-dimer, Quantitative [[id number]]  (Abnormal) Collected: 11/16/24 2204    Specimen: Blood from Arm, Right Updated: 11/16/24 2232     D-Dimer, Quantitative 2.86 MCGFEU/mL     Narrative:      According to the assay 's published package insert, a normal (<0.50 MCGFEU/mL) D-dimer result in conjunction with a non-high clinical probability assessment,  "excludes deep vein thrombosis (DVT) and pulmonary embolism (PE) with high sensitivity.    D-dimer values increase with age and this can make VTE exclusion of an older population difficult. To address this, the [employer], based on best available evidence and recent guidelines, recommends that clinicians use age-adjusted D-dimer thresholds in patients greater than 50 years of age with: a) a low probability of PE who do not meet all Pulmonary Embolism Rule Out Criteria, or b) in those with intermediate probability of PE.   The formula for an age-adjusted D-dimer cut-off is \"age/100\".  For example, a 60 year old patient would have an age-adjusted cut-off of 0.60 MCGFEU/mL and an 80 year old 0.80 MCGFEU/mL.    Magnesium [[id number]]  (Normal) Collected: 11/16/24 1815    Specimen: Blood Updated: 11/16/24 1902     Magnesium 1.9 mg/dL     Iron Profile [[id number]]  (Abnormal) Collected: 11/16/24 1815    Specimen: Blood Updated: 11/16/24 1857     Iron 23 mcg/dL      Iron Saturation (TSAT) 10 %      Transferrin 161 mg/dL      TIBC 240 mcg/dL     TSH Rfx On Abnormal To Free T4 [[id number]]  (Normal) Collected: 11/16/24 1815    Specimen: Blood Updated: 11/16/24 1857     TSH 0.992 uIU/mL     MRSA Screen, PCR (Inpatient) - Swab, Nares [[id number]]  (Abnormal) Collected: 11/16/24 1715    Specimen: Swab from Nares Updated: 11/16/24 1835     MRSA PCR MRSA Detected    Narrative:      The negative predictive value of this diagnostic test is high and should only be used to consider de-escalating anti-MRSA therapy. A positive result may indicate colonization with MRSA and must be correlated clinically.    Respiratory Panel PCR w/COVID-19(SARS-CoV-2) LEVI/NIKO/JOSE ARMANDO/PAD/COR/RYNE In-House, NP Swab in UTM/VTM, 2 HR TAT - Swab, Nasopharynx [[id number]]  (Abnormal) Collected: 11/16/24 1715    Specimen: Swab from Nasopharynx Updated: 11/16/24 1815     ADENOVIRUS, PCR Not Detected     Coronavirus 229E Not Detected     Coronavirus " HKU1 Not Detected     Coronavirus NL63 Not Detected     Coronavirus OC43 Not Detected     COVID19 Not Detected     Human Metapneumovirus Not Detected     Human Rhinovirus/Enterovirus Not Detected     Influenza A PCR Not Detected     Influenza B PCR Not Detected     Parainfluenza Virus 1 Not Detected     Parainfluenza Virus 2 Not Detected     Parainfluenza Virus 3 Not Detected     Parainfluenza Virus 4 Detected     RSV, PCR Not Detected     Bordetella pertussis pcr Not Detected     Bordetella parapertussis PCR Not Detected     Chlamydophila pneumoniae PCR Not Detected     Mycoplasma pneumo by PCR Not Detected    Narrative:      In the setting of a positive respiratory panel with a viral infection PLUS a negative procalcitonin without other underlying concern for bacterial infection, consider observing off antibiotics or discontinuation of antibiotics and continue supportive care. If the respiratory panel is positive for atypical bacterial infection (Bordetella pertussis, Chlamydophila pneumoniae, or Mycoplasma pneumoniae), consider antibiotic de-escalation to target atypical bacterial infection.    Urinalysis With Microscopic If Indicated (No Culture) - Urine, Clean Catch [[id number]]  (Abnormal) Collected: 11/16/24 1715    Specimen: Urine, Clean Catch Updated: 11/16/24 1752     Color, UA Yellow     Appearance, UA Clear     pH, UA 5.5     Specific Gravity, UA 1.017     Glucose, UA Negative     Ketones, UA Negative     Bilirubin, UA Negative     Blood, UA Small (1+)     Protein, UA Trace     Leuk Esterase, UA Large (3+)     Nitrite, UA Positive     Urobilinogen, UA 1.0 E.U./dL    Urinalysis, Microscopic Only - Urine, Clean Catch [[id number]]  (Abnormal) Collected: 11/16/24 1715    Specimen: Urine, Clean Catch Updated: 11/16/24 1752     RBC, UA 0-2 /HPF      WBC, UA Too Numerous to Count /HPF      Bacteria, UA 4+ /HPF      Squamous Epithelial Cells, UA None Seen /HPF      Hyaline Casts, UA None Seen /LPF      Methodology  Automated Microscopy    High Sensitivity Troponin T 2Hr [[id number]]  (Abnormal) Collected: 11/16/24 1409    Specimen: Blood Updated: 11/16/24 1429     HS Troponin T 54 ng/L      Troponin T Delta -2 ng/L     Narrative:      High Sensitive Troponin T Reference Range:  <14.0 ng/L- Negative Female for AMI  <22.0 ng/L- Negative Male for AMI  >=14 - Abnormal Female indicating possible myocardial injury.  >=22 - Abnormal Male indicating possible myocardial injury.   Clinicians would have to utilize clinical acumen, EKG, Troponin, and serial changes to determine if it is an Acute Myocardial Infarction or myocardial injury due to an underlying chronic condition.         Single High Sensitivity Troponin T [[id number]]  (Abnormal) Collected: 11/16/24 1238    Specimen: Blood Updated: 11/16/24 1314     HS Troponin T 56 ng/L     Narrative:      High Sensitive Troponin T Reference Range:  <14.0 ng/L- Negative Female for AMI  <22.0 ng/L- Negative Male for AMI  >=14 - Abnormal Female indicating possible myocardial injury.  >=22 - Abnormal Male indicating possible myocardial injury.   Clinicians would have to utilize clinical acumen, EKG, Troponin, and serial changes to determine if it is an Acute Myocardial Infarction or myocardial injury due to an underlying chronic condition.         BNP [[id number]]  (Abnormal) Collected: 11/16/24 1238    Specimen: Blood Updated: 11/16/24 1312     proBNP 2,608.0 pg/mL     Narrative:      This assay is used as an aid in the diagnosis of individuals suspected of having heart failure. It can be used as an aid in the diagnosis of acute decompensated heart failure (ADHF) in patients presenting with signs and symptoms of ADHF to the emergency department (ED). In addition, NT-proBNP of <300 pg/mL indicates ADHF is not likely.    Age Range Result Interpretation  NT-proBNP Concentration (pg/mL:      <50             Positive            >450                   Gray                 300-450                     Negative             <300    50-75           Positive            >900                  Gray                300-900                  Negative            <300      >75             Positive            >1800                  Gray                300-1800                  Negative            <300    Comprehensive Metabolic Panel [[id number]]  (Abnormal) Collected: 11/16/24 1238    Specimen: Blood Updated: 11/16/24 1306     Glucose 108 mg/dL      BUN 29 mg/dL      Creatinine 0.94 mg/dL      Sodium 136 mmol/L      Potassium 4.4 mmol/L      Chloride 101 mmol/L      CO2 28.4 mmol/L      Calcium 8.9 mg/dL      Total Protein 7.0 g/dL      Albumin 3.3 g/dL      ALT (SGPT) 21 U/L      AST (SGOT) 18 U/L      Alkaline Phosphatase 116 U/L      Total Bilirubin 0.2 mg/dL      Globulin 3.7 gm/dL      A/G Ratio 0.9 g/dL      BUN/Creatinine Ratio 30.9     Anion Gap 6.6 mmol/L      eGFR 75.1 mL/min/1.73     Narrative:      GFR Normal >60  Chronic Kidney Disease <60  Kidney Failure <15    The GFR formula is only valid for adults with stable renal function between ages 18 and 70.    COVID-19 and FLU A/B PCR, 1 HR TAT - Swab, Nasopharynx [[id number]]  (Normal) Collected: 11/16/24 1204    Specimen: Swab from Nasopharynx Updated: 11/16/24 1241     COVID19 Not Detected     Influenza A PCR Not Detected     Influenza B PCR Not Detected    Narrative:      Fact sheet for providers: https://www.fda.gov/media/099525/download    Fact sheet for patients: https://www.fda.gov/media/368876/download    Test performed by PCR.             Results for orders placed during the hospital encounter of 11/16/24    Adult Transthoracic Echo Complete W/ Cont if Necessary Per Protocol    Interpretation Summary    Left ventricular systolic function is normal. Calculated left ventricular EF = 64.3%    Left ventricular wall thickness is consistent with mild to moderate concentric hypertrophy.    Left ventricular diastolic function is consistent with (grade I) impaired  relaxation.    The left atrial cavity is mildly dilated.    There is mild calcification of the aortic valve.    Estimated right ventricular systolic pressure from tricuspid regurgitation is markedly elevated (>55 mmHg).    Transthoracic echocardiography with EF of 64%.  Mild MR and no effusion    Electronically signed by Faustino García MD, 11/17/24, 2:35 PM EST.       Condition on Discharge:  Stable    Vital Signs  Temp:  [97.2 °F (36.2 °C)-98.9 °F (37.2 °C)] 98.3 °F (36.8 °C)  Heart Rate:  [78-99] 82  Resp:  [14-35] 19  BP: ()/(50-67) 111/50      Physical Exam  Vitals reviewed.   Constitutional:       Appearance: He is not ill-appearing.   HENT:      Head: Normocephalic and atraumatic.      Right Ear: External ear normal.      Left Ear: External ear normal.      Nose: Nose normal.      Mouth/Throat:      Mouth: Mucous membranes are moist.   Eyes:      General:         Right eye: No discharge.         Left eye: No discharge.   Cardiovascular:      Rate and Rhythm: Normal rate and regular rhythm.      Pulses: Normal pulses.      Heart sounds: Normal heart sounds.   Pulmonary:      Effort: Pulmonary effort is normal.      Breath sounds: Normal breath sounds.   Abdominal:      General: Bowel sounds are normal.      Palpations: Abdomen is soft.   Musculoskeletal:         General: Normal range of motion.      Cervical back: Normal range of motion.   Skin:     General: Skin is warm and dry.   Neurological:      Mental Status: He is alert and oriented to person, place, and time.   Psychiatric:         Behavior: Behavior normal.              Discharge Disposition  Skilled Nursing Facility (DC - External)    Discharge Medications     Discharge Medications        New Medications        Instructions Start Date   carbidopa-levodopa  MG per tablet  Commonly known as: SINEMET   1 tablet, Oral, 3 Times Daily      cefdinir 300 MG capsule  Commonly known as: OMNICEF   300 mg, Oral, 2 Times Daily      guaiFENesin 600  MG 12 hr tablet  Commonly known as: MUCINEX   1,200 mg, Oral, Every 12 Hours Scheduled      hydroCHLOROthiazide 25 MG tablet   25 mg, Oral, Daily      ipratropium-albuterol 0.5-2.5 mg/3 ml nebulizer  Commonly known as: DUO-NEB   3 mL, Nebulization, 3 Times Daily PRN      metoprolol succinate XL 25 MG 24 hr tablet  Commonly known as: TOPROL-XL   12.5 mg, Oral, Every 24 Hours Scheduled      pantoprazole 40 MG EC tablet  Commonly known as: PROTONIX   40 mg, Oral, Every Early Morning   Start Date: November 23, 2024     predniSONE 10 MG tablet  Commonly known as: DELTASONE   10 mg, Oral, Daily With Breakfast      sulfamethoxazole-trimethoprim 800-160 MG per tablet  Commonly known as: BACTRIM DS,SEPTRA DS   1 tablet, Oral, Every 12 Hours Scheduled             Continue These Medications        Instructions Start Date   acetaminophen 325 MG tablet  Commonly known as: TYLENOL   650 mg, Oral, Every 4 Hours PRN      amLODIPine 5 MG tablet  Commonly known as: NORVASC   5 mg, Oral, Daily      brimonidine-timolol 0.2-0.5 % ophthalmic solution  Commonly known as: COMBIGAN   1 drop, Every 12 Hours      cloNIDine 0.1 MG tablet  Commonly known as: CATAPRES   0.1 mg, Oral, 2 Times Daily      latanoprost 0.005 % ophthalmic solution  Commonly known as: XALATAN   1 drop, Nightly      primidone 250 MG tablet  Commonly known as: MYSOLINE   250 mg, Oral, 3 Times Daily      vitamin B-6 50 MG tablet  Commonly known as: PYRIDOXINE   25 mg, Oral, Daily             Stop These Medications      lisinopril-hydrochlorothiazide 20-12.5 MG per tablet  Commonly known as: PRINZIDE,ZESTORETIC     PENTOXIFYLLINE ER PO              Discharge Diet:   Diet Instructions       Diet: Regular/House Diet; [place] (NDD 2); [place] Thick; No Straw      Discharge Diet: Regular/House Diet    Texture: Mechanical [place] (NDD 2)    Fluid Consistency: Nectar Thick    Diet Modifiers / Additional Diets: No Straw            Activity at Discharge:   Activity  Instructions       Gradually Increase Activity Until at Pre-Hospitalization Level              Follow-up Appointments  No future appointments.  Additional Instructions for the Follow-ups that You Need to Schedule       Discharge Follow-up with PCP   As directed       Currently Documented PCP:    Provider, No Known    PCP Phone Number:    [phone]     Follow Up Details: follow up with Dr. De La Cruz in 2 weeks                Test Results Pending at Discharge  Pending Results       Procedure [Order ID] Specimen - Date/Time    Folate [[id number]]     Specimen: Blood     T4, Free [[id number]]     Specimen: Blood     TSH [[id number]]     Specimen: Blood     Vitamin B12 [[id number]]     Specimen: Blood              BEBO Doran  11/22/24  07:25 EST    Time: Discharge 25 min

## 2024-11-22 NOTE — PLAN OF CARE
Goal Outcome Evaluation:                                      Patient remains on 2LNC. No gtts. AOx2/3, follows commands. Tolerated sitting in chair for 2.5 hours. Required two people to ambulate. Tolerated turns and mech/soft - nectar thick liquid diet. Congested cough - nonproductive.

## 2024-12-02 NOTE — PROGRESS NOTES
"Enter Query Response Below      Query Response: Parainfluenza virus pneumonia.             If applicable, please update the problem list.     Patient: Sathya Flores        : 1930  Account: [id number]           Admit Date: 2024        How to Respond to this query:       a. Click New Note     b. Answer query within the yellow box.                c. Update the Problem List, if applicable.      If you have any questions about this query contact me at: [phone]     Dr. Mccarty:    89 y.o. male with history of hypertension, tremors, and glaucoma, presented  with edema and shortness of breath. Respiratory panel showed parainfluenza virus 4 detected.  Labs include WBC 10.80 (), 12.50 (), 15.43 (), Chest x-ray  showed \"Mild scattered patchy areas of airspace disease concerning for pneumonia.\"  Progress note  includes \"Pneumonia - likely developing secondary bacterial pneumonia.\"  Patient evaluated by speech therapy and found moderate; oral dysphagia; mild/mod pharyngeal dysphagia and recommended mech soft w/nectar thick liquids.  Patient was given rocephin IV -, unasyn IV -.  Discharge summary includes \"Aspiration pna\" and \"Parainfluenza virus.\"     Please clarify the type of pneumonia, in addition to aspiration pneumonia, the patient was treated/monitored for:    Parainfluenza virus pneumonia  Bacterial pneumonia unspecified superimposed on parainfluenza virus pneumonia  Upper respiratory tract infection only due to parainfluenza virus  Other- specify______    By submitting this query, we are merely seeking further clarification of documentation to accurately reflect all conditions that you are monitoring, evaluating, treating or that extend the hospitalization or utilize additional resources of care. Please utilize your independent clinical judgment when addressing the question(s) above.     This query and your response, once completed, will be entered " into the legal medical record.    Sincerely,  Lauren Khan MSN, APRN-BC, [facility]   Clinical Documentation Integrity Program   [email or web address]